# Patient Record
Sex: FEMALE | Race: WHITE | NOT HISPANIC OR LATINO | Employment: FULL TIME | URBAN - METROPOLITAN AREA
[De-identification: names, ages, dates, MRNs, and addresses within clinical notes are randomized per-mention and may not be internally consistent; named-entity substitution may affect disease eponyms.]

---

## 2017-12-14 ENCOUNTER — GENERIC CONVERSION - ENCOUNTER (OUTPATIENT)
Dept: OTHER | Facility: OTHER | Age: 37
End: 2017-12-14

## 2018-01-24 VITALS
SYSTOLIC BLOOD PRESSURE: 108 MMHG | RESPIRATION RATE: 18 BRPM | HEIGHT: 66 IN | HEART RATE: 88 BPM | DIASTOLIC BLOOD PRESSURE: 70 MMHG | OXYGEN SATURATION: 97 % | TEMPERATURE: 97.5 F

## 2018-01-24 VITALS — BODY MASS INDEX: 26.87 KG/M2 | WEIGHT: 166.5 LBS

## 2018-04-09 ENCOUNTER — OFFICE VISIT (OUTPATIENT)
Dept: URGENT CARE | Facility: CLINIC | Age: 38
End: 2018-04-09
Payer: COMMERCIAL

## 2018-04-09 VITALS
HEIGHT: 67 IN | TEMPERATURE: 97.8 F | WEIGHT: 162.2 LBS | OXYGEN SATURATION: 98 % | SYSTOLIC BLOOD PRESSURE: 124 MMHG | HEART RATE: 84 BPM | RESPIRATION RATE: 18 BRPM | DIASTOLIC BLOOD PRESSURE: 68 MMHG | BODY MASS INDEX: 25.46 KG/M2

## 2018-04-09 DIAGNOSIS — J04.0 ACUTE LARYNGITIS: Primary | ICD-10-CM

## 2018-04-09 LAB — S PYO AG THROAT QL: NEGATIVE

## 2018-04-09 PROCEDURE — 87070 CULTURE OTHR SPECIMN AEROBIC: CPT | Performed by: FAMILY MEDICINE

## 2018-04-09 PROCEDURE — 99213 OFFICE O/P EST LOW 20 MIN: CPT | Performed by: FAMILY MEDICINE

## 2018-04-09 PROCEDURE — 87880 STREP A ASSAY W/OPTIC: CPT | Performed by: FAMILY MEDICINE

## 2018-04-09 RX ORDER — DIPHENOXYLATE HYDROCHLORIDE AND ATROPINE SULFATE 2.5; .025 MG/1; MG/1
TABLET ORAL
COMMUNITY
End: 2022-03-10 | Stop reason: HOSPADM

## 2018-04-09 RX ORDER — PREDNISONE 50 MG/1
50 TABLET ORAL DAILY
Qty: 5 TABLET | Refills: 0 | Status: SHIPPED | OUTPATIENT
Start: 2018-04-09 | End: 2018-04-14

## 2018-04-09 RX ORDER — NORETHINDRONE ACETATE/ETHINYL ESTRADIOL AND FERROUS FUMARATE 1.5-30(21)
KIT ORAL
Refills: 0 | COMMUNITY
Start: 2018-04-02

## 2018-04-09 NOTE — PROGRESS NOTES
[facility]        NAME: Manju Centeno is a 30 y o  female  : 1980    MRN: [id number]  DATE: 2018  TIME: 10:03 AM    Assessment and Plan   Acute laryngitis [J04 0]  1  Acute laryngitis           Patient Instructions       Follow up with PCP in 3-5 days  Proceed to  ER if symptoms worsen  Chief Complaint     Chief Complaint   Patient presents with    Cold Like Symptoms     Pt here ill x5 day pt states loss of voice,  sore throat,  head congestion,  feverish,  cough  Pt used Dayquil and Advil cold and Sinus  History of Present Illness       URI    The current episode started in the past 7 days  The problem has been gradually worsening  There has been no fever  Associated symptoms include coughing, rhinorrhea and a sore throat  Pertinent negatives include no plugged ear sensation or sinus pain  She has tried decongestant for the symptoms  The treatment provided mild relief  Review of Systems   Review of Systems   Constitutional: Positive for chills  Negative for fever  HENT: Positive for rhinorrhea and sore throat  Negative for sinus pain  Eyes: Negative  Respiratory: Positive for cough  Cardiovascular: Negative  Gastrointestinal: Negative  Musculoskeletal: Negative  Current Medications       Current Outpatient Prescriptions:     VAHE FE 1  1 5-30 MG-MCG tablet, , Disp: , Rfl: 0    multivitamin (THERAGRAN) TABS, Take by mouth, Disp: , Rfl:     Current Allergies     Allergies as of 2018    (No Known Allergies)            The following portions of the patient's history were reviewed and updated as appropriate: allergies, current medications, past family history, past medical history, past social history, past surgical history and problem list      History reviewed  No pertinent past medical history      Past Surgical History:   Procedure Laterality Date     SECTION         Family History   Problem Relation Age of Onset    Breast cancer Mother     GI problems Father          Medications have been verified  Objective   /68 (BP Location: Right arm, Patient Position: Sitting, Cuff Size: Standard)   Pulse 84   Temp 97 8 °F (36 6 °C) (Tympanic)   Resp 18   Ht 5' 7" (1 702 m)   Wt 73 6 kg (162 lb 3 2 oz)   SpO2 98%   BMI 25 40 kg/m²        Physical Exam     Physical Exam   Constitutional: She is oriented to person, place, and time  She appears well-developed and well-nourished  HENT:   Right Ear: External ear normal    Left Ear: External ear normal    Nose: Nose normal    Mouth/Throat: Oropharynx is clear and moist  No oropharyngeal exudate  Eyes: Conjunctivae are normal    Neck: Normal range of motion  Neck supple  Cardiovascular: Normal rate, regular rhythm and normal heart sounds  No murmur heard  Pulmonary/Chest: Effort normal and breath sounds normal  No respiratory distress  She has no wheezes  She has no rales  She exhibits no tenderness  Abdominal: Soft  She exhibits no distension and no mass  There is no tenderness  There is no rebound and no guarding  Musculoskeletal: Normal range of motion  Lymphadenopathy:     She has no cervical adenopathy  Neurological: She is alert and oriented to person, place, and time  No cranial nerve deficit  Skin: Skin is warm  No rash noted  No erythema

## 2018-04-09 NOTE — PATIENT INSTRUCTIONS
Follow up with PCP in 3-5 days  Proceed to  ER if symptoms worsen  Upper Respiratory Infection   AMBULATORY CARE:   An upper respiratory infection  is also called a common cold  It can affect your nose, throat, ears, and sinuses  Common signs and symptoms include the following:  Cold symptoms are usually worst for the first 3 to 5 days  You may have any of the following:  · Runny or stuffy nose    · Sneezing and coughing    · Sore throat or hoarseness    · Red, watery, and sore eyes    · Fatigue     · Chills and fever    · Headache, body aches, or sore muscles  Seek care immediately if:   · You have chest pain or trouble breathing  Contact your healthcare provider if:   · You have a fever over 102ºF (39°C)  · Your sore throat gets worse or you see white or yellow spots in your throat  · Your symptoms get worse after 3 to 5 days or your cold is not better in 14 days  · You have a rash anywhere on your skin  · You have large, tender lumps in your neck  · You have thick, green or yellow drainage from your nose  · You cough up thick yellow, green, or bloody mucus  · You have vomiting for more than 24 hours and cannot keep fluids down  · You have a bad earache  · You have questions or concerns about your condition or care  Treatment for a cold: There is no cure for the common cold  Colds are caused by viruses and do not get better with antibiotics  Most people get better in 7 to 14 days  You may continue to cough for 2 to 3 weeks  The following may help decrease your symptoms:  · Decongestants  help reduce nasal congestion and help you breathe more easily  If you take decongestant pills, they may make you feel restless or not able to sleep  Do not use decongestant sprays for more than a few days  · Cough suppressants  help reduce coughing  Ask your healthcare provider which type of cough medicine is best for you       · NSAIDs , such as ibuprofen, help decrease swelling, pain, and fever  NSAIDs can cause stomach bleeding or kidney problems in certain people  If you take blood thinner medicine, always ask your healthcare provider if NSAIDs are safe for you  Always read the medicine label and follow directions  · Acetaminophen  decreases pain and fever  It is available without a doctor's order  Ask how much to take and how often to take it  Follow directions  Read the labels of all other medicines you are using to see if they also contain acetaminophen, or ask your doctor or pharmacist  Acetaminophen can cause liver damage if not taken correctly  Do not use more than 4 grams (4,000 milligrams) total of acetaminophen in one day  Manage your cold:   · Rest as much as possible  Slowly start to do more each day  · Drink more liquids as directed  Liquids will help thin and loosen mucus so you can cough it up  Liquids will also help prevent dehydration  Liquids that help prevent dehydration include water, fruit juice, and broth  Do not drink liquids that contain caffeine  Caffeine can increase your risk for dehydration  Ask your healthcare provider how much liquid to drink each day  · Soothe a sore throat  Gargle with warm salt water  This helps your sore throat feel better  Make salt water by dissolving ¼ teaspoon salt in 1 cup warm water  You may also suck on hard candy or throat lozenges  You may use a sore throat spray  · Use a humidifier or vaporizer  Use a cool mist humidifier or a vaporizer to increase air moisture in your home  This may make it easier for you to breathe and help decrease your cough  · Use saline nasal drops as directed  These help relieve congestion  · Apply petroleum-based jelly around the outside of your nostrils  This can decrease irritation from blowing your nose  · Do not smoke  Nicotine and other chemicals in cigarettes and cigars can make your symptoms worse  They can also cause infections such as bronchitis or pneumonia   Ask your healthcare provider for information if you currently smoke and need help to quit  E-cigarettes or smokeless tobacco still contain nicotine  Talk to your healthcare provider before you use these products  Prevent spreading your cold to others:   · Try to stay away from other people during the first 2 to 3 days of your cold when it is more easily spread  · Do not share food or drinks  · Do not share hand towels with household members  · Wash your hands often, especially after you blow your nose  Turn away from other people and cover your mouth and nose with a tissue when you sneeze or cough  Follow up with your healthcare provider as directed:  Write down your questions so you remember to ask them during your visits  © 2017 [facility] Information is for End User's use only and may not be sold, redistributed or otherwise used for commercial purposes  All illustrations and images included in CareNotes® are the copyrighted property of A D A M , Inc  or Carlos Olvera  The above information is an  only  It is not intended as medical advice for individual conditions or treatments  Talk to your doctor, nurse or pharmacist before following any medical regimen to see if it is safe and effective for you

## 2018-04-11 LAB — BACTERIA THROAT CULT: NORMAL

## 2018-10-01 ENCOUNTER — OFFICE VISIT (OUTPATIENT)
Dept: URGENT CARE | Facility: CLINIC | Age: 38
End: 2018-10-01
Payer: COMMERCIAL

## 2018-10-01 VITALS
RESPIRATION RATE: 16 BRPM | WEIGHT: 156 LBS | TEMPERATURE: 97.6 F | DIASTOLIC BLOOD PRESSURE: 64 MMHG | SYSTOLIC BLOOD PRESSURE: 102 MMHG | HEIGHT: 66 IN | HEART RATE: 73 BPM | OXYGEN SATURATION: 100 % | BODY MASS INDEX: 25.07 KG/M2

## 2018-10-01 DIAGNOSIS — B34.9 VIRAL SYNDROME: ICD-10-CM

## 2018-10-01 DIAGNOSIS — J02.9 PHARYNGITIS, UNSPECIFIED ETIOLOGY: Primary | ICD-10-CM

## 2018-10-01 LAB — S PYO AG THROAT QL: NEGATIVE

## 2018-10-01 PROCEDURE — 87070 CULTURE OTHR SPECIMN AEROBIC: CPT | Performed by: PHYSICIAN ASSISTANT

## 2018-10-01 PROCEDURE — 99213 OFFICE O/P EST LOW 20 MIN: CPT | Performed by: PHYSICIAN ASSISTANT

## 2018-10-01 PROCEDURE — 87880 STREP A ASSAY W/OPTIC: CPT | Performed by: PHYSICIAN ASSISTANT

## 2018-10-01 NOTE — PATIENT INSTRUCTIONS
Call in 48 hours for the result of your throat culture  If positive, we will begin an appropriate antibiotic as this time  Take the antibiotic as directed with food and water  While on this medication begin a probiotic such as yogurt  Change your toothbrush after 3-4 days on the antibiotic  Avoid sharing of beverages, utensils, and food with others  Follow conservative measures as reviewed  If negative, continue the following measures and seek follow up with your family doctor  Perform a saltwater gargle, drink warm tea with honey, and use throat lozenges for throat relief  Motrin or Tylenol may be used for fever and discomfort  If your develop congestion you may take a decongestant such as Mucinex  Follow up with your family doctor in 3-5 days  Proceed to the ER if symptoms worsen

## 2018-10-01 NOTE — PROGRESS NOTES
[facility]        NAME: Veronica Everett is a 41 y o  female  : 1980    MRN: [id number]  DATE: 2018  TIME: 10:39 AM    Assessment and Plan   Pharyngitis, unspecified etiology [J02 9]  1  Pharyngitis, unspecified etiology  POCT rapid strepA    Throat culture   2  Viral syndrome       Patient Instructions   Call in 48 hours for the result of your throat culture  If positive, we will begin an appropriate antibiotic as this time  Take the antibiotic as directed with food and water  While on this medication begin a probiotic such as yogurt  Change your toothbrush after 3-4 days on the antibiotic  Avoid sharing of beverages, utensils, and food with others  Follow conservative measures as reviewed  If negative, continue the following measures and seek follow up with your family doctor  Perform a saltwater gargle, drink warm tea with honey, and use throat lozenges for throat relief  Motrin or Tylenol may be used for fever and discomfort  If your develop congestion you may take a decongestant such as Mucinex  Follow up with your family doctor in 3-5 days  Proceed to the ER if symptoms worsen  Chief Complaint     Chief Complaint   Patient presents with    Sore Throat     Pt here ill  x4 day, feeling run down, feverish,  sore throat,  swollen glands  Pt used  Tylenol  History of Present Illness    68-year-old female presenting with complaint of fatigue and feelings of being run down x3 days  Patient notes symptoms of fevers, chills, muscle aches, joint aches, and fatigue at onset that progressed the following day to sore throat  Symptoms were progressing at a steady state until yesterday when sore throat significantly worsened  She looked at throat this morning and noticed extreme redness and white patches on the tonsils  She is now concerned for strep throat  Symptoms associated with occasional ear popping, intermittent pressure headache, and diarrhea   She has been treating with throat lozenges, Motrin, and Tylenol with minimal relief  T-max 101  She does note she has 3 children at home 1 of which is running a low-grade fever of 99 5  She denies any other sick contacts or recent travel  Review of Systems   Review of Systems   Constitutional: Positive for appetite change, chills, fatigue and fever  Negative for diaphoresis  HENT: Negative for congestion, ear pain, postnasal drip, rhinorrhea, sinus pressure, trouble swallowing and voice change  Respiratory: Negative for shortness of breath and wheezing  Cardiovascular: Negative for chest pain and palpitations  Gastrointestinal: Positive for diarrhea  Negative for abdominal pain, nausea and vomiting  Musculoskeletal: Positive for arthralgias and myalgias  Negative for gait problem and joint swelling  Skin: Negative for rash  Neurological: Negative for dizziness, weakness and light-headedness  Current Medications       Current Outpatient Prescriptions:     VAHE FE 1  1 5-30 MG-MCG tablet, , Disp: , Rfl: 0    multivitamin (THERAGRAN) TABS, Take by mouth, Disp: , Rfl:     Current Allergies     Allergies as of 10/01/2018    (No Known Allergies)            The following portions of the patient's history were reviewed and updated as appropriate: allergies, current medications, past family history, past medical history, past social history, past surgical history and problem list      History reviewed  No pertinent past medical history  Past Surgical History:   Procedure Laterality Date     SECTION         Family History   Problem Relation Age of Onset   Lucero Leisure Breast cancer Mother     GI problems Father        Medications have been verified      Objective   /64 (BP Location: Right arm, Patient Position: Sitting, Cuff Size: Standard)   Pulse 73   Temp 97 6 °F (36 4 °C) (Tympanic)   Resp 16   Ht 5' 6" (1 676 m)   Wt 70 8 kg (156 lb)   SpO2 100%   BMI 25 18 kg/m²      POCT Rapid strep A: Negative  Physical Exam     Physical Exam   Constitutional: She is oriented to person, place, and time  Vital signs are normal  She appears well-developed and well-nourished  She is cooperative  She appears ill  No distress  HENT:   Head: Normocephalic and atraumatic  Right Ear: Tympanic membrane, external ear and ear canal normal  No middle ear effusion  Left Ear: Tympanic membrane, external ear and ear canal normal   No middle ear effusion  Nose: Mucosal edema and rhinorrhea (clear, mucoid) present  Mouth/Throat: Uvula is midline and mucous membranes are normal  Mucous membranes are not pale, not dry and not cyanotic  No oral lesions  Oropharyngeal exudate (streaking white exudate of the left tonsil) and posterior oropharyngeal erythema present  No posterior oropharyngeal edema  Eyes: Conjunctivae are normal    Neck: Phonation normal  Neck supple  No spinous process tenderness and no muscular tenderness present  No neck rigidity  No edema and no erythema present  Cardiovascular: Normal rate, regular rhythm and normal heart sounds  Pulmonary/Chest: Effort normal and breath sounds normal  No stridor  No respiratory distress  She has no decreased breath sounds  She has no wheezes  She has no rhonchi  She has no rales  Lymphadenopathy:     She has cervical adenopathy (Tender anterior cervical nodes  Tender submandibular glands)  Neurological: She is alert and oriented to person, place, and time  No cranial nerve deficit  She exhibits normal muscle tone  Coordination normal    Skin: Skin is warm and dry  No rash noted  She is not diaphoretic  Psychiatric: She has a normal mood and affect  Her behavior is normal    Nursing note and vitals reviewed

## 2018-10-02 ENCOUNTER — TELEPHONE (OUTPATIENT)
Dept: URGENT CARE | Facility: CLINIC | Age: 38
End: 2018-10-02

## 2018-10-02 NOTE — TELEPHONE ENCOUNTER
Reviewed negative throat culture the patient  She states she did notice increasing white patches on the back of her throat but otherwise is feeling much better today  Advised follow up with her family doctor if symptoms persist/not getting better  All questions answered  Precautions given

## 2018-10-03 LAB — BACTERIA THROAT CULT: NORMAL

## 2019-05-25 ENCOUNTER — OFFICE VISIT (OUTPATIENT)
Dept: URGENT CARE | Facility: CLINIC | Age: 39
End: 2019-05-25
Payer: COMMERCIAL

## 2019-05-25 VITALS
HEART RATE: 97 BPM | HEIGHT: 66 IN | OXYGEN SATURATION: 100 % | SYSTOLIC BLOOD PRESSURE: 110 MMHG | TEMPERATURE: 99.9 F | DIASTOLIC BLOOD PRESSURE: 62 MMHG | RESPIRATION RATE: 16 BRPM | BODY MASS INDEX: 25.84 KG/M2 | WEIGHT: 160.8 LBS

## 2019-05-25 DIAGNOSIS — J02.0 STREP PHARYNGITIS: Primary | ICD-10-CM

## 2019-05-25 DIAGNOSIS — J02.9 SORE THROAT: ICD-10-CM

## 2019-05-25 LAB — S PYO AG THROAT QL: POSITIVE

## 2019-05-25 PROCEDURE — 99214 OFFICE O/P EST MOD 30 MIN: CPT | Performed by: PHYSICIAN ASSISTANT

## 2019-05-25 PROCEDURE — 87880 STREP A ASSAY W/OPTIC: CPT | Performed by: PHYSICIAN ASSISTANT

## 2019-05-25 RX ORDER — AMOXICILLIN 875 MG/1
875 TABLET, COATED ORAL 2 TIMES DAILY
Qty: 14 TABLET | Refills: 0 | Status: SHIPPED | COMMUNITY
Start: 2019-05-25 | End: 2019-06-01

## 2019-07-29 ENCOUNTER — OFFICE VISIT (OUTPATIENT)
Dept: URGENT CARE | Facility: CLINIC | Age: 39
End: 2019-07-29
Payer: COMMERCIAL

## 2019-07-29 VITALS
OXYGEN SATURATION: 100 % | BODY MASS INDEX: 25.46 KG/M2 | TEMPERATURE: 98.7 F | WEIGHT: 158.4 LBS | RESPIRATION RATE: 16 BRPM | SYSTOLIC BLOOD PRESSURE: 104 MMHG | HEIGHT: 66 IN | DIASTOLIC BLOOD PRESSURE: 64 MMHG | HEART RATE: 84 BPM

## 2019-07-29 DIAGNOSIS — J02.0 STREP PHARYNGITIS: Primary | ICD-10-CM

## 2019-07-29 DIAGNOSIS — J02.9 SORE THROAT: ICD-10-CM

## 2019-07-29 LAB — S PYO AG THROAT QL: POSITIVE

## 2019-07-29 PROCEDURE — 99213 OFFICE O/P EST LOW 20 MIN: CPT | Performed by: PHYSICIAN ASSISTANT

## 2019-07-29 PROCEDURE — 87880 STREP A ASSAY W/OPTIC: CPT | Performed by: PHYSICIAN ASSISTANT

## 2019-07-29 RX ORDER — AMOXICILLIN 875 MG/1
875 TABLET, COATED ORAL 2 TIMES DAILY
Qty: 14 TABLET | Refills: 0 | Status: SHIPPED | COMMUNITY
Start: 2019-07-29 | End: 2019-08-05

## 2019-07-29 NOTE — PROGRESS NOTES
[facility]        NAME: Regan Ibrahim is a 42 y o  female  : 1980    MRN: [id number]  DATE: 2019  TIME: 9:13 AM    Assessment and Plan   Strep pharyngitis [J02 0]  1  Strep pharyngitis  amoxicillin (AMOXIL) 875 mg tablet   2  Sore throat  POCT rapid strepA         Patient Instructions   Acute Pharyngitis:   -The patient has bilateral tonsillar erythema, edema and exudates with bilateral cervical adenopathy    -Rapid strep was positive  Will treat with Amoxicillin 875mg taken as prescribed  Take with food and a probiotic    -Warm salt water gargles and tea with honey may provide relief  Stay well hydrated and rest    -You can take Cepacol throat drops or spray for local pain relief of the throat  You can take Advil or Tylenol for fever or pain    -If your symptoms worsen or change follow up with your PCP for a recheck  We discussed that as this is her second case this year that if she has another episode she should consider seeing an ENT  Follow up with PCP in 3-5 days  Proceed to  ER if symptoms worsen  Chief Complaint     Chief Complaint   Patient presents with    Sore Throat     Pt here ill x 1 days pt states  sore throat, fever 102 6 last night,  bodyaches,   right shoulder pain  Pt used tylenol at 7:00 am           History of Present Illness       Regan Ibrahim is a 51-year-old female who presents today for a one day hx of pharyngitis, fever and myalgias  The patient states that her Tmax yesterday was 102 6 degrees  She states that she took tylenol for the fever at approximately 7:00am this morning  The patient states that this morning she noticed white exudates on her tonsils bilaterally  She states that she has painful swallowing  She denies neck stiffness or drooling  She denies sick contacts or recent travel  Review of Systems   Review of Systems   Constitutional: Positive for fatigue and fever   Negative for activity change, appetite change, chills and diaphoresis  HENT: Positive for sore throat and trouble swallowing  Negative for congestion, ear discharge, ear pain, facial swelling, hearing loss, postnasal drip, rhinorrhea, sinus pressure, sinus pain, sneezing, tinnitus and voice change  Respiratory: Negative for cough, chest tightness, shortness of breath, wheezing and stridor  Cardiovascular: Negative for chest pain, palpitations and leg swelling  Gastrointestinal: Negative for abdominal pain, diarrhea, nausea and vomiting  Musculoskeletal: Positive for myalgias  Negative for arthralgias, joint swelling, neck pain and neck stiffness  Skin: Negative for rash  Allergic/Immunologic: Negative for immunocompromised state  Neurological: Negative for dizziness, weakness, light-headedness, numbness and headaches  Hematological: Negative for adenopathy  Current Medications       Current Outpatient Medications:     VAHE FE 1  1 5-30 MG-MCG tablet, , Disp: , Rfl: 0    multivitamin (THERAGRAN) TABS, Take by mouth, Disp: , Rfl:     amoxicillin (AMOXIL) 875 mg tablet, Take 1 tablet (875 mg total) by mouth 2 (two) times a day for 7 days, Disp: 14 tablet, Rfl: 0    Current Allergies     Allergies as of 2019    (No Known Allergies)            The following portions of the patient's history were reviewed and updated as appropriate: allergies, current medications, past family history, past medical history, past social history, past surgical history and problem list      Past Medical History:   Diagnosis Date    Patient denies medical problems        Past Surgical History:   Procedure Laterality Date     SECTION         Family History   Problem Relation Age of Onset    Breast cancer Mother     GI problems Father          Medications have been verified          Objective   /64 (BP Location: Right arm, Patient Position: Sitting, Cuff Size: Standard)   Pulse 84   Temp 98 7 °F (37 1 °C) (Tympanic)   Resp 16   Ht 5' 6" (1 676 m)   Wt 71 8 kg (158 lb 6 4 oz)   SpO2 100%   BMI 25 57 kg/m²        Physical Exam     Physical Exam   Constitutional: She is oriented to person, place, and time  She appears well-developed and well-nourished  No distress  HENT:   Head: Normocephalic and atraumatic  Right Ear: Hearing, tympanic membrane, external ear and ear canal normal    Left Ear: Hearing, tympanic membrane, external ear and ear canal normal    Nose: Nose normal  No mucosal edema or rhinorrhea  Right sinus exhibits no maxillary sinus tenderness and no frontal sinus tenderness  Left sinus exhibits no maxillary sinus tenderness and no frontal sinus tenderness  Mouth/Throat: Uvula is midline and mucous membranes are normal  No uvula swelling  Posterior oropharyngeal edema and posterior oropharyngeal erythema present  No oropharyngeal exudate or tonsillar abscesses  Tonsils are 2+ on the right  Tonsils are 2+ on the left  Tonsillar exudate (bilateral tonsillar exudate )  Neck: Normal range of motion  Neck supple  Cardiovascular: Normal rate, regular rhythm, S1 normal and S2 normal  Exam reveals no gallop, no S3, no S4, no distant heart sounds and no friction rub  No murmur heard  Pulmonary/Chest: No accessory muscle usage  No tachypnea and no bradypnea  No respiratory distress  She has no decreased breath sounds  She has no wheezes  She has no rhonchi  She has no rales  Lymphadenopathy:     She has cervical adenopathy (bilateral anterior cervical adenopathy )  Neurological: She is alert and oriented to person, place, and time  Skin: She is not diaphoretic  Psychiatric: She has a normal mood and affect  Her behavior is normal    Nursing note and vitals reviewed

## 2019-07-29 NOTE — PATIENT INSTRUCTIONS
Strep Throat   WHAT YOU NEED TO KNOW:   Strep throat is a throat infection caused by bacteria  It is easily spread from person to person  DISCHARGE INSTRUCTIONS:   Call 911 for any of the following:   · You have trouble breathing  Return to the emergency department if:   · You have new symptoms like a bad headache, stiff neck, chest pain, or vomiting  · You are drooling because you cannot swallow your spit  Contact your healthcare provider if:   · You have a fever  · You have a rash or ear pain  · You have green, yellow-brown, or bloody mucus when you cough or blow your nose  · You are unable to drink anything  · You have questions or concerns about your condition or care  Medicines:   · Antibiotics  help treat your strep throat  You should feel better within 2 to 3 days after you start antibiotics  · Take your medicine as directed  Contact your healthcare provider if you think your medicine is not helping or if you have side effects  Tell him or her if you are allergic to any medicine  Keep a list of the medicines, vitamins, and herbs you take  Include the amounts, and when and why you take them  Bring the list or the pill bottles to follow-up visits  Carry your medicine list with you in case of an emergency  Manage your symptoms:   · Use lozenges, ice, soft foods, or popsicles  to soothe your throat  · Drink juice, milk shakes, or soup  if your throat is too sore to eat solid food  Drinking liquids can also help prevent dehydration  · Gargle with salt water  Mix ¼ teaspoon salt in a glass of warm water and gargle  This may help reduce swelling in your throat  · Do not smoke  Nicotine and other chemicals in cigarettes and cigars can cause lung damage and make your symptoms worse  Ask your healthcare provider for information if you currently smoke and need help to quit  E-cigarettes or smokeless tobacco still contain nicotine   Talk to your healthcare provider before you use these products  Return to work or school  24 hours after you start antibiotic medicine  Prevent the spread of strep throat:   · Wash your hands often  Use soap and water  Wash your hands after you use the bathroom, change a child's diapers, or sneeze  Wash your hands before you prepare or eat food  · Do not share food or drinks  Replace your toothbrush after you have taken antibiotics for 24 hours  Follow up with your healthcare provider as directed:  Write down your questions so you remember to ask them during your visits  © 2017 [facility] [facility] Information is for End User's use only and may not be sold, redistributed or otherwise used for commercial purposes  All illustrations and images included in CareNotes® are the copyrighted property of [employer] D [employer] M [employer] Inc  or Carlos Olvera  The above information is an  only  It is not intended as medical advice for individual conditions or treatments  Talk to your doctor, nurse or pharmacist before following any medical regimen to see if it is safe and effective for you  Acute Pharyngitis:   -The patient has bilateral tonsillar erythema, edema and exudates with bilateral cervical adenopathy    -Rapid strep was positive  Will treat with Amoxicillin 875mg taken as prescribed  Take with food and a probiotic    -Warm salt water gargles and tea with honey may provide relief  Stay well hydrated and rest    -You can take Cepacol throat drops or spray for local pain relief of the throat  You can take Advil or Tylenol for fever or pain    -If your symptoms worsen or change follow up with your PCP for a recheck  We discussed that as this is her second case this year that if she has another episode she should consider seeing an ENT

## 2019-11-13 ENCOUNTER — OFFICE VISIT (OUTPATIENT)
Dept: URGENT CARE | Facility: CLINIC | Age: 39
End: 2019-11-13
Payer: COMMERCIAL

## 2019-11-13 VITALS
SYSTOLIC BLOOD PRESSURE: 100 MMHG | HEART RATE: 83 BPM | RESPIRATION RATE: 16 BRPM | OXYGEN SATURATION: 100 % | TEMPERATURE: 97.8 F | DIASTOLIC BLOOD PRESSURE: 64 MMHG | WEIGHT: 159 LBS | BODY MASS INDEX: 25.66 KG/M2

## 2019-11-13 DIAGNOSIS — J06.9 ACUTE URI: Primary | ICD-10-CM

## 2019-11-13 PROCEDURE — 99213 OFFICE O/P EST LOW 20 MIN: CPT | Performed by: FAMILY MEDICINE

## 2019-11-13 RX ORDER — FLUTICASONE PROPIONATE 50 MCG
1 SPRAY, SUSPENSION (ML) NASAL DAILY
Qty: 1 BOTTLE | Refills: 0 | Status: SHIPPED | OUTPATIENT
Start: 2019-11-13 | End: 2020-03-09 | Stop reason: ALTCHOICE

## 2019-11-13 RX ORDER — BENZONATATE 200 MG/1
200 CAPSULE ORAL 3 TIMES DAILY PRN
Qty: 20 CAPSULE | Refills: 0 | Status: SHIPPED | OUTPATIENT
Start: 2019-11-13 | End: 2020-03-09 | Stop reason: ALTCHOICE

## 2019-11-13 NOTE — PROGRESS NOTES
[email or web address]        NAME: Gerhard Castellanos is a 42 y o  female  : 1980    MRN: [id number]  DATE: 2019  TIME: 12:51 PM    Assessment and Plan   Acute URI [J06 9]  1  Acute URI  benzonatate (TESSALON) 200 MG capsule    fluticasone (FLONASE) 50 mcg/act nasal spray         Patient Instructions     Patient Instructions   1  Acute viral URI (common cold)  - rest and drink plenty of fluids   - take Tylenol or Motrin as needed   - run a humidifier at home  - try warm salt water gargles and throat lozenges as needed  - start Flonase nasal spray, use as directed   - Tessalon pearls prescribed to help w/ cough   - if symptoms persist despite treatment or worsen, follow up w/ pcp for re-check       Follow up with PCP in 5-7 days  Proceed to  ER if symptoms worsen  Chief Complaint     Chief Complaint   Patient presents with    Cold Like Symptoms     x 1 day         History of Present Illness       43 yo female presents c/o sinus pressure, nasal congestion, hoarse voice, mild sore throat, and dry cough x 1 day  No fever/chills  No headache or body aches  No chest pain, SOB, or wheezing  Non-smoker  No GI sx  No skin rashes  She has been using a homeopathic anti-flu medicine   was ill with similar symptoms last week  Review of Systems   Review of Systems   Constitutional: Negative  HENT:        As noted in HPI   Eyes: Negative  Respiratory:        As noted in HPI   Cardiovascular: Negative  Gastrointestinal: Negative  Musculoskeletal: Negative  Skin: Negative  Neurological: Negative            Current Medications       Current Outpatient Medications:     VAHE FE  1 5-30 MG-MCG tablet, , Disp: , Rfl: 0    multivitamin (THERAGRAN) TABS, Take by mouth, Disp: , Rfl:     benzonatate (TESSALON) 200 MG capsule, Take 1 capsule (200 mg total) by mouth 3 (three) times a day as needed for cough, Disp: 20 capsule, Rfl: 0    fluticasone (FLONASE) 50 mcg/act nasal spray, 1 spray into each nostril daily, Disp: 1 Bottle, Rfl: 0    Current Allergies     Allergies as of 2019    (No Known Allergies)            The following portions of the patient's history were reviewed and updated as appropriate: allergies, current medications, past family history, past medical history, past social history, past surgical history and problem list      Past Medical History:   Diagnosis Date    Patient denies medical problems        Past Surgical History:   Procedure Laterality Date     SECTION         Family History   Problem Relation Age of Onset   Holder Breast cancer Mother     GI problems Father          Medications have been verified  Objective   /64   Pulse 83   Temp 97 8 °F (36 6 °C)   Resp 16   Wt 72 1 kg (159 lb)   LMP 2019   SpO2 100%   BMI 25 66 kg/m²        Physical Exam     Physical Exam   Constitutional: She is oriented to person, place, and time  Vital signs are normal  She appears well-developed and well-nourished  She is active and cooperative  Non-toxic appearance  She does not have a sickly appearance  She does not appear ill  No distress  HENT:   Head: Normocephalic and atraumatic  Right Ear: Tympanic membrane, external ear and ear canal normal    Left Ear: Tympanic membrane, external ear and ear canal normal    Nose: Nose normal  Right sinus exhibits no maxillary sinus tenderness and no frontal sinus tenderness  Left sinus exhibits no maxillary sinus tenderness and no frontal sinus tenderness  Mouth/Throat: Uvula is midline, oropharynx is clear and moist and mucous membranes are normal    Eyes: Conjunctivae and EOM are normal    Neck: Normal range of motion  Neck supple  Cardiovascular: Normal rate, regular rhythm and normal heart sounds  Pulmonary/Chest: Effort normal and breath sounds normal  No respiratory distress  Lymphadenopathy:     She has no cervical adenopathy     Neurological: She is alert and oriented to person, place, and time  Skin: Skin is warm and dry  No rash noted  She is not diaphoretic  Psychiatric: She has a normal mood and affect  Her behavior is normal  Judgment and thought content normal    Nursing note and vitals reviewed

## 2019-11-13 NOTE — PATIENT INSTRUCTIONS
1  Acute viral URI (common cold)  - rest and drink plenty of fluids   - take Tylenol or Motrin as needed   - run a humidifier at home  - try warm salt water gargles and throat lozenges as needed  - start Flonase nasal spray, use as directed   - Tessalon pearls prescribed to help w/ cough   - if symptoms persist despite treatment or worsen, follow up w/ pcp for re-check

## 2019-11-18 ENCOUNTER — APPOINTMENT (OUTPATIENT)
Dept: RADIOLOGY | Facility: CLINIC | Age: 39
End: 2019-11-18
Payer: COMMERCIAL

## 2019-11-18 ENCOUNTER — OFFICE VISIT (OUTPATIENT)
Dept: URGENT CARE | Facility: CLINIC | Age: 39
End: 2019-11-18
Payer: COMMERCIAL

## 2019-11-18 VITALS
TEMPERATURE: 97.8 F | SYSTOLIC BLOOD PRESSURE: 108 MMHG | BODY MASS INDEX: 25.66 KG/M2 | RESPIRATION RATE: 18 BRPM | DIASTOLIC BLOOD PRESSURE: 60 MMHG | HEIGHT: 66 IN | HEART RATE: 80 BPM | OXYGEN SATURATION: 99 %

## 2019-11-18 DIAGNOSIS — J18.9 COMMUNITY ACQUIRED PNEUMONIA, UNSPECIFIED LATERALITY: Primary | ICD-10-CM

## 2019-11-18 DIAGNOSIS — R05.9 COUGH: ICD-10-CM

## 2019-11-18 PROCEDURE — 99214 OFFICE O/P EST MOD 30 MIN: CPT | Performed by: PHYSICIAN ASSISTANT

## 2019-11-18 PROCEDURE — 71046 X-RAY EXAM CHEST 2 VIEWS: CPT

## 2019-11-18 RX ORDER — AZITHROMYCIN 250 MG/1
TABLET, FILM COATED ORAL
Qty: 6 TABLET | Refills: 0 | Status: SHIPPED | OUTPATIENT
Start: 2019-11-18 | End: 2019-11-23

## 2019-11-18 RX ORDER — ALBUTEROL SULFATE 90 UG/1
2 AEROSOL, METERED RESPIRATORY (INHALATION) EVERY 6 HOURS PRN
Qty: 1 INHALER | Refills: 0 | Status: SHIPPED | OUTPATIENT
Start: 2019-11-18 | End: 2020-03-09 | Stop reason: ALTCHOICE

## 2019-11-18 NOTE — PROGRESS NOTES
[facility]        NAME: Monica Vargas is a 42 y o  female  : 1980    MRN: [id number]  DATE: 2019  TIME: 10:33 AM    Assessment and Plan   Community acquired pneumonia, unspecified laterality [J18 9]  1  Community acquired pneumonia, unspecified laterality  azithromycin (ZITHROMAX) 250 mg tablet    albuterol (PROVENTIL HFA,VENTOLIN HFA) 90 mcg/act inhaler   2  Cough  XR chest pa & lateral         Patient Instructions   CAP:   -There is possible consolidations seen of the left lower lobe and right middle lobe on Xray  Awaiting official read  There is rhonchi, rales and wheezing heard on exam    -Will prescribe Azithromycin 250mg to be taken as prescribed  Take with food and a probiotic    -Will prescribe albuterol inhaler for the wheezing and shortness of breath to be taken as prescribed  -Continue the tessalon perles for the cough  -Use a humidifier next to your bed  Take steam showers  -You can take Advil or Tylenol for fever or pain  -Stay very well hydrated and rest   -Follow up with your PCP in the next 2 days for a recheck  If your sx worsen go to the ED  Follow up with PCP in 3-5 days  Proceed to  ER if symptoms worsen  Chief Complaint     Chief Complaint   Patient presents with    Cold Like Symptoms     Pt here  ill  x1 week pt states she is no better,  pt was seen last week   Today  pt states   wet deep cough,  bodyaches no fever  History of Present Illness       The patient presents today for cough, myalgias and fatigue  The patient states that her sx began one week ago with sinus pressure and nasal congestion with pharyngitis and a dry cough  The patient states that she was seen in our office on 2019 and was diagnosed with a URI and was given tessalon perles and Flonase  She states that she has been taking the tessalon perles but feels it is not aiding with relief of her cough   She states that the cough is now productive of a green sputum and is "deep"  She states that she has dyspnea, wheezing and diaphoresis  She states that her  may have had the flu last week  She denies a hx of smoking or asthma  She denies recent travel  She denies fever, chills, cp, palpitations, chest tightness  The patient states that she had her flu vaccination two weeks ago  Review of Systems   Review of Systems   Constitutional: Positive for diaphoresis and fatigue  Negative for activity change, appetite change, chills and fever  HENT: Positive for congestion, postnasal drip and rhinorrhea  Negative for ear discharge, ear pain, facial swelling, hearing loss, sinus pressure, sinus pain, sneezing, sore throat, tinnitus, trouble swallowing and voice change  Respiratory: Positive for cough, shortness of breath and wheezing  Negative for chest tightness and stridor  Cardiovascular: Negative for chest pain, palpitations and leg swelling  Gastrointestinal: Negative for abdominal pain, diarrhea, nausea and vomiting  Musculoskeletal: Positive for myalgias  Negative for arthralgias, joint swelling, neck pain and neck stiffness  Skin: Negative for rash  Allergic/Immunologic: Negative for immunocompromised state  Neurological: Negative for dizziness, weakness, light-headedness, numbness and headaches  Hematological: Negative for adenopathy           Current Medications       Current Outpatient Medications:     benzonatate (TESSALON) 200 MG capsule, Take 1 capsule (200 mg total) by mouth 3 (three) times a day as needed for cough, Disp: 20 capsule, Rfl: 0    VAHE FE 1 5/30 1 5-30 MG-MCG tablet, , Disp: , Rfl: 0    multivitamin (THERAGRAN) TABS, Take by mouth, Disp: , Rfl:     albuterol (PROVENTIL HFA,VENTOLIN HFA) 90 mcg/act inhaler, Inhale 2 puffs every 6 (six) hours as needed for wheezing or shortness of breath, Disp: 1 Inhaler, Rfl: 0    azithromycin (ZITHROMAX) 250 mg tablet, Take 2 tablets today then 1 tablet daily x 4 days, Disp: 6 tablet, Rfl: 0    fluticasone (FLONASE) 50 mcg/act nasal spray, 1 spray into each nostril daily (Patient not taking: Reported on 2019), Disp: 1 Bottle, Rfl: 0    Current Allergies     Allergies as of 2019    (No Known Allergies)            The following portions of the patient's history were reviewed and updated as appropriate: allergies, current medications, past family history, past medical history, past social history, past surgical history and problem list      Past Medical History:   Diagnosis Date    Patient denies medical problems        Past Surgical History:   Procedure Laterality Date     SECTION         Family History   Problem Relation Age of Onset    Breast cancer Mother     GI problems Father          Medications have been verified  Objective   /60 (BP Location: Right arm, Patient Position: Standing, Cuff Size: Standard)   Pulse 80   Temp 97 8 °F (36 6 °C)   Resp 18   Ht 5' 6" (1 676 m)   LMP 2019   SpO2 99%   BMI 25 66 kg/m²        Physical Exam     Physical Exam   Constitutional: She is oriented to person, place, and time  She appears well-developed and well-nourished  Non-toxic appearance  She has a sickly appearance  She does not appear ill  No distress  HENT:   Head: Normocephalic and atraumatic  Right Ear: Hearing, tympanic membrane, external ear and ear canal normal    Left Ear: Hearing, tympanic membrane, external ear and ear canal normal    Nose: Nose normal  No mucosal edema or rhinorrhea  Right sinus exhibits no maxillary sinus tenderness and no frontal sinus tenderness  Left sinus exhibits no maxillary sinus tenderness and no frontal sinus tenderness  Mouth/Throat: Uvula is midline, oropharynx is clear and moist and mucous membranes are normal  No uvula swelling  No oropharyngeal exudate, posterior oropharyngeal edema, posterior oropharyngeal erythema or tonsillar abscesses  Neck: Normal range of motion  Neck supple     Cardiovascular: Normal rate, regular rhythm, S1 normal and S2 normal  Exam reveals no gallop, no S3, no S4, no distant heart sounds and no friction rub  No murmur heard  Pulmonary/Chest: No accessory muscle usage or stridor  No tachypnea and no bradypnea  No respiratory distress  She has no decreased breath sounds  She has wheezes  She has rhonchi  She has rales  The patient has diffuse mild wheezing and diffuse rhonchi and rales  Lymphadenopathy:     She has no cervical adenopathy  Neurological: She is alert and oriented to person, place, and time  Skin: She is not diaphoretic  Psychiatric: She has a normal mood and affect  Her behavior is normal    Nursing note and vitals reviewed

## 2019-11-18 NOTE — PATIENT INSTRUCTIONS
Community Acquired Pneumonia   WHAT YOU NEED TO KNOW:   Community-acquired pneumonia (CAP) is a lung infection that you get outside of a hospital or nursing home setting  Your lungs become inflamed and cannot work well  CAP may be caused by bacteria, viruses, or fungi  DISCHARGE INSTRUCTIONS:   Return to the emergency department if:   · You are confused and cannot think clearly  · You have increased trouble breathing  · Your lips or fingernails turn gray or blue  Contact your healthcare provider if:   · Your symptoms do not get better, or they get worse  · You are urinating less, or not at all  · You have questions or concerns about your condition or care  Medicines:   · Medicines  may be given to treat a bacterial, viral, or fungal infection  You may also be given medicines to dilate your bronchial tubes to help you breathe more easily  · Take your medicine as directed  Contact your healthcare provider if you think your medicine is not helping or if you have side effects  Tell him or her if you are allergic to any medicine  Keep a list of the medicines, vitamins, and herbs you take  Include the amounts, and when and why you take them  Bring the list or the pill bottles to follow-up visits  Carry your medicine list with you in case of an emergency  Follow up with your healthcare provider within 3 days or as directed: You may need another x-ray  Write down your questions so you remember to ask them during your visits  Deep breathing and coughing:  Deep breathing helps open the air passages in your lungs  Coughing helps bring up mucus from your lungs  Take a deep breath and hold the breath as long as you can  Then push the air out of your lungs with a deep, strong cough  Spit out any mucus you have coughed up  Take 10 deep breaths in a row every hour that you are awake  Remember to follow each deep breath with a cough     Do not smoke or allow others to smoke around you:  Nicotine and other chemicals in cigarettes and cigars can cause lung damage  Ask your healthcare provider for information if you currently smoke and need help to quit  E-cigarettes or smokeless tobacco still contain nicotine  Talk to your healthcare provider before you use these products  Manage CAP at home:   · Breathe warm, moist air  This helps loosen mucus  Loosely place a warm, wet washcloth over your nose and mouth  A room humidifier may also help make the air moist     · Drink liquids as directed  Ask your healthcare provider how much liquid to drink each day and which liquids to drink  Liquids help make mucus thin and easier to get out of your body  · Gently tap your chest   This helps loosen mucus so it is easier to cough  Lie with your head lower than your chest several times a day and tap your chest      · Get plenty of rest   Rest helps your body heal   Prevent CAP:   · Wash your hands often with soap and water  Carry germ-killing hand gel with you  You can use the gel to clean your hands when soap and water are not available  Do not touch your eyes, nose, or mouth unless you have washed your hands first      · Clean surfaces often  Clean doorknobs, countertops, cell phones, and other surfaces that are touched often  · Always cover your mouth when you cough  Cough into a tissue or your shirtsleeve so you do not spread germs from your hands  · Try to avoid people who have a cold or the flu  If you are sick, stay away from others as much as possible  · Ask about vaccines  You may need a vaccine to help prevent pneumonia  Get an influenza (flu) vaccine every year as soon as it becomes available  © 2017 [address] [employer] Information is for End User's use only and may not be sold, redistributed or otherwise used for commercial purposes  All illustrations and images included in CareNotes® are the copyrighted property of [employer]  or Carlos Olvera    The above information is an  only  It is not intended as medical advice for individual conditions or treatments  Talk to your doctor, nurse or pharmacist before following any medical regimen to see if it is safe and effective for you  CAP:   -There is possible consolidations seen of the left lower lobe and right middle lobe on Xray  Awaiting official read  There is rhonchi, rales and wheezing heard on exam    -Will prescribe Azithromycin 250mg to be taken as prescribed  Take with food and a probiotic    -Will prescribe albuterol inhaler for the wheezing and shortness of breath to be taken as prescribed  -Continue the tessalon perles for the cough  -Use a humidifier next to your bed  Take steam showers  -You can take Advil or Tylenol for fever or pain  -Stay very well hydrated and rest   -Follow up with your PCP in the next 2 days for a recheck  If your sx worsen go to the ED

## 2020-03-09 ENCOUNTER — OFFICE VISIT (OUTPATIENT)
Dept: URGENT CARE | Facility: CLINIC | Age: 40
End: 2020-03-09
Payer: COMMERCIAL

## 2020-03-09 VITALS
RESPIRATION RATE: 20 BRPM | TEMPERATURE: 98.2 F | WEIGHT: 167.4 LBS | OXYGEN SATURATION: 100 % | HEIGHT: 66 IN | HEART RATE: 60 BPM | BODY MASS INDEX: 26.9 KG/M2 | SYSTOLIC BLOOD PRESSURE: 110 MMHG | DIASTOLIC BLOOD PRESSURE: 68 MMHG

## 2020-03-09 DIAGNOSIS — J06.9 ACUTE URI: Primary | ICD-10-CM

## 2020-03-09 PROCEDURE — 99213 OFFICE O/P EST LOW 20 MIN: CPT | Performed by: PHYSICIAN ASSISTANT

## 2020-03-09 NOTE — PATIENT INSTRUCTIONS
Acute Upper Respiratory Tract Infection:   -There is no sign of bacterial infection on exam at this time  This is likely a viral illness  Advised supportive measures   -Fluticasone Nasal Spray for PND and congestion  -You can use OTC zyrtec or claritin  You can OTC mucinex  -Use a humidifier next to your bed  Take steam showers     -You can take Advil or Tylenol for fever or pain  -Stay very well hydrated and rest   -If your symptoms persist or worsen follow up immediately with your PCP

## 2020-03-09 NOTE — PROGRESS NOTES
[address] [employer]        NAME: Gerhard Castellanos is a 42 y o  female  : 1980    MRN: [id number]  DATE: 2020  TIME: 11:22 AM    Assessment and Plan   Acute URI [J06 9]  1  Acute URI           Patient Instructions   Acute Upper Respiratory Tract Infection:   -There is no sign of bacterial infection on exam at this time  This is likely a viral illness  Advised supportive measures   -Fluticasone Nasal Spray for PND and congestion  -You can use OTC zyrtec or claritin  You can OTC mucinex  -Use a humidifier next to your bed  Take steam showers  -You can take Advil or Tylenol for fever or pain  -Stay very well hydrated and rest   -If your symptoms persist or worsen follow up immediately with your PCP    Follow up with PCP in 3-5 days  Proceed to  ER if symptoms worsen  Chief Complaint     Chief Complaint   Patient presents with    Cold Like Symptoms     Pt here ill x4 days pt states   chets pain, dry cough, headache,  head congestion, no fever  Pt used Advil Cold and Sinus and Tylenol  History of Present Illness       The patient presents today for a four day hx of dry cough, congestion, headache and myalgias  The patient states that her sx began with PND and fatigue that progressed to dry cough  She states that today she began to experience myalgias and worsening nasal congestion  She has been taking Tylenol for the body aches with moderate relief  She denies sick contacts or recent travel  She denies fever, chills, dizziness, weakness, dyspnea, wheezing, diaphoresis, palpitations  She denies a hx of asthma or smoking  Review of Systems   Review of Systems   Constitutional: Positive for fatigue  Negative for activity change, appetite change, chills, diaphoresis and fever  HENT: Positive for congestion, postnasal drip and rhinorrhea   Negative for ear discharge, ear pain, facial swelling, hearing loss, sinus pressure, sinus pain, sneezing, sore throat, tinnitus, trouble swallowing and voice change  Respiratory: Positive for cough  Negative for chest tightness, shortness of breath, wheezing and stridor  Cardiovascular: Negative for chest pain, palpitations and leg swelling  Gastrointestinal: Negative for abdominal pain, diarrhea, nausea and vomiting  Musculoskeletal: Positive for myalgias  Negative for arthralgias, joint swelling, neck pain and neck stiffness  Skin: Negative for rash  Allergic/Immunologic: Negative for immunocompromised state  Neurological: Positive for headaches  Negative for dizziness, weakness, light-headedness and numbness  Hematological: Negative for adenopathy  Current Medications   Suhail FE  Multivitamin   Biotin       Current Allergies     Allergies as of 2020    (No Known Allergies)            The following portions of the patient's history were reviewed and updated as appropriate: allergies, current medications, past family history, past medical history, past social history, past surgical history and problem list      Past Medical History:   Diagnosis Date    Patient denies medical problems        Past Surgical History:   Procedure Laterality Date     SECTION         Family History   Problem Relation Age of Onset    Breast cancer Mother     GI problems Father          Medications have been verified  Objective   /68 (BP Location: Right arm, Patient Position: Sitting, Cuff Size: Standard)   Pulse 60   Temp 98 2 °F (36 8 °C) (Tympanic)   Resp 20   Ht 5' 6" (1 676 m)   Wt 75 9 kg (167 lb 6 4 oz)   SpO2 100%   BMI 27 02 kg/m²        Physical Exam     Physical Exam   Constitutional: She is oriented to person, place, and time  She appears well-developed and well-nourished  No distress  HENT:   Head: Normocephalic and atraumatic     Right Ear: Hearing, tympanic membrane, external ear and ear canal normal    Left Ear: Hearing, tympanic membrane, external ear and ear canal normal    Nose: Mucosal edema and rhinorrhea present  Right sinus exhibits no maxillary sinus tenderness and no frontal sinus tenderness  Left sinus exhibits no maxillary sinus tenderness and no frontal sinus tenderness  Mouth/Throat: Uvula is midline, oropharynx is clear and moist and mucous membranes are normal  No uvula swelling  No oropharyngeal exudate, posterior oropharyngeal edema, posterior oropharyngeal erythema or tonsillar abscesses  Neck: Normal range of motion  Neck supple  Cardiovascular: Normal rate, regular rhythm, S1 normal and S2 normal  Exam reveals no gallop, no S3, no S4, no distant heart sounds and no friction rub  No murmur heard  Pulmonary/Chest: No accessory muscle usage  No tachypnea and no bradypnea  No respiratory distress  She has no decreased breath sounds  She has no wheezes  She has no rhonchi  She has no rales  Lymphadenopathy:     She has no cervical adenopathy  Neurological: She is alert and oriented to person, place, and time  Skin: She is not diaphoretic  Psychiatric: She has a normal mood and affect  Her behavior is normal    Nursing note and vitals reviewed

## 2020-12-07 ENCOUNTER — OFFICE VISIT (OUTPATIENT)
Dept: FAMILY MEDICINE CLINIC | Facility: CLINIC | Age: 40
End: 2020-12-07
Payer: COMMERCIAL

## 2020-12-07 VITALS
WEIGHT: 168 LBS | DIASTOLIC BLOOD PRESSURE: 68 MMHG | TEMPERATURE: 98.3 F | HEART RATE: 85 BPM | HEIGHT: 65 IN | OXYGEN SATURATION: 98 % | BODY MASS INDEX: 27.99 KG/M2 | SYSTOLIC BLOOD PRESSURE: 130 MMHG | RESPIRATION RATE: 18 BRPM

## 2020-12-07 DIAGNOSIS — Z00.00 PREVENTATIVE HEALTH CARE: ICD-10-CM

## 2020-12-07 DIAGNOSIS — Z11.4 SCREENING FOR HIV (HUMAN IMMUNODEFICIENCY VIRUS): ICD-10-CM

## 2020-12-07 DIAGNOSIS — Z13.220 SCREENING FOR LIPID DISORDERS: ICD-10-CM

## 2020-12-07 DIAGNOSIS — M54.32 NEURALGIA OF LEFT SCIATIC NERVE: Primary | ICD-10-CM

## 2020-12-07 DIAGNOSIS — Z76.89 ENCOUNTER TO ESTABLISH CARE: ICD-10-CM

## 2020-12-07 DIAGNOSIS — Z13.0 SCREENING, DEFICIENCY ANEMIA, IRON: ICD-10-CM

## 2020-12-07 DIAGNOSIS — F41.9 ANXIETY: ICD-10-CM

## 2020-12-07 DIAGNOSIS — Z13.29 SCREENING FOR THYROID DISORDER: ICD-10-CM

## 2020-12-07 DIAGNOSIS — R00.2 PALPITATIONS: ICD-10-CM

## 2020-12-07 DIAGNOSIS — F45.41 STRESS HEADACHES: ICD-10-CM

## 2020-12-07 DIAGNOSIS — Z13.1 SCREENING FOR DIABETES MELLITUS: ICD-10-CM

## 2020-12-07 DIAGNOSIS — Z87.898 HISTORY OF PALPITATIONS: ICD-10-CM

## 2020-12-07 PROBLEM — J06.9 ACUTE URI: Status: RESOLVED | Noted: 2019-11-13 | Resolved: 2020-12-07

## 2020-12-07 PROBLEM — J02.0 STREP PHARYNGITIS: Status: RESOLVED | Noted: 2019-07-29 | Resolved: 2020-12-07

## 2020-12-07 PROCEDURE — 99204 OFFICE O/P NEW MOD 45 MIN: CPT | Performed by: NURSE PRACTITIONER

## 2020-12-07 RX ORDER — ASCORBIC ACID 1000 MG
40 TABLET ORAL DAILY
COMMUNITY
End: 2021-11-18

## 2020-12-08 PROBLEM — Z87.898 HISTORY OF PALPITATIONS: Status: ACTIVE | Noted: 2020-12-08

## 2020-12-08 PROBLEM — F41.9 ANXIETY: Status: ACTIVE | Noted: 2020-12-08

## 2020-12-08 PROBLEM — F45.41 STRESS HEADACHES: Status: ACTIVE | Noted: 2020-12-08

## 2020-12-14 DIAGNOSIS — M54.32 NEURALGIA OF LEFT SCIATIC NERVE: Primary | ICD-10-CM

## 2020-12-23 ENCOUNTER — VBI (OUTPATIENT)
Dept: ADMINISTRATIVE | Facility: OTHER | Age: 40
End: 2020-12-23

## 2021-01-08 ENCOUNTER — TELEPHONE (OUTPATIENT)
Dept: ADMINISTRATIVE | Facility: OTHER | Age: 41
End: 2021-01-08

## 2021-01-08 ENCOUNTER — OFFICE VISIT (OUTPATIENT)
Dept: FAMILY MEDICINE CLINIC | Facility: CLINIC | Age: 41
End: 2021-01-08
Payer: COMMERCIAL

## 2021-01-08 VITALS
TEMPERATURE: 98 F | BODY MASS INDEX: 28.66 KG/M2 | WEIGHT: 172 LBS | HEIGHT: 65 IN | RESPIRATION RATE: 18 BRPM | HEART RATE: 73 BPM | OXYGEN SATURATION: 98 % | SYSTOLIC BLOOD PRESSURE: 140 MMHG | DIASTOLIC BLOOD PRESSURE: 80 MMHG

## 2021-01-08 DIAGNOSIS — Z13.220 SCREENING FOR LIPID DISORDERS: ICD-10-CM

## 2021-01-08 DIAGNOSIS — Z13.1 SCREENING FOR DIABETES MELLITUS: ICD-10-CM

## 2021-01-08 DIAGNOSIS — Z13.29 SCREENING FOR THYROID DISORDER: ICD-10-CM

## 2021-01-08 DIAGNOSIS — Z00.00 ENCOUNTER FOR ANNUAL GENERAL MEDICAL EXAMINATION WITHOUT ABNORMAL FINDINGS IN ADULT: Primary | ICD-10-CM

## 2021-01-08 DIAGNOSIS — Z13.0 SCREENING FOR DEFICIENCY ANEMIA: ICD-10-CM

## 2021-01-08 LAB
SL AMB  POCT GLUCOSE, UA: 0
SL AMB LEUKOCYTE ESTERASE,UA: 0
SL AMB POCT BILIRUBIN,UA: 0
SL AMB POCT BLOOD,UA: 50
SL AMB POCT CLARITY,UA: CLEAR
SL AMB POCT COLOR,UA: YELLOW
SL AMB POCT KETONES,UA: 0
SL AMB POCT NITRITE,UA: 0
SL AMB POCT PH,UA: 5
SL AMB POCT SPECIFIC GRAVITY,UA: 1.02
SL AMB POCT URINE PROTEIN: 0
SL AMB POCT UROBILINOGEN: 0

## 2021-01-08 PROCEDURE — 81003 URINALYSIS AUTO W/O SCOPE: CPT | Performed by: NURSE PRACTITIONER

## 2021-01-08 PROCEDURE — 36415 COLL VENOUS BLD VENIPUNCTURE: CPT | Performed by: NURSE PRACTITIONER

## 2021-01-08 PROCEDURE — 99395 PREV VISIT EST AGE 18-39: CPT | Performed by: NURSE PRACTITIONER

## 2021-01-08 RX ORDER — MELOXICAM 15 MG/1
15 TABLET ORAL DAILY
COMMUNITY
Start: 2020-12-22 | End: 2021-05-26 | Stop reason: ALTCHOICE

## 2021-01-08 RX ORDER — METHYLPREDNISOLONE 4 MG/1
TABLET ORAL
COMMUNITY
Start: 2020-12-22 | End: 2021-01-08 | Stop reason: ALTCHOICE

## 2021-01-08 NOTE — TELEPHONE ENCOUNTER
----- Message from Maude Miner LPN sent at 6/0/3270 10:07 AM EST -----  Regarding: Care Gap Requst  01/08/21 10:07 AM    Hello, our patient attached above has had Pap Smear (HPV) aka Cervical Cancer Screening completed/performed  Please assist in updating the patient chart by making an External outreach to [facility] facility located in [address] [place] [address],  Dr Guanaco Duran  The date of service is 12/18/2020      Thank you,  Maude Miner LPN  [address]

## 2021-01-08 NOTE — LETTER
Procedure Request Form: Cervical Cancer Screening      Date Requested: 21  Patient: Sonal Gutierrez  Patient : 1980   Referring Provider: Thierry Zuniga, MEJIA        Date of Procedure ______________________________       The above patient has informed us that they have completed their   most recent Cervical Cancer Screening at your facility  Please complete   this form and attach all corresponding procedure reports/results  Comments __________________________________________________________  ____________________________________________________________________  ____________________________________________________________________  ____________________________________________________________________    Facility Completing Procedure _________________________________________    Form Completed By (print name) _______________________________________      Signature __________________________________________________________      These reports are needed for  compliance    Please fax this completed form and a copy of the procedure report to our office located at [place] [address] as soon as possible to [phone] attention [place]: Phone [phone]    We thank you for your assistance in treating our mutual patient     ([facility] to [address] [place] [address] [facility]

## 2021-01-08 NOTE — PROGRESS NOTES
[facility] HEALTH MAINTENANCE OFFICE VISIT  [facility]    NAME: Mayda Truong  AGE: 47 y o  SEX: female  : 1980     DATE: 2021    Assessment and Plan     1  Encounter for annual general medical examination without abnormal findings in adult  Comments:  Age appropriate screenings and recommendations discussed  Orders:  -     POCT urine dip auto non-scope  -     CBC and differential  -     Comprehensive metabolic panel  -     TSH, 3rd generation  -     Lipid panel    2  Screening for deficiency anemia  -     CBC and differential    3  Screening for diabetes mellitus  -     Comprehensive metabolic panel    4  Screening for thyroid disorder  -     TSH, 3rd generation    5  Screening for lipid disorders  -     Lipid panel        · Patient Counseling:   · Nutrition: Stressed importance of a well balanced diet, moderation of sodium/saturated fat, caloric balance and sufficient intake of fiber  · Exercise: Stressed the importance of regular exercise with a goal of 150 minutes per week  · Dental Health: Discussed daily flossing and brushing and regular dental visits   · Alcohol Use:  Recommended moderation of alcohol intake  · Injury Prevention: Discussed Safety Belts, Safety Helmets, and Smoke Detectors    · Immunizations reviewed: Risks and Benefits discussed  · Discussed benefits of:  Cervical Cancer screening and Screening labs   BMI Counseling: Body mass index is 29 07 kg/m²  Discussed with patient's BMI with her  The BMI is above normal  Nutrition recommendations include reducing portion sizes, decreasing overall calorie intake, 3-5 servings of fruits/vegetables daily, reducing fast food intake, consuming healthier snacks, decreasing soda and/or juice intake, moderation in carbohydrate intake, increasing intake of lean protein, reducing intake of saturated fat and trans fat and reducing intake of cholesterol   Exercise recommendations include moderate aerobic physical activity for 150 minutes/week and exercising 3-5 times per week  Return in about 6 months (around 7/8/2021) for Recheck  Chief Complaint     Chief Complaint   Patient presents with    Physical Exam       History of Present Illness     Gwen Feldman is a 47year old female who presents to the office for her annual physical exam  No acute complaints today  Well Adult Physical   Patient here for a comprehensive physical exam       Diet and Physical Activity  Diet: well balanced diet  Exercise: frequently      Depression Screen  PHQ-9 Depression Screening    PHQ-9:   Frequency of the following problems over the past two weeks:              General Health  Hearing: Normal:  bilateral  Vision: goes for regular eye exams and wears glasses  Dental: regular dental visits    Reproductive Health  Regular Periods and Follows with gynecologist      The following portions of the patient's history were reviewed and updated as appropriate: allergies, current medications, past family history, past medical history, past social history, past surgical history and problem list     Review of Systems     Review of Systems   Constitutional: Negative for diaphoresis, fatigue and fever  HENT: Negative for ear pain and hearing loss  Eyes: Negative for pain and visual disturbance  Respiratory: Negative for chest tightness and shortness of breath  Cardiovascular: Negative for chest pain, palpitations and leg swelling  Gastrointestinal: Negative for abdominal pain, constipation and diarrhea  Genitourinary: Negative for difficulty urinating  Musculoskeletal: Negative for arthralgias and myalgias  Skin: Negative for rash  Neurological: Negative for dizziness, numbness and headaches  Psychiatric/Behavioral: Negative for sleep disturbance         Past Medical History     Past Medical History:   Diagnosis Date    Anxiety 12/8/2020    Patient denies medical problems        Past Surgical History     Past Surgical History:   Procedure Laterality Date     SECTION         Social History     Social History     Socioeconomic History    Marital status: /Civil Union     Spouse name: None    Number of children: None    Years of education: None    Highest education level: None   Occupational History    None   Social Needs    Financial resource strain: None    Food insecurity     Worry: None     Inability: None    Transportation needs     Medical: None     Non-medical: None   Tobacco Use    Smoking status: Former Smoker     Packs/day: 1 00     Years: 10 00     Pack years: 10 00     Types: Cigarettes    Smokeless tobacco: Never Used   Substance and Sexual Activity    Alcohol use: Yes     Frequency: 2-4 times a month     Drinks per session: 1 or 2     Binge frequency: Never     Comment: social    Drug use: No    Sexual activity: None   Lifestyle    Physical activity     Days per week: None     Minutes per session: None    Stress: None   Relationships    Social connections     Talks on phone: None     Gets together: None     Attends [employer] service: None     Active member of club or organization: None     Attends meetings of clubs or organizations: None     Relationship status: None    Intimate partner violence     Fear of current or ex partner: None     Emotionally abused: None     Physically abused: None     Forced sexual activity: None   Other Topics Concern    None   Social History Narrative    None       Family History     Family History   Problem Relation Age of Onset    Breast cancer Mother     GI problems Father     Depression Father     Substance Abuse Neg Hx        Current Medications       Current Outpatient Medications:     BIOTIN PO, Take by mouth, Disp: , Rfl:     Ginkgo Biloba 40 MG TABS, Take 40 mg by mouth daily, Disp: , Rfl:     VAHE FE 1  1 5-30 MG-MCG tablet, , Disp: , Rfl: 0    multivitamin (THERAGRAN) TABS, Take by mouth, Disp: , Rfl:    patient supplied medication, Take 1 each by mouth daily CBD GUMMY, Disp: , Rfl:     patient supplied medication, Take 1 each by mouth daily CBD OIL ONE DROPPER DAILY, Disp: , Rfl:     meloxicam (MOBIC) 15 mg tablet, Take 15 mg by mouth daily, Disp: , Rfl:      Allergies     No Known Allergies    Objective     /80   Pulse 73   Temp 98 °F (36 7 °C) (Temporal)   Resp 18   Ht 5' 4 5" (1 638 m)   Wt 78 kg (172 lb)   SpO2 98%   BMI 29 07 kg/m²      Physical Exam  Vitals signs reviewed  Constitutional:       General: She is not in acute distress  Appearance: Normal appearance  She is well-developed  She is not diaphoretic  HENT:      Head: Normocephalic and atraumatic  Right Ear: Tympanic membrane, ear canal and external ear normal       Left Ear: Tympanic membrane, ear canal and external ear normal    Eyes:      General: Lids are normal       Extraocular Movements: Extraocular movements intact  Conjunctiva/sclera: Conjunctivae normal       Pupils: Pupils are equal, round, and reactive to light  Pupils are equal    Neck:      Musculoskeletal: Full passive range of motion without pain, normal range of motion and neck supple  Thyroid: No thyroid mass or thyromegaly  Vascular: No carotid bruit  Cardiovascular:      Rate and Rhythm: Normal rate and regular rhythm  Pulses: Normal pulses  Heart sounds: Normal heart sounds, S1 normal and S2 normal  No murmur  Pulmonary:      Effort: Pulmonary effort is normal       Breath sounds: Normal breath sounds  No decreased breath sounds, wheezing, rhonchi or rales  Abdominal:      General: Bowel sounds are normal  There is no distension  Palpations: Abdomen is soft  Tenderness: There is no abdominal tenderness  Musculoskeletal: Normal range of motion  Right lower leg: No edema  Left lower leg: No edema  Lymphadenopathy:      Cervical: No cervical adenopathy        Upper Body:      Right upper body: No supraclavicular adenopathy  Left upper body: No supraclavicular adenopathy  Skin:     General: Skin is warm and dry  Findings: No rash  Neurological:      Mental Status: She is alert and oriented to person, place, and time  Cranial Nerves: No cranial nerve deficit  Sensory: No sensory deficit  Coordination: Coordination normal       Deep Tendon Reflexes: Reflexes are normal and symmetric  Psychiatric:         Speech: Speech normal          Behavior: Behavior normal          Thought Content:  Thought content normal          Judgment: Judgment normal              Anila Burns, [address] [facility]

## 2021-01-09 LAB
ALBUMIN SERPL-MCNC: 4.2 G/DL (ref 3.8–4.8)
ALBUMIN/GLOB SERPL: 1.9 {RATIO} (ref 1.2–2.2)
ALP SERPL-CCNC: 58 IU/L (ref 39–117)
ALT SERPL-CCNC: 12 IU/L (ref 0–32)
AST SERPL-CCNC: 24 IU/L (ref 0–40)
BASOPHILS # BLD AUTO: 0.1 X10E3/UL (ref 0–0.2)
BASOPHILS NFR BLD AUTO: 1 %
BILIRUB SERPL-MCNC: 0.4 MG/DL (ref 0–1.2)
BUN SERPL-MCNC: 12 MG/DL (ref 6–20)
BUN/CREAT SERPL: 17 (ref 9–23)
CALCIUM SERPL-MCNC: 9 MG/DL (ref 8.7–10.2)
CHLORIDE SERPL-SCNC: 104 MMOL/L (ref 96–106)
CHOLEST SERPL-MCNC: 185 MG/DL (ref 100–199)
CHOLEST/HDLC SERPL: 3.9 RATIO (ref 0–4.4)
CO2 SERPL-SCNC: 22 MMOL/L (ref 20–29)
CREAT SERPL-MCNC: 0.71 MG/DL (ref 0.57–1)
EOSINOPHIL # BLD AUTO: 0.1 X10E3/UL (ref 0–0.4)
EOSINOPHIL NFR BLD AUTO: 2 %
ERYTHROCYTE [DISTWIDTH] IN BLOOD BY AUTOMATED COUNT: 12.3 % (ref 11.7–15.4)
GLOBULIN SER-MCNC: 2.2 G/DL (ref 1.5–4.5)
GLUCOSE SERPL-MCNC: 82 MG/DL (ref 65–99)
HCT VFR BLD AUTO: 39.2 % (ref 34–46.6)
HDLC SERPL-MCNC: 47 MG/DL
HGB BLD-MCNC: 13 G/DL (ref 11.1–15.9)
IMM GRANULOCYTES # BLD: 0 X10E3/UL (ref 0–0.1)
IMM GRANULOCYTES NFR BLD: 0 %
LDLC SERPL CALC-MCNC: 106 MG/DL (ref 0–99)
LYMPHOCYTES # BLD AUTO: 2.5 X10E3/UL (ref 0.7–3.1)
LYMPHOCYTES NFR BLD AUTO: 35 %
MCH RBC QN AUTO: 28.9 PG (ref 26.6–33)
MCHC RBC AUTO-ENTMCNC: 33.2 G/DL (ref 31.5–35.7)
MCV RBC AUTO: 87 FL (ref 79–97)
MONOCYTES # BLD AUTO: 0.5 X10E3/UL (ref 0.1–0.9)
MONOCYTES NFR BLD AUTO: 7 %
NEUTROPHILS # BLD AUTO: 3.9 X10E3/UL (ref 1.4–7)
NEUTROPHILS NFR BLD AUTO: 55 %
PLATELET # BLD AUTO: 279 X10E3/UL (ref 150–450)
POTASSIUM SERPL-SCNC: 4.1 MMOL/L (ref 3.5–5.2)
PROT SERPL-MCNC: 6.4 G/DL (ref 6–8.5)
RBC # BLD AUTO: 4.5 X10E6/UL (ref 3.77–5.28)
SL AMB EGFR AFRICAN AMERICAN: 125 ML/MIN/1.73
SL AMB EGFR NON AFRICAN AMERICAN: 108 ML/MIN/1.73
SL AMB VLDL CHOLESTEROL CALC: 32 MG/DL (ref 5–40)
SODIUM SERPL-SCNC: 138 MMOL/L (ref 134–144)
TRIGL SERPL-MCNC: 182 MG/DL (ref 0–149)
TSH SERPL DL<=0.005 MIU/L-ACNC: 1.49 UIU/ML (ref 0.45–4.5)
WBC # BLD AUTO: 7.1 X10E3/UL (ref 3.4–10.8)

## 2021-01-11 NOTE — TELEPHONE ENCOUNTER
Upon review of the In Basket request and the patient's chart, initial outreach has been made via fax, please see Contacts section for details       Thank you  Tesfaye Gagnon MA

## 2021-01-12 NOTE — TELEPHONE ENCOUNTER
Upon review of the In Basket request we were able to locate, review, and update the patient chart as requested for Pap Smear (HPV) aka Cervical Cancer Screening  Any additional questions or concerns should be emailed to the Practice Liaisons via [email or web address] email, please do not reply via In Basket      Thank you  Rosy Jones MA

## 2021-01-13 ENCOUNTER — TELEMEDICINE (OUTPATIENT)
Dept: FAMILY MEDICINE CLINIC | Facility: CLINIC | Age: 41
End: 2021-01-13
Payer: COMMERCIAL

## 2021-01-13 VITALS — TEMPERATURE: 97.2 F | BODY MASS INDEX: 28.66 KG/M2 | WEIGHT: 172 LBS | HEIGHT: 65 IN

## 2021-01-13 DIAGNOSIS — E78.2 MIXED HYPERLIPIDEMIA: Primary | ICD-10-CM

## 2021-01-13 PROCEDURE — 99213 OFFICE O/P EST LOW 20 MIN: CPT | Performed by: NURSE PRACTITIONER

## 2021-01-13 NOTE — PROGRESS NOTES
Virtual Regular Visit      Assessment/Plan:    Problem List Items Addressed This Visit        Other    Mixed hyperlipidemia - Primary     Recommend heart healthy/mediteranean style eating  Discussed nutrition and exercise in detail  Recommend omega 3 and red rice yeast supplementation  Recheck in 6 months  Relevant Orders    Lipid panel               Reason for visit is   Chief Complaint   Patient presents with    Follow-up     Cholesterol BW    Virtual Regular Visit        Encounter provider MEJIA Rojo    Provider located at [address] [place] [id number]      Recent Visits  Date Type Provider Dept   01/08/21 Office Visit Aishwarya Marshall, [facility]   Showing recent visits within past 7 days and meeting all other requirements     Today's Visits  Date Type Provider Dept   01/13/21 Telemedicine Aishwarya Marshall [facility]   Showing today's visits and meeting all other requirements     Future Appointments  No visits were found meeting these conditions  Showing future appointments within next 150 days and meeting all other requirements        The patient was identified by name and date of birth  Stanley Peters was informed that this is a telemedicine visit and that the visit is being conducted through [facility] and patient was informed that this is a secure, HIPAA-compliant platform  She agrees to proceed     My office door was closed  No one else was in the room  She acknowledged consent and understanding of privacy and security of the video platform  The patient has agreed to participate and understands they can discontinue the visit at any time  Patient is aware this is a billable service  Maine Linn is a 47year old female who is scheduled for a virtual visit to discuss recent lab work with elevated cholesterol          Past Medical History: Diagnosis Date    Anxiety 2020    Patient denies medical problems        Past Surgical History:   Procedure Laterality Date     SECTION         Current Outpatient Medications   Medication Sig Dispense Refill    BIOTIN PO Take by mouth      Ginkgo Biloba 40 MG TABS Take 40 mg by mouth daily      VAHE FE 1  1 5-30 MG-MCG tablet   0    multivitamin (THERAGRAN) TABS Take by mouth      meloxicam (MOBIC) 15 mg tablet Take 15 mg by mouth daily      patient supplied medication Take 1 each by mouth daily CBD GUMMY      patient supplied medication Take 1 each by mouth daily CBD OIL  ONE DROPPER DAILY       No current facility-administered medications for this visit  No Known Allergies    Review of Systems   Respiratory: Negative for cough and chest tightness  Cardiovascular: Negative for leg swelling  Video Exam    Vitals:    21 0927   Temp: (!) 97 2 °F (36 2 °C)   TempSrc: Temporal   Weight: 78 kg (172 lb)   Height: 5' 4 5" (1 638 m)       Physical Exam  Constitutional:       Appearance: Normal appearance  HENT:      Head: Normocephalic and atraumatic  Neurological:      Mental Status: She is alert and oriented to person, place, and time  Psychiatric:         Mood and Affect: Mood normal           I spent 15 minutes directly with the patient during this visit      VIRTUAL VISIT DISCLAIMER    Meryle Kappa acknowledges that she has consented to an online visit or consultation  She understands that the online visit is based solely on information provided by her, and that, in the absence of a face-to-face physical evaluation by the physician, the diagnosis she receives is both limited and provisional in terms of accuracy and completeness  This is not intended to replace a full medical face-to-face evaluation by the physician  Meryle Kappa understands and accepts these terms

## 2021-01-13 NOTE — ASSESSMENT & PLAN NOTE
Recommend heart healthy/mediteranean style eating  Discussed nutrition and exercise in detail  Recommend omega 3 and red rice yeast supplementation  Recheck in 6 months

## 2021-05-03 ENCOUNTER — TELEPHONE (OUTPATIENT)
Dept: FAMILY MEDICINE CLINIC | Facility: CLINIC | Age: 41
End: 2021-05-03

## 2021-05-03 DIAGNOSIS — F41.9 ANXIETY: ICD-10-CM

## 2021-05-03 DIAGNOSIS — F41.0 PANIC ATTACKS: Primary | ICD-10-CM

## 2021-05-03 RX ORDER — ALPRAZOLAM 0.25 MG/1
0.25 TABLET ORAL
Qty: 30 TABLET | Refills: 0 | Status: SHIPPED | OUTPATIENT
Start: 2021-05-03

## 2021-05-03 NOTE — TELEPHONE ENCOUNTER
Naomi Forbes has been seeing the cardiologist for PVC  She had all test and was told her heart is healthy and just having anxiety  She was put on sertraline 25mg at night  She has been put on it a week and not feeling any relief  She is under a lot of stress at this time    Do you know how long it will take to kick in?   [phone]

## 2021-05-26 ENCOUNTER — OFFICE VISIT (OUTPATIENT)
Dept: FAMILY MEDICINE CLINIC | Facility: CLINIC | Age: 41
End: 2021-05-26
Payer: COMMERCIAL

## 2021-05-26 VITALS
DIASTOLIC BLOOD PRESSURE: 72 MMHG | BODY MASS INDEX: 28.32 KG/M2 | HEIGHT: 65 IN | SYSTOLIC BLOOD PRESSURE: 116 MMHG | RESPIRATION RATE: 12 BRPM | TEMPERATURE: 98.1 F | WEIGHT: 170 LBS | HEART RATE: 84 BPM | OXYGEN SATURATION: 99 %

## 2021-05-26 DIAGNOSIS — R10.32 LLQ ABDOMINAL PAIN: ICD-10-CM

## 2021-05-26 DIAGNOSIS — M54.50 ACUTE BILATERAL LOW BACK PAIN WITHOUT SCIATICA: ICD-10-CM

## 2021-05-26 DIAGNOSIS — F41.9 ANXIETY: Primary | ICD-10-CM

## 2021-05-26 DIAGNOSIS — Z87.898 HISTORY OF PALPITATIONS: ICD-10-CM

## 2021-05-26 DIAGNOSIS — R19.7 DIARRHEA, UNSPECIFIED TYPE: ICD-10-CM

## 2021-05-26 PROCEDURE — 99214 OFFICE O/P EST MOD 30 MIN: CPT | Performed by: NURSE PRACTITIONER

## 2021-05-26 RX ORDER — SERTRALINE HYDROCHLORIDE 25 MG/1
25 TABLET, FILM COATED ORAL DAILY
COMMUNITY
Start: 2021-04-26 | End: 2021-05-26

## 2021-05-26 NOTE — ASSESSMENT & PLAN NOTE
Abdominal exam wnl in office today  Pt went to the ED and reports her CT was wnl  Advise she have follow up with GI for further evaluation/possible colonoscopy  Recommend nutrition and hydration as tolerated

## 2021-05-26 NOTE — ASSESSMENT & PLAN NOTE
Pt reports no change in her symptoms of anxiety with use of sertraline 25 mg daily  Advise we increase dosing to 50 mg and consult in about 6 weeks  Recommend positive coping and self care

## 2021-05-26 NOTE — PROGRESS NOTES
Assessment/Plan:    1  Anxiety  Assessment & Plan:  Pt reports no change in her symptoms of anxiety with use of sertraline 25 mg daily  Advise we increase dosing to 50 mg and consult in about 6 weeks  Recommend positive coping and self care  Orders:  -     sertraline (ZOLOFT) 50 mg tablet; Take 1 tablet (50 mg total) by mouth daily    2  LLQ abdominal pain  Assessment & Plan:  Abdominal exam wnl in office today  Pt went to the ED and reports her CT was wnl  Advise she have follow up with GI for further evaluation/possible colonoscopy  Recommend nutrition and hydration as tolerated  Orders:  -     Ambulatory referral to Gastroenterology; Future    3  Acute bilateral low back pain without sciatica  -     Ambulatory referral to Gastroenterology; Future    4  Diarrhea, unspecified type  -     Ambulatory referral to Gastroenterology; Future    5  History of palpitations  Assessment & Plan:  Pt reports cardiac work up negative  - will request those records for review  Symptoms currently resolved  Encourage hydration, anxiety management  BMI Counseling: Body mass index is 28 73 kg/m²  The BMI is above normal  Nutrition recommendations include encouraging healthy choices of fruits and vegetables  Exercise recommendations include exercising 3-5 times per week  PHQ-9 Depression Screening    PHQ-9:   Frequency of the following problems over the past two weeks:      Little interest or pleasure in doing things: 0 - not at all  Feeling down, depressed, or hopeless: 0 - not at all  PHQ-2 Score: 0          Return in about 2 months (around 7/26/2021) for Recheck  Subjective:      Patient ID: Juarez Cramer is a 46 y o  female  Chief Complaint   Patient presents with    Follow-up     Pt here for a cardiac f/u  Madeleine Barber is a 46year old female who presents to the office for follow up of palpitations and also ER follow for abdominal pain   Pt reports she awoke one week ago with lower, bilateral back pain and abdominal pain  States she felt feverish and was having nausea and diarrhea  Pt's pain became so bad that she went to the ED for further evaluation of her symptoms  Reports her symptoms have been recurrent intermittently and less intense than her first episode  She does not associated the pain with meals particularly  Reports her CT scan was normal  Symptoms have been ongoing x's 2 weeks  The following portions of the patient's history were reviewed and updated as appropriate: allergies, current medications, past family history, past medical history, past social history, past surgical history and problem list     Review of Systems   Constitutional: Negative for chills, fatigue and fever  Respiratory: Negative for cough, chest tightness and shortness of breath  Cardiovascular: Positive for palpitations (chronic, intermittent)  Negative for chest pain and leg swelling  Gastrointestinal: Positive for abdominal pain (intermittent)  Negative for constipation, diarrhea, nausea and vomiting  Current Outpatient Medications   Medication Sig Dispense Refill    ALPRAZolam (XANAX) 0 25 mg tablet Take 1 tablet (0 25 mg total) by mouth daily at bedtime as needed for anxiety 30 tablet 0    Biotin w/ Vitamins C & E (HAIR/SKIN/NAILS PO) Take by mouth daily      VAHE FE 1 5/30 1 5-30 MG-MCG tablet   0    multivitamin (THERAGRAN) TABS Take by mouth      Ginkgo Biloba 40 MG TABS Take 40 mg by mouth daily      sertraline (ZOLOFT) 50 mg tablet Take 1 tablet (50 mg total) by mouth daily 90 tablet 0     No current facility-administered medications for this visit          Objective:    /72 (BP Location: Right arm, Patient Position: Sitting, Cuff Size: Adult)   Pulse 84   Temp 98 1 °F (36 7 °C) (Temporal)   Resp 12   Ht 5' 4 5" (1 638 m)   Wt 77 1 kg (170 lb)   LMP 05/18/2021 (Exact Date)   SpO2 99%   BMI 28 73 kg/m²        Physical Exam  Constitutional:       General: She is not in acute distress  Appearance: She is well-developed  She is not diaphoretic  HENT:      Head: Normocephalic and atraumatic  Eyes:      General:         Right eye: No discharge  Left eye: No discharge  Conjunctiva/sclera: Conjunctivae normal    Neck:      Musculoskeletal: Normal range of motion and neck supple  Thyroid: No thyromegaly  Cardiovascular:      Rate and Rhythm: Normal rate and regular rhythm  Heart sounds: Normal heart sounds  Pulmonary:      Effort: Pulmonary effort is normal  No respiratory distress  Breath sounds: Normal breath sounds  No decreased breath sounds, wheezing, rhonchi or rales  Abdominal:      General: Bowel sounds are normal       Palpations: Abdomen is soft  There is no hepatomegaly or splenomegaly  Tenderness: There is no abdominal tenderness  There is no right CVA tenderness or left CVA tenderness  Lymphadenopathy:      Cervical: No cervical adenopathy  Skin:     General: Skin is warm and dry  Findings: No rash  Neurological:      Mental Status: She is alert and oriented to person, place, and time  Psychiatric:         Behavior: Behavior normal          Thought Content:  Thought content normal          Judgment: Judgment normal                 MEJIA Harper

## 2021-05-26 NOTE — ASSESSMENT & PLAN NOTE
Pt reports cardiac work up negative  - will request those records for review  Symptoms currently resolved  Encourage hydration, anxiety management

## 2021-09-12 DIAGNOSIS — F41.9 ANXIETY: ICD-10-CM

## 2021-11-18 ENCOUNTER — OFFICE VISIT (OUTPATIENT)
Dept: FAMILY MEDICINE CLINIC | Facility: CLINIC | Age: 41
End: 2021-11-18
Payer: COMMERCIAL

## 2021-11-18 VITALS
OXYGEN SATURATION: 98 % | RESPIRATION RATE: 16 BRPM | HEIGHT: 65 IN | BODY MASS INDEX: 29.99 KG/M2 | SYSTOLIC BLOOD PRESSURE: 124 MMHG | TEMPERATURE: 97.6 F | DIASTOLIC BLOOD PRESSURE: 70 MMHG | WEIGHT: 180 LBS | HEART RATE: 81 BPM

## 2021-11-18 DIAGNOSIS — F43.21 GRIEF REACTION: ICD-10-CM

## 2021-11-18 DIAGNOSIS — F41.9 ANXIETY: Primary | ICD-10-CM

## 2021-11-18 DIAGNOSIS — Z23 NEED FOR INFLUENZA VACCINATION: ICD-10-CM

## 2021-11-18 PROCEDURE — 3008F BODY MASS INDEX DOCD: CPT | Performed by: NURSE PRACTITIONER

## 2021-11-18 PROCEDURE — 90471 IMMUNIZATION ADMIN: CPT

## 2021-11-18 PROCEDURE — 99214 OFFICE O/P EST MOD 30 MIN: CPT | Performed by: NURSE PRACTITIONER

## 2021-11-18 PROCEDURE — 90686 IIV4 VACC NO PRSV 0.5 ML IM: CPT

## 2021-11-18 RX ORDER — SERTRALINE HYDROCHLORIDE 25 MG/1
25 TABLET, FILM COATED ORAL DAILY
Qty: 90 TABLET | Refills: 0 | Status: SHIPPED | OUTPATIENT
Start: 2021-11-18 | End: 2021-12-09

## 2021-11-30 ENCOUNTER — TELEPHONE (OUTPATIENT)
Dept: FAMILY MEDICINE CLINIC | Facility: CLINIC | Age: 41
End: 2021-11-30

## 2021-11-30 ENCOUNTER — SOCIAL WORK (OUTPATIENT)
Dept: BEHAVIORAL/MENTAL HEALTH CLINIC | Facility: CLINIC | Age: 41
End: 2021-11-30
Payer: COMMERCIAL

## 2021-11-30 DIAGNOSIS — F41.9 ANXIETY: Primary | ICD-10-CM

## 2021-11-30 PROCEDURE — 90834 PSYTX W PT 45 MINUTES: CPT | Performed by: SOCIAL WORKER

## 2021-12-01 ENCOUNTER — TELEPHONE (OUTPATIENT)
Dept: PSYCHIATRY | Facility: CLINIC | Age: 41
End: 2021-12-01

## 2021-12-06 ENCOUNTER — PATIENT MESSAGE (OUTPATIENT)
Dept: FAMILY MEDICINE CLINIC | Facility: CLINIC | Age: 41
End: 2021-12-06

## 2021-12-06 DIAGNOSIS — F43.21 GRIEF REACTION: ICD-10-CM

## 2021-12-06 DIAGNOSIS — F41.9 ANXIETY: Primary | ICD-10-CM

## 2021-12-09 RX ORDER — SERTRALINE HYDROCHLORIDE 100 MG/1
100 TABLET, FILM COATED ORAL DAILY
Qty: 90 TABLET | Refills: 3 | Status: SHIPPED | OUTPATIENT
Start: 2021-12-09 | End: 2022-03-10

## 2021-12-16 ENCOUNTER — SOCIAL WORK (OUTPATIENT)
Dept: BEHAVIORAL/MENTAL HEALTH CLINIC | Facility: CLINIC | Age: 41
End: 2021-12-16
Payer: COMMERCIAL

## 2021-12-16 DIAGNOSIS — F41.9 ANXIETY: Primary | ICD-10-CM

## 2021-12-16 PROCEDURE — 90834 PSYTX W PT 45 MINUTES: CPT | Performed by: SOCIAL WORKER

## 2021-12-30 ENCOUNTER — OFFICE VISIT (OUTPATIENT)
Dept: FAMILY MEDICINE CLINIC | Facility: CLINIC | Age: 41
End: 2021-12-30
Payer: COMMERCIAL

## 2021-12-30 ENCOUNTER — SOCIAL WORK (OUTPATIENT)
Dept: BEHAVIORAL/MENTAL HEALTH CLINIC | Facility: CLINIC | Age: 41
End: 2021-12-30
Payer: COMMERCIAL

## 2021-12-30 VITALS
TEMPERATURE: 98.1 F | WEIGHT: 181 LBS | HEIGHT: 65 IN | RESPIRATION RATE: 16 BRPM | DIASTOLIC BLOOD PRESSURE: 82 MMHG | BODY MASS INDEX: 30.16 KG/M2 | OXYGEN SATURATION: 98 % | HEART RATE: 96 BPM | SYSTOLIC BLOOD PRESSURE: 120 MMHG

## 2021-12-30 DIAGNOSIS — F41.9 ANXIETY: Primary | ICD-10-CM

## 2021-12-30 DIAGNOSIS — M79.642 PAIN OF LEFT HAND: Primary | ICD-10-CM

## 2021-12-30 DIAGNOSIS — F41.9 ANXIETY: ICD-10-CM

## 2021-12-30 DIAGNOSIS — S61.211D LACERATION OF LEFT INDEX FINGER WITHOUT FOREIGN BODY WITHOUT DAMAGE TO NAIL, SUBSEQUENT ENCOUNTER: ICD-10-CM

## 2021-12-30 PROCEDURE — 3008F BODY MASS INDEX DOCD: CPT | Performed by: NURSE PRACTITIONER

## 2021-12-30 PROCEDURE — 90834 PSYTX W PT 45 MINUTES: CPT | Performed by: SOCIAL WORKER

## 2021-12-30 PROCEDURE — 99214 OFFICE O/P EST MOD 30 MIN: CPT | Performed by: NURSE PRACTITIONER

## 2022-01-03 NOTE — PSYCH
This note was not shared with the patient due to this is a psychotherapy note      Assessment/Plan:      Diagnoses and all orders for this visit:    Anxiety        Subjective:     Patient ID: Pravin Navarro is a 46 y o  female who presented for her follow-up individual counseling session with undersigned therapist   Zulma Harp had seen her PCP prior to today's session as she cut her finger the week of Christmas and had to get stitches  Zulma Harp reports she's been doing well medically since our last visit other than the stitches  During today's session, Zulma Harp processed her current feelings about her friendships  Zulma Harp reports she has been having an increase in conflicts with friends, and feels that since her son, Trent's birth, that her friend dynamics have changed  The undersigned therapist assisted Zulma Harp process her feelings and challenge her desire to be accepted and wanted as part of a friend group  Juanbaljit Harp was able to identify her need to "latch on to groups or friends," and connect it to her lack of familial connection  Juanbaljit Katiuska reports she enjoys making friends and being social, but feels she struggles trusting others now after her recent experience with her "[address]" mom friends  Juanbaljit Harp spoke briefly about a time where her and her  Sharron Holder were considering separation, but she reports things have been well since then  Juanbaljit Katiuska did not appear open to talking about that period of time  Zulma Harp continues to struggle coping with loss and 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  Juanbaljit Katiuska denies suicidal ideation, plan or intent at this time  Zulma Harp continues to feel anxious and struggle "shutting her brain off," but reports she's working on being more mindful of what is in her control  Juanbaljit Katiuska would benefit from continued therapy  The undersigned therapist provided Zulma Harp with 45 minutes of psychotherapy          Review of Systems   Psychiatric/Behavioral: The patient is nervous/anxious  Objective:     Physical Exam  Psychiatric:         Attention and Perception: Attention and perception normal          Mood and Affect: Mood and affect normal          Speech: Speech normal          Behavior: Behavior is hyperactive  Behavior is cooperative  Thought Content: Thought content normal          Cognition and Memory: Cognition and memory normal          Judgment: Judgment normal       Comments: Madeleine Barber denies suicidal intent, gesture or ideation at this time

## 2022-01-06 ENCOUNTER — SOCIAL WORK (OUTPATIENT)
Dept: BEHAVIORAL/MENTAL HEALTH CLINIC | Facility: CLINIC | Age: 42
End: 2022-01-06
Payer: COMMERCIAL

## 2022-01-06 DIAGNOSIS — F41.9 ANXIETY: Primary | ICD-10-CM

## 2022-01-06 PROCEDURE — 90834 PSYTX W PT 45 MINUTES: CPT | Performed by: SOCIAL WORKER

## 2022-01-12 ENCOUNTER — RA CDI HCC (OUTPATIENT)
Dept: OTHER | Facility: HOSPITAL | Age: 42
End: 2022-01-12

## 2022-01-12 NOTE — PROGRESS NOTES
[facility]  coding opportunities       Chart reviewed, no opportunity found: CHART REVIEWED, NO OPPORTUNITY FOUND                        Patients insurance company: [employer] (Medicare and Commercial for [employer] and [employer])

## 2022-01-13 NOTE — PSYCH
This note was not shared with the patient due to this is a psychotherapy note   Assessment/Plan:      Diagnoses and all orders for this visit:    Anxiety          Subjective:     Patient ID: Katie Rey is a 46 y o  female who presents for her follow-up outpatient counseling appointment  During today's session Lilliam Pedroza discussed her New Year's Resolution and holiday festivities  Danishgarretelizabeth Ramirezkeely continues to feel she should not make new friends and has worked on trying to relax and take deep breaths  Lilliam Pedroza continues to feel overwhelmed and anxious with the inability to focus or complete tasks  Lilliam Pedroza reports the medication however, has been helpful in reducing these symptoms and she has also been working out from home  Lilliam Pedroza was able to process her feelings and negative self thoughts about friends that have stopped talking with her  Lilliam Pedroza admits she is constantly seeking approval and feels if she isn't invited it's because "people don't care about me "  The undersigned therapist assisted Lilliam Pedroza process her feelings about friendships and also identify the factual evidence that she is cared for  Danishgarretelizabeth Ramirezkeely continues to exhibit ADHD symptoms and expressed frustration that she cannot complete or finish ideas/arts and crafts that she has  Lilliam Pedroza reports she's been trying to be calmer and yell less to her children; but admits feeling overwhelmed at times  Megan's children have been home due to [address] causing her children's school to be virtual online from home  Lilliam Pedroza reports she has been enjoying this setting as she feels relaxed with her children at home  Lilliam Pedroza denies feeling depressed at this time but continues to feel anxious  Lilliam Pedroza denies any heart issues for the week  Lilliam Pedroza denies suicidal intent, gesture or ideation at this time  The undersigned therapist provided Lilliam Pedroza with 45 minutes of psychotherapy          Review of Systems   Psychiatric/Behavioral: The patient is nervous/anxious and is hyperactive  Objective:     Physical Exam  Psychiatric:         Attention and Perception: Attention and perception normal          Mood and Affect: Mood and affect normal          Speech: Speech is rapid and pressured  Behavior: Behavior is hyperactive  Thought Content:  Thought content normal          Cognition and Memory: Cognition and memory normal          Judgment: Judgment normal

## 2022-01-13 NOTE — PATIENT INSTRUCTIONS
Please continue to schedule a follow-up session within one week  In case of a psychiatric emergency please contact any of the following:  [facility] [phone] or [phone]     [place] Suicide Prevention Lifeline : 6-319.766.3468

## 2022-01-19 ENCOUNTER — TELEMEDICINE (OUTPATIENT)
Dept: FAMILY MEDICINE CLINIC | Facility: CLINIC | Age: 42
End: 2022-01-19
Payer: COMMERCIAL

## 2022-01-19 VITALS — BODY MASS INDEX: 30.16 KG/M2 | WEIGHT: 181 LBS | HEIGHT: 65 IN

## 2022-01-19 DIAGNOSIS — F41.9 ANXIETY: Primary | ICD-10-CM

## 2022-01-19 PROCEDURE — 99213 OFFICE O/P EST LOW 20 MIN: CPT | Performed by: NURSE PRACTITIONER

## 2022-01-19 PROCEDURE — 3008F BODY MASS INDEX DOCD: CPT | Performed by: NURSE PRACTITIONER

## 2022-01-19 NOTE — PROGRESS NOTES
Virtual Regular Visit    Verification of patient location:    Patient is located in the following state in which I hold an active license [place]      Assessment/Plan:    Problem List Items Addressed This Visit        Other    Anxiety - Primary     Pt has been taking 150 mg daily and doing very well overall  Encourage her to continue counseling as scheduled  Stable, no changes today  Reason for visit is   Chief Complaint   Patient presents with    Medication Management    Virtual Regular Visit        Encounter provider Oralia Long, [facility]    Provider located at [address] [place] [address] Road [address]      Recent Visits  Date Type Provider Dept   01/19/22 [facility], [address] [address] Physicians   Showing recent visits within past 7 days and meeting all other requirements  Future Appointments  No visits were found meeting these conditions  Showing future appointments within next 150 days and meeting all other requirements       The patient was identified by name and date of birth  Jose Tee was informed that this is a telemedicine visit and that the visit is being conducted through [address] [place] [address] Now and patient was informed that this is a secure, HIPAA-compliant platform  She agrees to proceed     My office door was closed  No one else was in the room  She acknowledged consent and understanding of privacy and security of the video platform  The patient has agreed to participate and understands they can discontinue the visit at any time  Patient is aware this is a billable service  Subjective  Jose Tee is a 46 y o  female with anxiety who is scheduled for a virtual visit for a med check  Pt reports she is doing well on sertraline 150mg daily  She does split up her doses and states she takes 50 mg in the AM, 25 mg in the afternoon and 75 mg at night   Denies any issues or side effects at this time  Reports she feels improved on increased dose  Past Medical History:   Diagnosis Date    Anxiety 2020    Patient denies medical problems        Past Surgical History:   Procedure Laterality Date     SECTION         Current Outpatient Medications   Medication Sig Dispense Refill    ALPRAZolam (XANAX) 0 25 mg tablet Take 1 tablet (0 25 mg total) by mouth daily at bedtime as needed for anxiety 30 tablet 0    VAHE FE 1  1 5-30 MG-MCG tablet   0    multivitamin (THERAGRAN) TABS Take by mouth      sertraline (ZOLOFT) 100 mg tablet Take 1 tablet (100 mg total) by mouth daily 90 tablet 3    sertraline (ZOLOFT) 50 mg tablet Take 1 tablet (50 mg total) by mouth daily 90 tablet 0    Biotin w/ Vitamins C & E (HAIR/SKIN/NAILS PO) Take by mouth daily (Patient not taking: Reported on 2021 )       No current facility-administered medications for this visit  No Known Allergies    Review of Systems   Constitutional: Negative for chills, fatigue and fever  Respiratory: Negative for cough, chest tightness and shortness of breath  Cardiovascular: Negative for chest pain  Neurological: Negative for headaches  Psychiatric/Behavioral: The patient is not nervous/anxious  Video Exam    Vitals:    22 0847   Weight: 82 1 kg (181 lb)   Height: 5' 4 5" (1 638 m)       Physical Exam  Vitals reviewed  Constitutional:       Appearance: Normal appearance  HENT:      Head: Normocephalic and atraumatic  Neurological:      Mental Status: She is alert and oriented to person, place, and time  Psychiatric:         Mood and Affect: Mood normal           I spent 15 minutes directly with the patient during this visit    VIRTUAL VISIT DISCLAIMER      uEladesmond Gutiérrezi verbally agrees to participate in [employer]   Pt is aware that [employer] could be limited without vital signs or the ability to perform a full hands-on physical Dinh Bill understands she or the provider may request at any time to terminate the video visit and request the patient to seek care or treatment in person

## 2022-01-20 ENCOUNTER — TELEMEDICINE (OUTPATIENT)
Dept: BEHAVIORAL/MENTAL HEALTH CLINIC | Facility: CLINIC | Age: 42
End: 2022-01-20
Payer: COMMERCIAL

## 2022-01-20 DIAGNOSIS — F41.9 ANXIETY: Primary | ICD-10-CM

## 2022-01-20 PROCEDURE — 90834 PSYTX W PT 45 MINUTES: CPT | Performed by: SOCIAL WORKER

## 2022-01-20 NOTE — ASSESSMENT & PLAN NOTE
Pt has been taking 150 mg daily and doing very well overall  Encourage her to continue counseling as scheduled  Stable, no changes today

## 2022-01-24 NOTE — PATIENT INSTRUCTIONS
Please continue to schedule a follow-up session within one week  In case of a psychiatric emergency please contact any of the following:  [facility] [phone] or [phone]     [place] Suicide Prevention Lifeline : 4-627.918.8837

## 2022-01-24 NOTE — PSYCH
This note was not shared with the patient due to this is a psychotherapy note     Virtual Regular Visit    Verification of patient location:    Patient is located in the following state in which I hold an active license [place]      Assessment/Plan:    Problem List Items Addressed This Visit        Other    Anxiety - Primary               Reason for visit is No chief complaint on file  Encounter provider Susana Noel LCSW    Provider located at [address] [address]  [phone]      Recent Visits  Date Type Provider Dept   01/20/22 [facility] [address] [facility]   01/19/22 Telemedicine Renetta Bui [address] [employer] [facility]   Showing recent visits within past 7 days and meeting all other requirements  Future Appointments  No visits were found meeting these conditions  Showing future appointments within next 150 days and meeting all other requirements       The patient was identified by name and date of birth  Jerilyn Cervantes was informed that this is a telemedicine visit and that the visit is being conducted through Telephone  My office door was closed  No one else was in the room  She acknowledged consent and understanding of privacy and security of the video platform  The patient has agreed to participate and understands they can discontinue the visit at any time  Patient is aware this is a billable service  Subjective  Jerilyn Cervantes is a 46 y o  female who presented for a follow-up virtual individual counseling session  At Humphrey's request, today's session was conducted as a virtual phone session in order to comply with social distancing secondary to the coronavirus pandemic    It was the undersigned therapist's intent to complete a video visit but Parker Hennessy was unable to make video connection so we defaulted to the phone  During today's session Jaswinder Viveros shared her history of work with her partner Akin  She reports she's been doing well at work and feels she can be productive at work but feels she can't be productive at home  We discussed scheduling and planning and routine can be helpful for suspected ADHD  Jaswinder Viveros discussed her relationship with her mom for a brief moment and reports her mother emailed her  The undersigned therapist assisted Jaswinder Viveros process her feelings about this e-mail  Jaswinder Viveros shared that her brother was "the perez child," but Jaswinder Viveros does not marilin much about her relationship with her mother  Jaswinder Viveros was upset that her uncle is sick and feels she's losing someone again in her family  Jaswinder Viveros reports the medication has helped and feels it takes "off the edge" of things  The undersigned therapist encouraged she continue taking it  Jaswinder Viveros is attempting 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, and reports she's been trying to calm herself and relax herself on a daily basis instead of overwhelming herself  Jaswinder Viveros denies suicidal intent, gesture or ideation at this time  The undersigned therapist provided Jaswinder Viveros with 45 minutes of psychotherapy           Past Medical History:   Diagnosis Date    Anxiety 2020    Patient denies medical problems        Past Surgical History:   Procedure Laterality Date     SECTION         Current Outpatient Medications   Medication Sig Dispense Refill    ALPRAZolam (XANAX) 0 25 mg tablet Take 1 tablet (0 25 mg total) by mouth daily at bedtime as needed for anxiety 30 tablet 0    Biotin w/ Vitamins C & E (HAIR/SKIN/NAILS PO) Take by mouth daily (Patient not taking: Reported on 2021 )      VAHE FE 1  1 5-30 MG-MCG tablet   0    multivitamin (THERAGRAN) TABS Take by mouth      sertraline (ZOLOFT) 100 mg tablet Take 1 tablet (100 mg total) by mouth daily 90 tablet 3    sertraline (ZOLOFT) 50 mg tablet Take 1 tablet (50 mg total) by mouth daily 90 tablet 0     No current facility-administered medications for this visit  No Known Allergies    Review of Systems   Psychiatric/Behavioral: The patient is nervous/anxious and is hyperactive  Video Exam    There were no vitals filed for this visit  Physical Exam  Psychiatric:         Attention and Perception: Attention and perception normal          Mood and Affect: Affect normal  Mood is anxious  Speech: Speech normal          Behavior: Behavior is hyperactive  Behavior is cooperative  Thought Content: Thought content normal          Cognition and Memory: Cognition and memory normal          Judgment: Judgment normal           I spent 45 minutes directly with the patient during this visit    VIRTUAL VISIT DISCLAIMER      [place] Jason verbally agrees to participate in [employer]  Pt is aware that [employer] could be limited without vital signs or the ability to perform a full hands-on physical Melinda Sheehan understands she or the provider may request at any time to terminate the video visit and request the patient to seek care or treatment in person

## 2022-01-27 ENCOUNTER — TELEMEDICINE (OUTPATIENT)
Dept: BEHAVIORAL/MENTAL HEALTH CLINIC | Facility: CLINIC | Age: 42
End: 2022-01-27
Payer: COMMERCIAL

## 2022-01-27 DIAGNOSIS — F41.9 ANXIETY: Primary | ICD-10-CM

## 2022-01-27 PROCEDURE — 90834 PSYTX W PT 45 MINUTES: CPT | Performed by: SOCIAL WORKER

## 2022-01-31 ENCOUNTER — TELEPHONE (OUTPATIENT)
Dept: FAMILY MEDICINE CLINIC | Facility: CLINIC | Age: 42
End: 2022-01-31

## 2022-01-31 DIAGNOSIS — E78.2 MIXED HYPERLIPIDEMIA: Primary | ICD-10-CM

## 2022-01-31 DIAGNOSIS — F41.9 ANXIETY: ICD-10-CM

## 2022-01-31 DIAGNOSIS — F43.21 GRIEF REACTION: ICD-10-CM

## 2022-02-02 NOTE — PSYCH
This note was not shared with the patient due to this is a psychotherapy note     Virtual Regular Visit    Verification of patient location:    Patient is located in the following state in which I hold an active license [place]      Assessment/Plan:    Problem List Items Addressed This Visit        Other    Anxiety - Primary               Reason for visit is No chief complaint on file  Encounter provider Meredith Ashton LCSW    Provider located at [address] [id number] [phone]      Recent Visits  Date Type Provider Dept   01/27/22 [address] 65, [facility] Physicians   Showing recent visits within past 7 days and meeting all other requirements  Future Appointments  No visits were found meeting these conditions  Showing future appointments within next 150 days and meeting all other requirements       The patient was identified by name and date of birth  Faith Sterling was informed that this is a telemedicine visit and that the visit is being conducted through Telephone  My office door was closed  No one else was in the room  She acknowledged consent and understanding of privacy and security of the video platform  The patient has agreed to participate and understands they can discontinue the visit at any time  Patient is aware this is a billable service  Subjective  Faith Sterling is a 46 y o  female who presented for a follow-up virtual individual counseling session  At [employer]'s request, today's session was conducted as a virtual phone session in order to comply with social distancing secondary to the coronavirus pandemic  During today's session Jim Meléndez was able to process her feelings about yoga, art and 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  Jim Meléndez has been feeling overwhelmed and wanting to quit her job but feels guilty quitting   The undersigned therapist assisted Sheri Ayers process her feelings and remind her that she is the one in charge of her choices  Sheri Ayers reports she's going to talk to the gym director this week as she feels she's been stretched thin and it's caused her to feel overwhelmed  Sheri Ayers shared that in her past she had a miscarriage between her two girls and was happy Abe Chick was born  However, Sheri Ayers continues to feel time and finances are a stressor for her  Sheri Ayers will feel guilt about both topics  Sheri Ayers reports she was over stimulated this week and felt anxious, irritable and overwhelmed  The undersigned therapist assisted Sheri Ayers process this and remind her when she feels triggered to disconnect and not overwhelm herself with all the chores she is "expected" to do  Sheri Ayers also expressed frustration that her children are not motivated by money to complete chores  The undersigned therapist suggested utilizing tv time, electronics or anything they do enjoy as the reward  Sheri Ayers reports she has an appointment with her orthopedist due to her back pain  Sheri Ayers denies suicidal intent, gesture or ideation at this time  The undersigned therapist provided Sheri Ayers with 45 minutes of psychotherapy           Past Medical History:   Diagnosis Date    Anxiety 2020    Patient denies medical problems        Past Surgical History:   Procedure Laterality Date     SECTION         Current Outpatient Medications   Medication Sig Dispense Refill    ALPRAZolam (XANAX) 0 25 mg tablet Take 1 tablet (0 25 mg total) by mouth daily at bedtime as needed for anxiety 30 tablet 0    Biotin w/ Vitamins C & E (HAIR/SKIN/NAILS PO) Take by mouth daily (Patient not taking: Reported on 2021 )      VAHE FE 1  1 5-30 MG-MCG tablet   0    multivitamin (THERAGRAN) TABS Take by mouth      sertraline (ZOLOFT) 100 mg tablet Take 1 tablet (100 mg total) by mouth daily 90 tablet 3    sertraline (ZOLOFT) 50 mg tablet Take 1 tablet (50 mg total) by mouth daily 90 tablet 0     No current facility-administered medications for this visit  No Known Allergies    Review of Systems   Psychiatric/Behavioral: The patient is nervous/anxious and is hyperactive  Video Exam    There were no vitals filed for this visit  I spent 45 minutes directly with the patient during this visit    VIRTUAL VISIT DISCLAIMER      Ana Franco verbally agrees to participate in [employer]  Pt is aware that [employer] could be limited without vital signs or the ability to perform a full hands-on physical Law Joe understands she or the provider may request at any time to terminate the video visit and request the patient to seek care or treatment in person

## 2022-02-02 NOTE — PATIENT INSTRUCTIONS
Please continue to schedule a follow-up session within one week  In case of a psychiatric emergency please contact any of the following:  [facility] [phone] or [phone]     National Suicide Prevention Lifeline : 4-793.870.5447

## 2022-02-04 ENCOUNTER — TELEMEDICINE (OUTPATIENT)
Dept: BEHAVIORAL/MENTAL HEALTH CLINIC | Facility: CLINIC | Age: 42
End: 2022-02-04
Payer: COMMERCIAL

## 2022-02-04 DIAGNOSIS — F41.9 ANXIETY: Primary | ICD-10-CM

## 2022-02-04 PROCEDURE — 90834 PSYTX W PT 45 MINUTES: CPT | Performed by: SOCIAL WORKER

## 2022-02-15 NOTE — PSYCH
This note was not shared with the patient due to this is a psychotherapy note       Virtual Regular Visit    Verification of patient location:    Patient is located in the following state in which I hold an active license [place]      Assessment/Plan:    Problem List Items Addressed This Visit        Other    Anxiety - Primary               Reason for visit is No chief complaint on file  Encounter provider Abbi Arriaga LCSW    Provider located at [address]  [address] [phone]      Recent Visits  No visits were found meeting these conditions  Showing recent visits within past 7 days and meeting all other requirements  Future Appointments  No visits were found meeting these conditions  Showing future appointments within next 150 days and meeting all other requirements       The patient was identified by name and date of birth  Veronica Everett was informed that this is a telemedicine visit and that the visit is being conducted through Telephone  My office door was closed  No one else was in the room  She acknowledged consent and understanding of privacy and security of the video platform  The patient has agreed to participate and understands they can discontinue the visit at any time  Patient is aware this is a billable service  Subjective  Veronica Everett is a 46 y o  female who presented for a follow-up virtual individual counseling session  At [employer]'s request, today's session was conducted as a virtual phone session in order to comply with social distancing secondary to the coronavirus pandemic  During today's session Man Zamudio processed her unpleasant experience with the orthopedist   Man Zamudio expressed frustration that she wasn't seen, no x-rays done, and then she was given the diagnosis of degenerative disc disease    Man Zamudio reports she has herniated discs and finally decided to quit the gym  Alina Petersen has been feeling better emotionally about quitting the gym but processed her feelings of embarrassment when she's at the gym as [employer] doesn't want people to think she was not medically ill  The undersigned therapist continues to assist Alina Petersen challenge her own thoughts with diacetal based thinking  Others don't need to know her reasons for not working  Alina Petersen expressed that she tried CBD oil yesterday and has also been recommended to receive physical therapy  Alina Petersen is happy she is stating to work on herself  Alina Petersen expressed her feelings about her mother and reports her mother mailed her 6 boxes of childhood memories  The undersigned therapist assisted Alina Petersen process her feelings about her mother and her thoughts about the relationship  Alina Petersen states, "I feel my mother wasn't happy as a mother  And I had a pretty sad childhood "  Alina Petersen expressed sadness that her father passed and joanne's a piece missing of the information that her mother has told her  Alina Petersen was able to identify finances/money being a trigger for stress  Alina Petersen continues to take her medication as prescribed and reports a noticeable decrease in depression/anxiety but continues to struggle finishing tasks, organizing and completing goal-oriented tasks  Alina Petersen denies suicidal intent, gesture or ideation at this time  The undersigned therapist provided Alina Petersen with 45 minutes of virtual psychotherapy           Past Medical History:   Diagnosis Date    Anxiety 2020    Patient denies medical problems        Past Surgical History:   Procedure Laterality Date     SECTION         Current Outpatient Medications   Medication Sig Dispense Refill    ALPRAZolam (XANAX) 0 25 mg tablet Take 1 tablet (0 25 mg total) by mouth daily at bedtime as needed for anxiety 30 tablet 0    Biotin w/ Vitamins C & E (HAIR/SKIN/NAILS PO) Take by mouth daily (Patient not taking: Reported on 2021 )  VAHE FE 1 5/30 1 5-30 MG-MCG tablet   0    multivitamin (THERAGRAN) TABS Take by mouth      sertraline (ZOLOFT) 100 mg tablet Take 1 tablet (100 mg total) by mouth daily 90 tablet 3    sertraline (ZOLOFT) 50 mg tablet Take 1 tablet (50 mg total) by mouth daily 90 tablet 0     No current facility-administered medications for this visit  No Known Allergies    Review of Systems   Psychiatric/Behavioral: Positive for decreased concentration  The patient is nervous/anxious and is hyperactive  Video Exam    There were no vitals filed for this visit  Physical Exam  Psychiatric:         Attention and Perception: Attention and perception normal          Mood and Affect: Mood normal  Affect is tearful  Speech: Speech normal          Behavior: Behavior normal          Thought Content: Thought content normal          Cognition and Memory: Cognition and memory normal          Judgment: Judgment normal           I spent 45 minutes directly with the patient during this visit    VIRTUAL VISIT DISCLAIMER      Naa Ogden verbally agrees to participate in [employer]  Pt is aware that [employer] could be limited without vital signs or the ability to perform a full hands-on physical Jackie Harman understands she or the provider may request at any time to terminate the video visit and request the patient to seek care or treatment in person

## 2022-02-18 ENCOUNTER — TELEMEDICINE (OUTPATIENT)
Dept: BEHAVIORAL/MENTAL HEALTH CLINIC | Facility: CLINIC | Age: 42
End: 2022-02-18
Payer: COMMERCIAL

## 2022-02-18 DIAGNOSIS — F41.9 ANXIETY: Primary | ICD-10-CM

## 2022-02-18 PROCEDURE — 90834 PSYTX W PT 45 MINUTES: CPT | Performed by: SOCIAL WORKER

## 2022-02-28 NOTE — PSYCH
This note was not shared with the patient due to this is a psychotherapy note     Virtual Regular Visit    Verification of patient location:    Patient is located in the following state in which I hold an active license [place]      Assessment/Plan:    Problem List Items Addressed This Visit        Other    Anxiety - Primary               Reason for visit is No chief complaint on file  Encounter provider Abbi Arriaga LCSW    Provider located at [address]  [address] [phone]      Recent Visits  No visits were found meeting these conditions  Showing recent visits within past 7 days and meeting all other requirements  Future Appointments  No visits were found meeting these conditions  Showing future appointments within next 150 days and meeting all other requirements       The patient was identified by name and date of birth  Veronica Everett was informed that this is a telemedicine visit and that the visit is being conducted through [facility] and patient was informed this is a secure, HIPAA-complaint platform  She agrees to proceed     My office door was closed  No one else was in the room  She acknowledged consent and understanding of privacy and security of the video platform  The patient has agreed to participate and understands they can discontinue the visit at any time  Patient is aware this is a billable service  Subjective  Veronica Everett is a 46 y o  female who presented for a follow-up virtual individual counseling session  At [employer]'s request, today's session was conducted as a virtual phone session in order to comply with social distancing secondary to the coronavirus pandemic  During today's session the undersigned therapist assisted Eribertorobin Zamudio process her feelings about her current physical ailments    Man Zamudio reports she's been going to physical therapy due to her back pain, but feels that the pain has increased since she got a final diagnosis as degenerative discs  Fabien Cartwright reports her mood has been fluctuating for the last two weeks and feels it's been related to her pain  In addition, Fabien Cartwright continues to feel anxious and overwhelmed  Fabien Cartwright disclosed the possibility of her father having an opiate use disorder and Fabien Cartwright sharing her own feelings about medication for her back pain  Fabien Cartwright reports she's always been cautious of substances 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, but also because she saw what drugs did to her father and how sick he was physically  Fabien Cartwright continues to struggle with ADHD like symptoms and feeling overwhelmed when there is clutter or normalization in her home  Due to the unorganized home Fabien Cartwright had "exploded" on her  and reports he helped her clean and she admitted needing to communicate her stress more  Fabien Cartwright was processing her feelings around her children not helping in the home and we discussed reward system strategies and how some children do better with positive reinforcement  Fabien Cartwright shared that her daughter was getting bullied in school and Fabien Cartwright processed her feelings of frustration with how the school is handling it  Fabien Cartwright continues to feel anxious, but reports the medication has helped tremendously  Fabien Cartwright denies suicidal intent, gesture or ideation at this time              HPI     Past Medical History:   Diagnosis Date    Anxiety 2020    Patient denies medical problems        Past Surgical History:   Procedure Laterality Date     SECTION         Current Outpatient Medications   Medication Sig Dispense Refill    ALPRAZolam (XANAX) 0 25 mg tablet Take 1 tablet (0 25 mg total) by mouth daily at bedtime as needed for anxiety 30 tablet 0    Biotin w/ Vitamins C & E (HAIR/SKIN/NAILS PO) Take by mouth daily (Patient not taking: Reported on 2021 )     Sukhdev SMITH 1  1 5-30 MG-MCG tablet   0    multivitamin (THERAGRAN) TABS Take by mouth      sertraline (ZOLOFT) 100 mg tablet Take 1 tablet (100 mg total) by mouth daily 90 tablet 3    sertraline (ZOLOFT) 50 mg tablet Take 1 tablet (50 mg total) by mouth daily 90 tablet 0     No current facility-administered medications for this visit  No Known Allergies    Review of Systems   Psychiatric/Behavioral: Positive for decreased concentration, dysphoric mood and sleep disturbance  The patient is nervous/anxious  Video Exam    There were no vitals filed for this visit  Physical Exam  Psychiatric:         Attention and Perception: Attention and perception normal          Mood and Affect: Affect normal  Mood is anxious  Speech: Speech normal          Behavior: Behavior normal  Behavior is cooperative  Thought Content: Thought content normal          Cognition and Memory: Cognition and memory normal          Judgment: Judgment normal           I spent 45 minutes directly with the patient during this visit    VIRTUAL VISIT DISCLAIMER      Juan Pablo Gramajo verbally agrees to participate in [employer]  Pt is aware that [employer] could be limited without vital signs or the ability to perform a full hands-on physical Hui Wills understands she or the provider may request at any time to terminate the video visit and request the patient to seek care or treatment in person

## 2022-02-28 NOTE — PATIENT INSTRUCTIONS
Please continue to schedule a follow-up session within one week  In case of a psychiatric emergency please contact any of the following:  [facility] \[facility]\"" [phone] or [phone]     National Suicide Prevention Lifeline : 0-826.152.6552

## 2022-03-04 ENCOUNTER — TELEMEDICINE (OUTPATIENT)
Dept: BEHAVIORAL/MENTAL HEALTH CLINIC | Facility: CLINIC | Age: 42
End: 2022-03-04
Payer: COMMERCIAL

## 2022-03-04 DIAGNOSIS — F43.21 ADJUSTMENT DISORDER WITH DEPRESSED MOOD: ICD-10-CM

## 2022-03-04 DIAGNOSIS — F41.9 ANXIETY: Primary | ICD-10-CM

## 2022-03-04 PROCEDURE — 90834 PSYTX W PT 45 MINUTES: CPT | Performed by: SOCIAL WORKER

## 2022-03-10 ENCOUNTER — TELEPHONE (OUTPATIENT)
Dept: FAMILY MEDICINE CLINIC | Facility: CLINIC | Age: 42
End: 2022-03-10

## 2022-03-10 ENCOUNTER — TELEMEDICINE (OUTPATIENT)
Dept: FAMILY MEDICINE CLINIC | Facility: CLINIC | Age: 42
End: 2022-03-10
Payer: COMMERCIAL

## 2022-03-10 VITALS — BODY MASS INDEX: 30.16 KG/M2 | HEIGHT: 65 IN | WEIGHT: 181 LBS | TEMPERATURE: 98 F

## 2022-03-10 DIAGNOSIS — F43.21 GRIEF REACTION: ICD-10-CM

## 2022-03-10 DIAGNOSIS — F90.1 ATTENTION DEFICIT HYPERACTIVITY DISORDER (ADHD), PREDOMINANTLY HYPERACTIVE TYPE: ICD-10-CM

## 2022-03-10 DIAGNOSIS — M51.36 LUMBAR DEGENERATIVE DISC DISEASE: ICD-10-CM

## 2022-03-10 DIAGNOSIS — F41.9 ANXIETY: Primary | ICD-10-CM

## 2022-03-10 PROBLEM — M51.369 LUMBAR DEGENERATIVE DISC DISEASE: Status: ACTIVE | Noted: 2022-03-10

## 2022-03-10 PROCEDURE — 99214 OFFICE O/P EST MOD 30 MIN: CPT | Performed by: NURSE PRACTITIONER

## 2022-03-10 PROCEDURE — 3725F SCREEN DEPRESSION PERFORMED: CPT | Performed by: NURSE PRACTITIONER

## 2022-03-10 RX ORDER — BUPROPION HYDROCHLORIDE 150 MG/1
150 TABLET ORAL EVERY MORNING
Qty: 90 TABLET | Refills: 3 | Status: SHIPPED | OUTPATIENT
Start: 2022-03-10 | End: 2022-03-28

## 2022-03-10 RX ORDER — SENNOSIDES 8.6 MG
650 CAPSULE ORAL EVERY 8 HOURS PRN
COMMUNITY
End: 2022-03-21

## 2022-03-10 RX ORDER — SERTRALINE HYDROCHLORIDE 25 MG/1
25 TABLET, FILM COATED ORAL DAILY
Qty: 7 TABLET | Refills: 0 | Status: SHIPPED | OUTPATIENT
Start: 2022-03-10 | End: 2022-05-04 | Stop reason: SDUPTHER

## 2022-03-10 NOTE — TELEPHONE ENCOUNTER
Return in about 4 weeks (around 4/7/2022) for Recheck  Check out comments: Please schedule 4 week recheck  Can you also request her spinal MRI from [facility]? Curly Bolanos and she will call us back to schedule an appt in 4 weeks  She was driving        Called [facility] and they will fax the MRI of spinal

## 2022-03-10 NOTE — ASSESSMENT & PLAN NOTE
Pt is following up with pain management through [facility], Dr Cooper Botello  Recent MRI - will request those results for our records  PT worsening patient's symptoms  Advise follow up with pain management as scheduled

## 2022-03-10 NOTE — ASSESSMENT & PLAN NOTE
Pt refusing adderall at this time  Discussed indications for use  Will trial off of sertraline as this is causing fatigue and brain fog and she continues to experience periodic anxiety  Recommend switch to Wellbutrin and monitor for improvement  Discussed taper of sertraline: 100 mg x's 5 days, 50 mg x's 7, 25 mg x's 7 days  When you start 25 mg daily, you may also start the Wellbutrin  Recheck in 4 weeks

## 2022-03-10 NOTE — PROGRESS NOTES
Virtual Regular Visit    Verification of patient location:    Patient is located in the following state in which I hold an active license [place]      Assessment/Plan:    Problem List Items Addressed This Visit        Musculoskeletal and Integument    Lumbar degenerative disc disease     Pt is following up with pain management through [facility], Dr Miah Pate  Recent MRI - will request those results for our records  PT worsening patient's symptoms  Advise follow up with pain management as scheduled  Other    Anxiety - Primary    Relevant Medications    sertraline (Zoloft) 25 mg tablet    buPROPion (Wellbutrin XL) 150 mg 24 hr tablet    Attention deficit hyperactivity disorder (ADHD), predominantly hyperactive type     Pt refusing adderall at this time  Discussed indications for use  Will trial off of sertraline as this is causing fatigue and brain fog and she continues to experience periodic anxiety  Recommend switch to Wellbutrin and monitor for improvement  Discussed taper of sertraline: 100 mg x's 5 days, 50 mg x's 7, 25 mg x's 7 days  When you start 25 mg daily, you may also start the Wellbutrin  Recheck in 4 weeks  Relevant Medications    sertraline (Zoloft) 25 mg tablet    buPROPion (Wellbutrin XL) 150 mg 24 hr tablet      Other Visit Diagnoses     Grief reaction        Relevant Medications    sertraline (Zoloft) 25 mg tablet    buPROPion (Wellbutrin XL) 150 mg 24 hr tablet               Reason for visit is   Chief Complaint   Patient presents with    MED CHECK    LEFT SIDE PAIN     BACK AND GOING  DOWN LEG     Virtual Regular Visit        Encounter provider Kyara Engle, [address] [place] [address]    Provider located at [address] [place] [address]  [id number]      Recent Visits  No visits were found meeting these conditions    Showing recent visits within past 7 days and meeting all other requirements  Today's Visits  Date Type Provider Dept 03/10/22 [facility]   Showing today's visits and meeting all other requirements  Future Appointments  No visits were found meeting these conditions  Showing future appointments within next 150 days and meeting all other requirements       The patient was identified by name and date of birth  Kianna Rowe was informed that this is a telemedicine visit and that the visit is being conducted through [address] [facility] and patient was informed that this is a secure, HIPAA-compliant platform  She agrees to proceed     My office door was closed  No one else was in the room  She acknowledged consent and understanding of privacy and security of the video platform  The patient has agreed to participate and understands they can discontinue the visit at any time  Patient is aware this is a billable service  Subjective  Kianna Rowe is a 46 y o  female who is scheduled for a virtual follow up of anxiety  Reports she has been seeing our in office counselor Yunior Huber and they have been having ongoing discussion of ADHD  Pt repots that she did struggle in school and almost did not graduate due to a great deal of her work not being completed  She does report difficulty with task completion and focus  Reports she has been doing OK on sertraline but is now starting to experience some brain fog  Reports feeling extremely fatigued in the afternoons and reports, "I cannot be tired because I have to function"  Reports severe lower back pain that was noted to have DDD, recent MRI which she is planning to discuss with pain management early next   Reports "I am walking with a cane now "      Past Medical History:   Diagnosis Date    Anxiety 12/8/2020    Attention deficit hyperactivity disorder (ADHD), predominantly hyperactive type 3/10/2022    Lumbar degenerative disc disease 3/10/2022    Patient denies medical problems        Past Surgical History:   Procedure Laterality Date   SECTION         Current Outpatient Medications   Medication Sig Dispense Refill    acetaminophen (TYLENOL) 650 mg CR tablet Take 650 mg by mouth every 8 (eight) hours as needed for mild pain      ALPRAZolam (XANAX) 0 25 mg tablet Take 1 tablet (0 25 mg total) by mouth daily at bedtime as needed for anxiety 30 tablet 0    VAHE FE 1  1 5-30 MG-MCG tablet   0    Prenatal MV & Min w/FA-DHA (PRENATAL ADULT GUMMY/DHA/FA PO) Take by mouth      Biotin w/ Vitamins C & E (HAIR/SKIN/NAILS PO) Take by mouth daily (Patient not taking: Reported on 2021 )      buPROPion (Wellbutrin XL) 150 mg 24 hr tablet Take 1 tablet (150 mg total) by mouth every morning 90 tablet 3    sertraline (Zoloft) 25 mg tablet Take 1 tablet (25 mg total) by mouth daily 7 tablet 0     No current facility-administered medications for this visit  No Known Allergies    Review of Systems   Psychiatric/Behavioral: Positive for decreased concentration  Negative for self-injury, sleep disturbance and suicidal ideas  The patient is nervous/anxious  Video Exam    Vitals:    03/10/22 1019   Temp: 98 °F (36 7 °C)   TempSrc: Temporal   Weight: 82 1 kg (181 lb)   Height: 5' 4 5" (1 638 m)       Physical Exam  Vitals reviewed  Constitutional:       Appearance: Normal appearance  HENT:      Head: Normocephalic and atraumatic  Neurological:      Mental Status: She is alert and oriented to person, place, and time  Psychiatric:         Mood and Affect: Mood normal          Speech: Speech normal          Behavior: Behavior is hyperactive  Thought Content: Thought content normal           I spent 20 minutes directly with the patient during this visit    VIRTUAL VISIT DISCLAIMER      Ted Stoner verbally agrees to participate in [employer]   Pt is aware that [employer] could be limited without vital signs or the ability to perform a full hands-on physical Arekolby Aguilera understands she or the provider may request at any time to terminate the video visit and request the patient to seek care or treatment in person

## 2022-03-10 NOTE — PATIENT INSTRUCTIONS
100 mg x's 5 days, 50 mg x's 7, 25 mg x's 7 days  When you start 25 mg daily, you may also start the Wellbutrin

## 2022-03-15 NOTE — PSYCH
This note was not shared with the patient due to this is a psychotherapy note   Virtual Regular Visit    Verification of patient location:    Patient is located in the following state in which I hold an active license [place]      Assessment/Plan:    Problem List Items Addressed This Visit        Other    Anxiety - Primary      Other Visit Diagnoses     Adjustment disorder with depressed mood                   Reason for visit is No chief complaint on file  Encounter provider Kendal Crowe LCSW    Provider located at [address] [address] [phone]      Recent Visits  Date Type Provider Dept   03/10/22 [address] [place] [address], [facility] [place] [facility] [address] [facility]   Showing recent visits within past 7 days and meeting all other requirements  Future Appointments  No visits were found meeting these conditions  Showing future appointments within next 150 days and meeting all other requirements       The patient was identified by name and date of birth  Elma Hanks was informed that this is a telemedicine visit and that the visit is being conducted through [facility] and patient was informed this is a secure, HIPAA-complaint platform  She agrees to proceed     My office door was closed  No one else was in the room  She acknowledged consent and understanding of privacy and security of the video platform  The patient has agreed to participate and understands they can discontinue the visit at any time  Patient is aware this is a billable service  Subjective  Elma Hanks is a 46 y o  female who presented for a follow-up virtual individual counseling session  At [facility]'s request, today's session was conducted as a virtual phone session in order to comply with social distancing secondary to the coronavirus pandemic    During today's session Naty Galindo expressed her current feelings about her back pain  Cierra Monzon has been feeling overwhelmed, stressed and sad that she is not able to function as she used to in the past  Cierra Monzon reports she bought a cane on Monday and disclosed to PT therapist that PT wasn't helpful  The PT therapist agreed and recommenced Cierra Monzon go back to the doctor and find other ways to address her pain  Cierra Monzon has been showing signs of feeling hopeless due to her pain, but the undersigned therapist processed her real fear of old age and her thoughts of "ending up like my father "  The undersigned therapist assisted Cierra Monzon process her feelings about her pain and combat irrational thoughts with rational ones  Cierra Monzon states someday's she feels better than others but is looking forward to her MRI results  Cierra Monzon continues to try CBD to help sleep and manage her pain but she reports it hasn't been helpful  The undersigned therapist encouraged Cierra Monzon talk to her PCP About medication, but Cierra Monzon appears ambivalent in taking medication for pain or sleep  Cierra Monzon continues to struggle with focus, concentration and staying on task- ADHD like symptoms  Towards the end of session Cierra Monzon shared that she went out with some friends this weekend, and she enjoyed herself  Cierra Monzon did express feeling "exhausted" after being there, but overall enjoyed herself  Cierra Monzon continues to express that her marriage is going well  Cierra Monzon denies suicidal intent, gesture or ideation at this time         The undersigned therapist provided Cierra Monzon with 45 minutes of psychotherapy           Past Medical History:   Diagnosis Date    Anxiety 2020    Attention deficit hyperactivity disorder (ADHD), predominantly hyperactive type 3/10/2022    Lumbar degenerative disc disease 3/10/2022    Patient denies medical problems        Past Surgical History:   Procedure Laterality Date     SECTION         Current Outpatient Medications   Medication Sig Dispense Refill    acetaminophen (TYLENOL) 650 mg CR tablet Take 650 mg by mouth every 8 (eight) hours as needed for mild pain      ALPRAZolam (XANAX) 0 25 mg tablet Take 1 tablet (0 25 mg total) by mouth daily at bedtime as needed for anxiety 30 tablet 0    Biotin w/ Vitamins C & E (HAIR/SKIN/NAILS PO) Take by mouth daily (Patient not taking: Reported on 12/30/2021 )      buPROPion (Wellbutrin XL) 150 mg 24 hr tablet Take 1 tablet (150 mg total) by mouth every morning 90 tablet 3    VAHE FE 1 5/30 1 5-30 MG-MCG tablet   0    Prenatal MV & Min w/FA-DHA (PRENATAL ADULT GUMMY/DHA/FA PO) Take by mouth      sertraline (Zoloft) 25 mg tablet Take 1 tablet (25 mg total) by mouth daily 7 tablet 0     No current facility-administered medications for this visit  No Known Allergies    Review of Systems   Psychiatric/Behavioral: Positive for decreased concentration, dysphoric mood and sleep disturbance  The patient is nervous/anxious  Video Exam    There were no vitals filed for this visit  Physical Exam  Psychiatric:         Attention and Perception: Attention and perception normal          Mood and Affect: Mood is anxious and depressed  Affect is tearful  Speech: Speech normal          Behavior: Behavior normal  Behavior is cooperative  Thought Content: Thought content normal          Cognition and Memory: Cognition and memory normal          Judgment: Judgment normal           I spent 45 minutes directly with the patient during this visit    VIRTUAL VISIT DISCLAIMER      Jordan Mims verbally agrees to participate in [employer]  Pt is aware that [employer] could be limited without vital signs or the ability to perform a full hands-on physical Salvcathleen Bruce understands she or the provider may request at any time to terminate the video visit and request the patient to seek care or treatment in person

## 2022-03-15 NOTE — PATIENT INSTRUCTIONS
Please continue to schedule a follow-up session within one week  In case of a psychiatric emergency please contact any of the following:  [facility] [phone] or [phone]     National Suicide Prevention Lifeline : 1-655.661.8028

## 2022-03-21 ENCOUNTER — TELEMEDICINE (OUTPATIENT)
Dept: FAMILY MEDICINE CLINIC | Facility: CLINIC | Age: 42
End: 2022-03-21
Payer: COMMERCIAL

## 2022-03-21 VITALS — BODY MASS INDEX: 30.16 KG/M2 | HEIGHT: 65 IN | WEIGHT: 181 LBS

## 2022-03-21 DIAGNOSIS — F90.1 ATTENTION DEFICIT HYPERACTIVITY DISORDER (ADHD), PREDOMINANTLY HYPERACTIVE TYPE: Primary | ICD-10-CM

## 2022-03-21 DIAGNOSIS — F41.9 ANXIETY: ICD-10-CM

## 2022-03-21 DIAGNOSIS — F45.41 STRESS HEADACHES: ICD-10-CM

## 2022-03-21 DIAGNOSIS — F41.0 PANIC ATTACK: ICD-10-CM

## 2022-03-21 PROBLEM — R10.32 LLQ ABDOMINAL PAIN: Status: RESOLVED | Noted: 2021-05-26 | Resolved: 2022-03-21

## 2022-03-21 PROCEDURE — 99214 OFFICE O/P EST MOD 30 MIN: CPT | Performed by: NURSE PRACTITIONER

## 2022-03-21 RX ORDER — DIAZEPAM 5 MG/1
TABLET ORAL
COMMUNITY
Start: 2022-03-14 | End: 2022-03-21 | Stop reason: ALTCHOICE

## 2022-03-21 RX ORDER — CELECOXIB 200 MG/1
CAPSULE ORAL
COMMUNITY
Start: 2022-03-14 | End: 2022-04-07 | Stop reason: HOSPADM

## 2022-03-21 NOTE — PROGRESS NOTES
Virtual Regular Visit    Verification of patient location:    Patient is located in the following state in which I hold an active license [place]      Assessment/Plan:    Problem List Items Addressed This Visit        Other    Anxiety     Pt symptoms have been exacerbated with tapering of sertraline  Pt is currently on 50 mg daily  Advise that she start Wellbutrin 150 mg for attention deficit, as was the original plan  Advise that she remain on sertraline 50 mg without any further dose reduction  Recheck one week  Stress headaches    Attention deficit hyperactivity disorder (ADHD), predominantly hyperactive type - Primary     Advise that patient start wellbutrin 150 mg at this time  Recheck in one week  Panic attack     Alprazolam for management  Reviewed indications for use  Continue counseling as scheduled  Recheck one week  BMI Counseling: Body mass index is 30 59 kg/m²  The BMI is above normal  Exercise recommendations include exercising 3-5 times per week  Rationale for BMI follow-up plan is due to patient being overweight or obese  Depression Screening and Follow-up Plan: Continue regular follow-up with their mental health provider who is managing their mental health condition(s)  Patient advised to follow-up with PCP for further management  Reason for visit is   Chief Complaint   Patient presents with    Medication Management    PAIN ATTACKS    Virtual Regular Visit        Encounter provider Sherrell Hernandez    Provider located at [address] [place] [address]  [phone]      Recent Visits  No visits were found meeting these conditions    Showing recent visits within past 7 days and meeting all other requirements  Today's Visits  Date Type Provider Dept   03/21/22 Telemedicine Katharine Hanson, [facility]   Showing today's visits and meeting all other requirements  Future Appointments  No visits were found meeting these conditions  Showing future appointments within next 150 days and meeting all other requirements       The patient was identified by name and date of birth  Bonnie Marcelino was informed that this is a telemedicine visit and that the visit is being conducted through [address] [place] [address] Road [facility] and patient was informed that this is a secure, HIPAA-compliant platform  She agrees to proceed     My office door was closed  No one else was in the room  She acknowledged consent and understanding of privacy and security of the video platform  The patient has agreed to participate and understands they can discontinue the visit at any time  Patient is aware this is a billable service  Subjective  Bonnie Marcelino is a 46 y o  female who has scheduled a virtual visit to discuss increased anxiety symptoms  Pt was advised to taper off of sertraline so that she may start Wellbutrin for both anxiety and attention deficit issues  Pt reports that since tapering her sertraline dose, she has been experiencing increased anxiety symptoms  Denies fever, chills, chest pain, shortness of breath, n/v/d  She does report having a panic attack after feeling "drugged up" from taking sertraline and Celebrex together  She has been having ongoing back pain that has been affecting her day to day activities  She is following up with pain management and recently had steroid injection for pain symptoms about 3 days ago without only mild improvement so far  Reports that her inability to do her usual activities has also been contributing to her anxiety           Past Medical History:   Diagnosis Date    Anxiety 2020    Attention deficit hyperactivity disorder (ADHD), predominantly hyperactive type 3/10/2022    Lumbar degenerative disc disease 3/10/2022    Patient denies medical problems        Past Surgical History:   Procedure Laterality Date     SECTION         Current Outpatient Medications   Medication Sig Dispense Refill    ALPRAZolam (XANAX) 0 25 mg tablet Take 1 tablet (0 25 mg total) by mouth daily at bedtime as needed for anxiety 30 tablet 0    celecoxib (CeleBREX) 200 mg capsule take 1 capsule by mouth every 12 hours if needed      VAHE FE 1 5/30 1 5-30 MG-MCG tablet   0    Prenatal MV & Min w/FA-DHA (PRENATAL ADULT GUMMY/DHA/FA PO) Take by mouth      sertraline (Zoloft) 25 mg tablet Take 1 tablet (25 mg total) by mouth daily 7 tablet 0    buPROPion (Wellbutrin XL) 150 mg 24 hr tablet Take 1 tablet (150 mg total) by mouth every morning (Patient not taking: Reported on 3/21/2022 ) 90 tablet 3     No current facility-administered medications for this visit  No Known Allergies    Review of Systems   Constitutional: Negative for chills, fatigue and fever  Respiratory: Negative for cough, chest tightness and shortness of breath  Cardiovascular: Negative for chest pain, palpitations and leg swelling  Neurological: Positive for headaches  Psychiatric/Behavioral: Positive for decreased concentration  Negative for sleep disturbance  The patient is nervous/anxious  Video Exam    Vitals:    03/21/22 1020   Weight: 82 1 kg (181 lb)   Height: 5' 4 5" (1 638 m)       Physical Exam  Vitals reviewed  Constitutional:       Appearance: Normal appearance  HENT:      Head: Normocephalic and atraumatic  Neurological:      Mental Status: She is alert and oriented to person, place, and time  Psychiatric:         Mood and Affect: Mood normal  Affect is tearful  I spent 20 minutes directly with the patient during this visit    VIRTUAL VISIT DISCLAIMER      Jose Tee verbally agrees to participate in [employer]   Pt is aware that [employer] could be limited without vital signs or the ability to perform a full hands-on physical Sacha Patel understands she or the provider may request at any time to terminate the video visit and request the patient to seek care or treatment in person

## 2022-03-21 NOTE — ASSESSMENT & PLAN NOTE
Alprazolam for management  Reviewed indications for use  Continue counseling as scheduled  Recheck one week

## 2022-03-21 NOTE — ASSESSMENT & PLAN NOTE
Pt symptoms have been exacerbated with tapering of sertraline  Pt is currently on 50 mg daily  Advise that she start Wellbutrin 150 mg for attention deficit, as was the original plan  Advise that she remain on sertraline 50 mg without any further dose reduction  Recheck one week

## 2022-03-23 ENCOUNTER — PATIENT MESSAGE (OUTPATIENT)
Dept: FAMILY MEDICINE CLINIC | Facility: CLINIC | Age: 42
End: 2022-03-23

## 2022-03-23 DIAGNOSIS — F90.1 ATTENTION DEFICIT HYPERACTIVITY DISORDER (ADHD), PREDOMINANTLY HYPERACTIVE TYPE: ICD-10-CM

## 2022-03-28 ENCOUNTER — TELEMEDICINE (OUTPATIENT)
Dept: FAMILY MEDICINE CLINIC | Facility: CLINIC | Age: 42
End: 2022-03-28
Payer: COMMERCIAL

## 2022-03-28 VITALS — BODY MASS INDEX: 30.16 KG/M2 | WEIGHT: 181 LBS | HEIGHT: 65 IN

## 2022-03-28 DIAGNOSIS — F41.9 ANXIETY: ICD-10-CM

## 2022-03-28 DIAGNOSIS — F90.1 ATTENTION DEFICIT HYPERACTIVITY DISORDER (ADHD), PREDOMINANTLY HYPERACTIVE TYPE: Primary | ICD-10-CM

## 2022-03-28 PROCEDURE — 3008F BODY MASS INDEX DOCD: CPT | Performed by: NURSE PRACTITIONER

## 2022-03-28 PROCEDURE — 99214 OFFICE O/P EST MOD 30 MIN: CPT | Performed by: NURSE PRACTITIONER

## 2022-03-28 RX ORDER — DEXTROAMPHETAMINE SACCHARATE, AMPHETAMINE ASPARTATE, DEXTROAMPHETAMINE SULFATE AND AMPHETAMINE SULFATE 1.25; 1.25; 1.25; 1.25 MG/1; MG/1; MG/1; MG/1
5 TABLET ORAL 2 TIMES DAILY
Qty: 10 TABLET | Refills: 0 | Status: SHIPPED | OUTPATIENT
Start: 2022-03-28 | End: 2022-03-30 | Stop reason: SDUPTHER

## 2022-03-28 NOTE — ASSESSMENT & PLAN NOTE
Pt has been instructed to DC wellbutrin due to s/e's  Continue sertraline 100 mg daily  Pt reports she is doing well overall

## 2022-03-28 NOTE — PROGRESS NOTES
Virtual Regular Visit    Verification of patient location:    Patient is located in the following state in which I hold an active license [place]      Assessment/Plan:    Problem List Items Addressed This Visit        Other    Anxiety     Pt has been instructed to DC wellbutrin due to s/e's  Continue sertraline 100 mg daily  Pt reports she is doing well overall  Attention deficit hyperactivity disorder (ADHD), predominantly hyperactive type - Primary    Relevant Medications    amphetamine-dextroamphetamine (ADDERALL, 5MG,) 5 MG tablet               Reason for visit is   Chief Complaint   Patient presents with    Medication Management    Virtual Regular Visit        Encounter provider Chra Red [facility]    Provider located at [address] [place] [phone]      Recent Visits  Date Type Provider Dept   03/21/22 [address], [facility]   Showing recent visits within past 7 days and meeting all other requirements  Today's Visits  Date Type Provider Dept   03/28/22 [address] [place], [place] [address], [facility]   Showing today's visits and meeting all other requirements  Future Appointments  No visits were found meeting these conditions  Showing future appointments within next 150 days and meeting all other requirements       The patient was identified by name and date of birth  Emir Clarke was informed that this is a telemedicine visit and that the visit is being conducted through [address] [place] [address] Now and patient was informed that this is a secure, HIPAA-compliant platform  She agrees to proceed     My office door was closed  No one else was in the room  She acknowledged consent and understanding of privacy and security of the video platform  The patient has agreed to participate and understands they can discontinue the visit at any time      Patient is aware this is a billable service  Daniel Reyes is a 46year old female who anxiety and ADHD who is scheduled for a virtual recheck of ADHD  Pt reports that she had to stop her Wellbutrin due to palpations and anxiety associated  Currently, she denies chest pain, shortness of breath  Reports taht she continues to struggle with task completion and is made worse when her son is home from school  Past Medical History:   Diagnosis Date    Anxiety 2020    Attention deficit hyperactivity disorder (ADHD), predominantly hyperactive type 3/10/2022    Lumbar degenerative disc disease 3/10/2022    Patient denies medical problems        Past Surgical History:   Procedure Laterality Date     SECTION         Current Outpatient Medications   Medication Sig Dispense Refill    ALPRAZolam (XANAX) 0 25 mg tablet Take 1 tablet (0 25 mg total) by mouth daily at bedtime as needed for anxiety 30 tablet 0    celecoxib (CeleBREX) 200 mg capsule take 1 capsule by mouth every 12 hours if needed      VAHE FE 1  1 5-30 MG-MCG tablet   0    Prenatal MV & Min w/FA-DHA (PRENATAL ADULT GUMMY/DHA/FA PO) Take by mouth      sertraline (Zoloft) 25 mg tablet Take 1 tablet (25 mg total) by mouth daily (Patient taking differently: Take 100 mg by mouth daily  ) 7 tablet 0    amphetamine-dextroamphetamine (ADDERALL, 5MG,) 5 MG tablet Take 1 tablet (5 mg total) by mouth 2 (two) times a day Max Daily Amount: 10 mg 10 tablet 0     No current facility-administered medications for this visit  No Known Allergies    Review of Systems   Constitutional: Negative for chills, fatigue and fever  Respiratory: Negative for cough, chest tightness and shortness of breath  Psychiatric/Behavioral: Negative for decreased concentration and sleep disturbance  The patient is not nervous/anxious  Video Exam    Vitals:    22 0823   Weight: 82 1 kg (181 lb)   Height: 5' 4 5" (1 638 m)       Physical Exam  Vitals reviewed  Constitutional:       Appearance: Normal appearance  HENT:      Head: Normocephalic and atraumatic  Neurological:      Mental Status: She is alert and oriented to person, place, and time  Psychiatric:         Mood and Affect: Mood normal           I spent 15 minutes directly with the patient during this visit    VIRTUAL VISIT DISCLAIMER      Fidencio Martin verbally agrees to participate in [employer]  Pt is aware that [employer] could be limited without vital signs or the ability to perform a full hands-on physical Ariandria Cano understands she or the provider may request at any time to terminate the video visit and request the patient to seek care or treatment in person

## 2022-03-30 RX ORDER — DEXTROAMPHETAMINE SACCHARATE, AMPHETAMINE ASPARTATE, DEXTROAMPHETAMINE SULFATE AND AMPHETAMINE SULFATE 1.25; 1.25; 1.25; 1.25 MG/1; MG/1; MG/1; MG/1
5 TABLET ORAL 2 TIMES DAILY
Qty: 60 TABLET | Refills: 0 | Status: SHIPPED | OUTPATIENT
Start: 2022-03-30 | End: 2022-05-04 | Stop reason: SDUPTHER

## 2022-04-07 ENCOUNTER — TELEMEDICINE (OUTPATIENT)
Dept: FAMILY MEDICINE CLINIC | Facility: CLINIC | Age: 42
End: 2022-04-07
Payer: COMMERCIAL

## 2022-04-07 VITALS — HEIGHT: 65 IN | BODY MASS INDEX: 30.16 KG/M2 | WEIGHT: 181 LBS

## 2022-04-07 DIAGNOSIS — F41.9 ANXIETY: ICD-10-CM

## 2022-04-07 DIAGNOSIS — M51.36 LUMBAR DEGENERATIVE DISC DISEASE: ICD-10-CM

## 2022-04-07 DIAGNOSIS — F90.1 ATTENTION DEFICIT HYPERACTIVITY DISORDER (ADHD), PREDOMINANTLY HYPERACTIVE TYPE: Primary | ICD-10-CM

## 2022-04-07 PROCEDURE — 3725F SCREEN DEPRESSION PERFORMED: CPT | Performed by: NURSE PRACTITIONER

## 2022-04-07 PROCEDURE — 99213 OFFICE O/P EST LOW 20 MIN: CPT | Performed by: NURSE PRACTITIONER

## 2022-04-07 PROCEDURE — 3008F BODY MASS INDEX DOCD: CPT | Performed by: NURSE PRACTITIONER

## 2022-04-07 RX ORDER — DULOXETIN HYDROCHLORIDE 30 MG/1
30 CAPSULE, DELAYED RELEASE ORAL DAILY
COMMUNITY
Start: 2022-04-05 | End: 2022-05-18 | Stop reason: DRUGHIGH

## 2022-04-07 NOTE — ASSESSMENT & PLAN NOTE
Pt reports she is doing very well overall on adderall  Symptoms greatly improved  Continue as directed

## 2022-04-07 NOTE — ASSESSMENT & PLAN NOTE
Pt was started on cymbalta from pain management and doing well overall  Pain improved  Continues to have LLE weakness  Encourage gentle stretching and regular exercise   Consult PT if symptoms persist

## 2022-04-07 NOTE — PROGRESS NOTES
Virtual Regular Visit    Verification of patient location:    Patient is located in the following state in which I hold an active license [place]      Assessment/Plan:    Problem List Items Addressed This Visit        Musculoskeletal and Integument    Lumbar degenerative disc disease     Pt was started on cymbalta from pain management and doing well overall  Pain improved  Continues to have LLE weakness  Encourage gentle stretching and regular exercise  Consult PT if symptoms persist              Other    Anxiety     Pt taking cymbalta for pain  She was instructed by pain management to reduce sertraline to 50 mg daily and she is doing well on this  Stable, no changes today  Attention deficit hyperactivity disorder (ADHD), predominantly hyperactive type - Primary     Pt reports she is doing very well overall on adderall  Symptoms greatly improved  Continue as directed  Relevant Medications    DULoxetine (CYMBALTA) 30 mg delayed release capsule               Reason for visit is   Chief Complaint   Patient presents with    Follow-up    Virtual Regular Visit        Encounter provider MEJIA Quiroz    Provider located at [address] [place] [address]  [phone]      Recent Visits  No visits were found meeting these conditions  Showing recent visits within past 7 days and meeting all other requirements  Today's Visits  Date Type Provider Dept   04/07/22 [facility]   Showing today's visits and meeting all other requirements  Future Appointments  No visits were found meeting these conditions  Showing future appointments within next 150 days and meeting all other requirements       The patient was identified by name and date of birth   Tawanda Obrien was informed that this is a telemedicine visit and that the visit is being conducted through [address] [facility] and patient was informed that this is a secure, HIPAA-compliant platform  She agrees to proceed     My office door was closed  No one else was in the room  She acknowledged consent and understanding of privacy and security of the video platform  The patient has agreed to participate and understands they can discontinue the visit at any time  Patient is aware this is a billable service  Subjective  Mayda Truong is a 46 y o  female who is scheduled for a virtual med check  Pt reports ADHD symptoms are greatly improved with use of adderall  She reports left lower back and leg pain improved with use of Cymbalta  She has decreased her sertraline to 50 mg daily as per instructions from pain management due to starting Cymbalta  Pt denies chest pain or palpitations  Past Medical History:   Diagnosis Date    Anxiety 2020    Attention deficit hyperactivity disorder (ADHD), predominantly hyperactive type 3/10/2022    Lumbar degenerative disc disease 3/10/2022    Patient denies medical problems        Past Surgical History:   Procedure Laterality Date     SECTION         Current Outpatient Medications   Medication Sig Dispense Refill    ALPRAZolam (XANAX) 0 25 mg tablet Take 1 tablet (0 25 mg total) by mouth daily at bedtime as needed for anxiety 30 tablet 0    amphetamine-dextroamphetamine (ADDERALL, 5MG,) 5 MG tablet Take 1 tablet (5 mg total) by mouth 2 (two) times a day Max Daily Amount: 10 mg 60 tablet 0    DULoxetine (CYMBALTA) 30 mg delayed release capsule Take 30 mg by mouth daily      VAHE FE 1  1 5-30 MG-MCG tablet   0    Prenatal MV & Min w/FA-DHA (PRENATAL ADULT GUMMY/DHA/FA PO) Take by mouth      sertraline (Zoloft) 25 mg tablet Take 1 tablet (25 mg total) by mouth daily (Patient taking differently: Take 100 mg by mouth daily  ) 7 tablet 0     No current facility-administered medications for this visit          No Known Allergies    Review of Systems   Cardiovascular: Negative for chest pain and palpitations  Musculoskeletal: Positive for back pain  Neurological: Positive for weakness (LLE)  Psychiatric/Behavioral: The patient is not nervous/anxious and is not hyperactive  Video Exam    Vitals:    04/07/22 1125   Weight: 82 1 kg (181 lb)   Height: 5' 4 5" (1 638 m)       Physical Exam  Vitals reviewed  Constitutional:       Appearance: Normal appearance  HENT:      Head: Normocephalic and atraumatic  Neurological:      Mental Status: She is alert and oriented to person, place, and time  Psychiatric:         Mood and Affect: Mood normal           I spent 20 minutes directly with the patient during this visit    VIRTUAL VISIT DISCLAIMER      Shanice Cheng verbally agrees to participate in [employer]  Pt is aware that [employer] could be limited without vital signs or the ability to perform a full hands-on physical Mayra Breath understands she or the provider may request at any time to terminate the video visit and request the patient to seek care or treatment in person

## 2022-04-07 NOTE — ASSESSMENT & PLAN NOTE
Pt taking cymbalta for pain  She was instructed by pain management to reduce sertraline to 50 mg daily and she is doing well on this  Stable, no changes today

## 2022-05-04 ENCOUNTER — TELEMEDICINE (OUTPATIENT)
Dept: FAMILY MEDICINE CLINIC | Facility: CLINIC | Age: 42
End: 2022-05-04
Payer: COMMERCIAL

## 2022-05-04 DIAGNOSIS — F90.1 ATTENTION DEFICIT HYPERACTIVITY DISORDER (ADHD), PREDOMINANTLY HYPERACTIVE TYPE: Primary | ICD-10-CM

## 2022-05-04 DIAGNOSIS — F41.9 ANXIETY: ICD-10-CM

## 2022-05-04 DIAGNOSIS — F43.21 GRIEF REACTION: ICD-10-CM

## 2022-05-04 PROCEDURE — 99214 OFFICE O/P EST MOD 30 MIN: CPT | Performed by: NURSE PRACTITIONER

## 2022-05-04 RX ORDER — SERTRALINE HYDROCHLORIDE 25 MG/1
25 TABLET, FILM COATED ORAL 2 TIMES DAILY
Qty: 60 TABLET | Refills: 6 | Status: SHIPPED | OUTPATIENT
Start: 2022-05-04 | End: 2022-07-11 | Stop reason: DRUGHIGH

## 2022-05-04 RX ORDER — DEXTROAMPHETAMINE SACCHARATE, AMPHETAMINE ASPARTATE, DEXTROAMPHETAMINE SULFATE AND AMPHETAMINE SULFATE 1.25; 1.25; 1.25; 1.25 MG/1; MG/1; MG/1; MG/1
5 TABLET ORAL 2 TIMES DAILY
Qty: 60 TABLET | Refills: 0 | Status: SHIPPED | OUTPATIENT
Start: 2022-05-04 | End: 2022-06-03 | Stop reason: DRUGHIGH

## 2022-05-04 NOTE — PROGRESS NOTES
Virtual Regular Visit    Verification of patient location:    Patient is located in the following state in which I hold an active license [place]      Assessment/Plan:    Problem List Items Addressed This Visit        Other    Anxiety     Overall improvement with increased dose of sertraline to 100 mg daily  Recommend that she continue  Relevant Medications    sertraline (Zoloft) 25 mg tablet    Attention deficit hyperactivity disorder (ADHD), predominantly hyperactive type - Primary     Reports significant improvement with use of adderall  Denies any issues at this time  Stable, no changes  Relevant Medications    amphetamine-dextroamphetamine (ADDERALL, 5MG,) 5 MG tablet    sertraline (Zoloft) 25 mg tablet      Other Visit Diagnoses     Grief reaction        Relevant Medications    sertraline (Zoloft) 25 mg tablet               Reason for visit is   Chief Complaint   Patient presents with    Virtual Regular Visit        Encounter provider Deven Palacios, [facility]    Provider located at [address] [place] [address]  [phone]      Recent Visits  No visits were found meeting these conditions  Showing recent visits within past 7 days and meeting all other requirements  Today's Visits  Date Type Provider Dept   05/04/22 Telemedicine Deven Palacios, [address] [place] [address] [facility]   Showing today's visits and meeting all other requirements  Future Appointments  No visits were found meeting these conditions  Showing future appointments within next 150 days and meeting all other requirements       The patient was identified by name and date of birth  Vishal Davalos was informed that this is a telemedicine visit and that the visit is being conducted through [address] [place] [address] Now and patient was informed that this is a secure, HIPAA-compliant platform  She agrees to proceed     My office door was closed  No one else was in the room    She acknowledged consent and understanding of privacy and security of the video platform  The patient has agreed to participate and understands they can discontinue the visit at any time  Patient is aware this is a billable service  Gabi Catalan is a 46year old female with depression, anxiety and ADHD who presents to the office for discussion of medications  Pt reports that she has chronic back pain and sciatica and has been following up with pain management  States that she was instructed by pain management to increase her Cymbalta to twice daily as her sciatic pain was not improved with back injections  Pt reports that she has noticed a difference in her mood with increased cymbalta, increased sertraline from 50 mg to 100 mg daily  States she would like go back to increasing her sertraline to 100 mg  States that she had begun to feel her anxiety returning with dose reduction          Past Medical History:   Diagnosis Date    Anxiety 2020    Attention deficit hyperactivity disorder (ADHD), predominantly hyperactive type 3/10/2022    Lumbar degenerative disc disease 3/10/2022    Patient denies medical problems        Past Surgical History:   Procedure Laterality Date     SECTION         Current Outpatient Medications   Medication Sig Dispense Refill    ALPRAZolam (XANAX) 0 25 mg tablet Take 1 tablet (0 25 mg total) by mouth daily at bedtime as needed for anxiety 30 tablet 0    amphetamine-dextroamphetamine (ADDERALL, 5MG,) 5 MG tablet Take 1 tablet (5 mg total) by mouth 2 (two) times a day Max Daily Amount: 10 mg 60 tablet 0    DULoxetine (CYMBALTA) 30 mg delayed release capsule Take 30 mg by mouth daily      VAHE FE 1  1 5-30 MG-MCG tablet   0    Prenatal MV & Min w/FA-DHA (PRENATAL ADULT GUMMY/DHA/FA PO) Take by mouth      sertraline (Zoloft) 25 mg tablet Take 1 tablet (25 mg total) by mouth 2 (two) times a day 60 tablet 6     No current facility-administered medications for this visit  No Known Allergies    Review of Systems   Constitutional: Negative for chills, fatigue and fever  Respiratory: Negative for cough, chest tightness and shortness of breath  Cardiovascular: Negative for chest pain, palpitations and leg swelling  Psychiatric/Behavioral: Negative for decreased concentration  The patient is nervous/anxious  Video Exam    There were no vitals filed for this visit  Physical Exam  Vitals reviewed  Constitutional:       Appearance: Normal appearance  HENT:      Head: Normocephalic and atraumatic  Neurological:      Mental Status: She is alert and oriented to person, place, and time  Psychiatric:         Mood and Affect: Mood normal           I spent 20 minutes directly with the patient during this visit    VIRTUAL VISIT DISCLAIMER      Gerhard Castellanos verbally agrees to participate in [employer]  Pt is aware that [employer] could be limited without vital signs or the ability to perform a full hands-on physical Gaanne-marie Villaseñor understands she or the provider may request at any time to terminate the video visit and request the patient to seek care or treatment in person

## 2022-05-04 NOTE — ASSESSMENT & PLAN NOTE
Reports significant improvement with use of adderall  Denies any issues at this time  Stable, no changes

## 2022-05-18 ENCOUNTER — TELEMEDICINE (OUTPATIENT)
Dept: FAMILY MEDICINE CLINIC | Facility: CLINIC | Age: 42
End: 2022-05-18
Payer: COMMERCIAL

## 2022-05-18 VITALS — HEIGHT: 65 IN | BODY MASS INDEX: 30.16 KG/M2 | WEIGHT: 181 LBS

## 2022-05-18 DIAGNOSIS — M51.36 LUMBAR DEGENERATIVE DISC DISEASE: ICD-10-CM

## 2022-05-18 DIAGNOSIS — F41.9 ANXIETY: Primary | ICD-10-CM

## 2022-05-18 DIAGNOSIS — F90.1 ATTENTION DEFICIT HYPERACTIVITY DISORDER (ADHD), PREDOMINANTLY HYPERACTIVE TYPE: ICD-10-CM

## 2022-05-18 PROCEDURE — 99214 OFFICE O/P EST MOD 30 MIN: CPT | Performed by: NURSE PRACTITIONER

## 2022-05-18 RX ORDER — DULOXETIN HYDROCHLORIDE 20 MG/1
20 CAPSULE, DELAYED RELEASE ORAL DAILY
Qty: 30 CAPSULE | Refills: 0 | Status: SHIPPED | OUTPATIENT
Start: 2022-05-18 | End: 2022-07-01 | Stop reason: SDUPTHER

## 2022-05-18 NOTE — PROGRESS NOTES
Virtual Regular Visit    Verification of patient location:    Patient is located in the following state in which I hold an active license [place]      Assessment/Plan:    Problem List Items Addressed This Visit        Musculoskeletal and Integument    Lumbar degenerative disc disease     PT does not feel improvement on increased dose of cymbalta  We discussed taper, provided instructions  Taper Cymbalta 30mg nightly currently, start 20mg x's 7 days, then 20 mg every other day for 7 days  Recheck in 4 weeks  Relevant Medications    DULoxetine (CYMBALTA) 20 mg capsule       Other    Anxiety - Primary     Doing well on sertraline overall  No dose changes at this time  Taper of cymbalta, consider increase in sertraline dose if pt develops mood changes  Discussed with pt in detail  Verbalized understanding and is in agreement with plan  Relevant Medications    DULoxetine (CYMBALTA) 20 mg capsule    Attention deficit hyperactivity disorder (ADHD), predominantly hyperactive type     Tolerating adderall without issues  Advise that she may take 10 mg at once if this is more helpful for her  Relevant Medications    DULoxetine (CYMBALTA) 20 mg capsule               Reason for visit is   Chief Complaint   Patient presents with    Medication Management     Pt here to discuss cymbalta    Virtual Regular Visit        Encounter provider MEJIA Salas    Provider located at [address] [place] [id number]      Recent Visits  Date Type Provider Dept   05/18/22 [address] [place] [address], [address] [place] [address] [address] [facility]   Showing recent visits within past 7 days and meeting all other requirements  Future Appointments  No visits were found meeting these conditions  Showing future appointments within next 150 days and meeting all other requirements       The patient was identified by name and date of birth   Simba Rashid Chyna Boucher was informed that this is a telemedicine visit and that the visit is being conducted through [address] Now and patient was informed that this is a secure, HIPAA-compliant platform  She agrees to proceed     My office door was closed  No one else was in the room  She acknowledged consent and understanding of privacy and security of the video platform  The patient has agreed to participate and understands they can discontinue the visit at any time  Patient is aware this is a billable service  Erica Cruz is a 38year old female with anxiety, ADHD and chronic lower back pain with sciatica who has scheduled a virtual visit to discuss stopping Cymbalta  Pt reports that she is not having any improvement from Cymbalta for her pain or her mood so she would like to stop it at this time  Reports she is feeling well overall  Past Medical History:   Diagnosis Date    Anxiety 2020    Attention deficit hyperactivity disorder (ADHD), predominantly hyperactive type 3/10/2022    Lumbar degenerative disc disease 3/10/2022    Patient denies medical problems        Past Surgical History:   Procedure Laterality Date     SECTION         Current Outpatient Medications   Medication Sig Dispense Refill    ALPRAZolam (XANAX) 0 25 mg tablet Take 1 tablet (0 25 mg total) by mouth daily at bedtime as needed for anxiety 30 tablet 0    amphetamine-dextroamphetamine (ADDERALL, 5MG,) 5 MG tablet Take 1 tablet (5 mg total) by mouth 2 (two) times a day Max Daily Amount: 10 mg 60 tablet 0    DULoxetine (CYMBALTA) 20 mg capsule Take 1 capsule (20 mg total) by mouth in the morning  Take 20 mg daily x's 7 days, then take 20 mg every other day for 7 days    30 capsule 0    VAHE FE 1  1 5-30 MG-MCG tablet   0    Prenatal MV & Min w/FA-DHA (PRENATAL ADULT GUMMY/DHA/FA PO) Take by mouth      sertraline (Zoloft) 25 mg tablet Take 1 tablet (25 mg total) by mouth 2 (two) times a day (Patient taking differently: Take 50 mg by mouth in the morning and 50 mg before bedtime ) 60 tablet 6     No current facility-administered medications for this visit  No Known Allergies    Review of Systems   Psychiatric/Behavioral: Negative for decreased concentration and sleep disturbance  The patient is not nervous/anxious  Video Exam    Vitals:    05/18/22 0814   Weight: 82 1 kg (181 lb)   Height: 5' 4 5" (1 638 m)       Physical Exam  Vitals reviewed  Constitutional:       Appearance: Normal appearance  HENT:      Head: Normocephalic and atraumatic  Neurological:      Mental Status: She is alert and oriented to person, place, and time  Psychiatric:         Mood and Affect: Mood normal           I spent 25 minutes directly with the patient during this visit    VIRTUAL VISIT DISCLAIMER      Hari Borja verbally agrees to participate in [employer]  Pt is aware that [employer] could be limited without vital signs or the ability to perform a full hands-on physical Russ Daily understands she or the provider may request at any time to terminate the video visit and request the patient to seek care or treatment in person

## 2022-05-19 NOTE — ASSESSMENT & PLAN NOTE
Tolerating adderall without issues  Advise that she may take 10 mg at once if this is more helpful for her

## 2022-05-19 NOTE — ASSESSMENT & PLAN NOTE
Doing well on sertraline overall  No dose changes at this time  Taper of cymbalta, consider increase in sertraline dose if pt develops mood changes  Discussed with pt in detail  Verbalized understanding and is in agreement with plan

## 2022-05-19 NOTE — ASSESSMENT & PLAN NOTE
PT does not feel improvement on increased dose of cymbalta  We discussed taper, provided instructions  Taper Cymbalta 30mg nightly currently, start 20mg x's 7 days, then 20 mg every other day for 7 days  Recheck in 4 weeks

## 2022-05-25 ENCOUNTER — TELEMEDICINE (OUTPATIENT)
Dept: FAMILY MEDICINE CLINIC | Facility: CLINIC | Age: 42
End: 2022-05-25
Payer: COMMERCIAL

## 2022-05-25 VITALS — WEIGHT: 181 LBS | BODY MASS INDEX: 30.16 KG/M2 | HEIGHT: 65 IN

## 2022-05-25 DIAGNOSIS — F90.1 ATTENTION DEFICIT HYPERACTIVITY DISORDER (ADHD), PREDOMINANTLY HYPERACTIVE TYPE: ICD-10-CM

## 2022-05-25 DIAGNOSIS — R53.83 FATIGUE, UNSPECIFIED TYPE: ICD-10-CM

## 2022-05-25 DIAGNOSIS — M54.32 NEURALGIA OF LEFT SCIATIC NERVE: ICD-10-CM

## 2022-05-25 DIAGNOSIS — F41.9 ANXIETY: Primary | ICD-10-CM

## 2022-05-25 PROCEDURE — 99214 OFFICE O/P EST MOD 30 MIN: CPT | Performed by: NURSE PRACTITIONER

## 2022-05-25 PROCEDURE — 3008F BODY MASS INDEX DOCD: CPT | Performed by: NURSE PRACTITIONER

## 2022-05-25 RX ORDER — FLUTICASONE PROPIONATE 50 MCG
1 SPRAY, SUSPENSION (ML) NASAL DAILY
COMMUNITY

## 2022-05-25 NOTE — ASSESSMENT & PLAN NOTE
Pt is doing well on adderall overall  Reports mid-morning fatigue  Advise hydration and regular exercise  Consider increase dose at next refill  Advise that she continue counseling as scheduled

## 2022-05-25 NOTE — ASSESSMENT & PLAN NOTE
Pt taking sertraline  She has been noticing decrease in mood and fatigue since starting cymbalta taper  Discussed with patient and she is in agreement to stay on cymbalta at this time  Possible taper in the future when she feels ready  Advise medication compliance

## 2022-05-25 NOTE — PROGRESS NOTES
Virtual Regular Visit    Verification of patient location:    Patient is located in the following state in which I hold an active license [place]      Assessment/Plan:    Problem List Items Addressed This Visit        Nervous and Auditory    Neuralgia of left sciatic nerve       Other    Anxiety - Primary     Pt taking sertraline  She has been noticing decrease in mood and fatigue since starting cymbalta taper  Discussed with patient and she is in agreement to stay on cymbalta at this time  Possible taper in the future when she feels ready  Advise medication compliance  Attention deficit hyperactivity disorder (ADHD), predominantly hyperactive type     Pt is doing well on adderall overall  Reports mid-morning fatigue  Advise hydration and regular exercise  Consider increase dose at next refill  Advise that she continue counseling as scheduled  Other Visit Diagnoses     Fatigue, unspecified type        Since starting Cymbalta taper, feeling mid-morning fatigue  Advise she continue cymbalta as directed  Consider taper at a later time  Reason for visit is   Chief Complaint   Patient presents with    Medication Management     Pt here med check    Virtual Regular Visit        Encounter provider MEJIA Quiroz    Provider located at [address] [place] [phone]      Recent Visits  Date Type Provider Dept   05/18/22 [address] [place] [address], [facility]   Showing recent visits within past 7 days and meeting all other requirements  Today's Visits  Date Type Provider Dept   05/25/22 Telemedicine Avelina Soriano, [facility]   Showing today's visits and meeting all other requirements  Future Appointments  No visits were found meeting these conditions    Showing future appointments within next 150 days and meeting all other requirements       The patient was identified by name and date of birth  Vishal Davalos was informed that this is a telemedicine visit and that the visit is being conducted through [address] [place] [address] Now and patient was informed that this is a secure, HIPAA-compliant platform  She agrees to proceed     My office door was closed  No one else was in the room  She acknowledged consent and understanding of privacy and security of the video platform  The patient has agreed to participate and understands they can discontinue the visit at any time  Patient is aware this is a billable service  Erica Cruz is a 38year old female who has scheduled a virtual visit for discussion of her medications  Pt was instructed to complete cymbalta taper as she felt no change when taking the medication in her mood or her sciatica  Her initial rx was provided by pain management  Pt reports that since starting the taper, she has been experiencing mid-morning fatigue that is significant  States symptoms began about 2 days ago and she started the taper about one week ago  Past Medical History:   Diagnosis Date    Anxiety 2020    Attention deficit hyperactivity disorder (ADHD), predominantly hyperactive type 3/10/2022    Lumbar degenerative disc disease 3/10/2022    Patient denies medical problems        Past Surgical History:   Procedure Laterality Date     SECTION         Current Outpatient Medications   Medication Sig Dispense Refill    ALPRAZolam (XANAX) 0 25 mg tablet Take 1 tablet (0 25 mg total) by mouth daily at bedtime as needed for anxiety 30 tablet 0    amphetamine-dextroamphetamine (ADDERALL, 5MG,) 5 MG tablet Take 1 tablet (5 mg total) by mouth 2 (two) times a day Max Daily Amount: 10 mg 60 tablet 0    DULoxetine (CYMBALTA) 20 mg capsule Take 1 capsule (20 mg total) by mouth in the morning  Take 20 mg daily x's 7 days, then take 20 mg every other day for 7 days    30 capsule 0    fluticasone (FLONASE) 50 mcg/act nasal spray 1 spray into each nostril daily      VAHE FE 1 5/30 1 5-30 MG-MCG tablet   0    Prenatal MV & Min w/FA-DHA (PRENATAL ADULT GUMMY/DHA/FA PO) Take by mouth      sertraline (Zoloft) 25 mg tablet Take 1 tablet (25 mg total) by mouth 2 (two) times a day (Patient taking differently: Take 50 mg by mouth in the morning and 50 mg before bedtime ) 60 tablet 6     No current facility-administered medications for this visit  No Known Allergies    Review of Systems   Constitutional: Positive for fatigue  Negative for chills and fever  Respiratory: Negative for cough, chest tightness and shortness of breath  Cardiovascular: Negative for chest pain and leg swelling  Psychiatric/Behavioral: Negative for sleep disturbance  The patient is not nervous/anxious  Video Exam    Vitals:    05/25/22 0828   Weight: 82 1 kg (181 lb)   Height: 5' 4 5" (1 638 m)       Physical Exam  Vitals reviewed  Constitutional:       Appearance: Normal appearance  HENT:      Head: Normocephalic and atraumatic  Neurological:      Mental Status: She is alert and oriented to person, place, and time  Psychiatric:         Mood and Affect: Mood normal           I spent 20 minutes directly with the patient during this visit    VIRTUAL VISIT DISCLAIMER      Hartwell Halsted verbally agrees to participate in [employer]  Pt is aware that [employer] could be limited without vital signs or the ability to perform a full hands-on physical Bethany Sullivan understands she or the provider may request at any time to terminate the video visit and request the patient to seek care or treatment in person

## 2022-06-03 ENCOUNTER — TELEPHONE (OUTPATIENT)
Dept: FAMILY MEDICINE CLINIC | Facility: CLINIC | Age: 42
End: 2022-06-03

## 2022-06-03 DIAGNOSIS — F90.1 ATTENTION DEFICIT HYPERACTIVITY DISORDER (ADHD), PREDOMINANTLY HYPERACTIVE TYPE: ICD-10-CM

## 2022-06-03 RX ORDER — DEXTROAMPHETAMINE SACCHARATE, AMPHETAMINE ASPARTATE, DEXTROAMPHETAMINE SULFATE AND AMPHETAMINE SULFATE 2.5; 2.5; 2.5; 2.5 MG/1; MG/1; MG/1; MG/1
10 TABLET ORAL
Qty: 60 TABLET | Refills: 0 | Status: SHIPPED | OUTPATIENT
Start: 2022-06-03 | End: 2022-07-12 | Stop reason: SDUPTHER

## 2022-06-03 NOTE — TELEPHONE ENCOUNTER
Ling White has 2 more days left of adderal, but she thought you said you would up it to 10mg 1 in morning & 1 in afternoon      Vincent Weldon

## 2022-06-23 ENCOUNTER — TELEMEDICINE (OUTPATIENT)
Dept: BEHAVIORAL/MENTAL HEALTH CLINIC | Facility: CLINIC | Age: 42
End: 2022-06-23
Payer: COMMERCIAL

## 2022-06-23 DIAGNOSIS — F41.9 ANXIETY: Primary | ICD-10-CM

## 2022-06-23 DIAGNOSIS — F43.21 ADJUSTMENT DISORDER WITH DEPRESSED MOOD: ICD-10-CM

## 2022-06-23 PROCEDURE — 90837 PSYTX W PT 60 MINUTES: CPT | Performed by: SOCIAL WORKER

## 2022-06-28 NOTE — PSYCH
This note was not shared with the patient due to this is a psychotherapy note   Virtual Regular Visit    Verification of patient location:    Patient is located in the following state in which I hold an active license [place]      Assessment/Plan:    Problem List Items Addressed This Visit        Other    Anxiety - Primary      Other Visit Diagnoses     Adjustment disorder with depressed mood                   Reason for visit is No chief complaint on file  Encounter provider Jim Blank LCSW    Provider located at [address] [place] [id number] [phone]      Recent Visits  Date Type Provider Dept   06/23/22 [address], [address] [facility]   Showing recent visits within past 7 days and meeting all other requirements  Future Appointments  No visits were found meeting these conditions  Showing future appointments within next 150 days and meeting all other requirements       The patient was identified by name and date of birth  Vishal Davalos was informed that this is a telemedicine visit and that the visit is being conducted through [employer] and patient was informed this is a secure, HIPAA-complaint platform  She agrees to proceed     My office door was closed  No one else was in the room  She acknowledged consent and understanding of privacy and security of the video platform  The patient has agreed to participate and understands they can discontinue the visit at any time  Patient is aware this is a billable service  Subjective  Vishal Davalos is a 38 y o  female who presented for a follow-up virtual individual counseling session  At [place]'s request, today's session was conducted as a virtual phone session using [employer]  Moses Dickerson [place] and 79year old mother-in-law  Jackie returns to therapy after a 12 week gap in services  Jackie reports her pain has been manageable and she officially stopped physical therapy because it made her worse than better  Jackie repots she is also prescribed Adderall 2 time a day and Cymbalta along with Zoloft- but she continues to feel irritable  Jackie reports the Adderall has been able to calm her, but reports she's still overwhelmed and feels the last 2 months have been "Crazy " The undersigned therapist assisted Jackie process her feelings and negative thoughts of "I'm not a good mother, not a good wife or not a good friend "  Jackie reports she feels she has no break from her children and the undersigned therapist encouraged she implement strategies to have alone time: whether paying a , being assertive with her  and overall being more communicative when she's overwhelmed  Jackie continues to struggle with organization and feels overwhelmed with clutter, and we discussed ways to have other family members help her  Jackie denies suicidal intent, gesture or ideation at this time  HPI     Past Medical History:   Diagnosis Date    Anxiety 2020    Attention deficit hyperactivity disorder (ADHD), predominantly hyperactive type 3/10/2022    Lumbar degenerative disc disease 3/10/2022    Patient denies medical problems        Past Surgical History:   Procedure Laterality Date     SECTION         Current Outpatient Medications   Medication Sig Dispense Refill    ALPRAZolam (XANAX) 0 25 mg tablet Take 1 tablet (0 25 mg total) by mouth daily at bedtime as needed for anxiety 30 tablet 0    amphetamine-dextroamphetamine (ADDERALL, 10MG,) 10 mg tablet Take 1 tablet (10 mg total) by mouth 2 (two) times a day Max Daily Amount: 20 mg 60 tablet 0    DULoxetine (CYMBALTA) 20 mg capsule Take 1 capsule (20 mg total) by mouth in the morning   Take 20 mg daily x's 7 days, then take 20 mg every other day for 7 days  30 capsule 0    fluticasone (FLONASE) 50 mcg/act nasal spray 1 spray into each nostril daily      VAHE FE 1 5/30 1 5-30 MG-MCG tablet   0    Prenatal MV & Min w/FA-DHA (PRENATAL ADULT GUMMY/DHA/FA PO) Take by mouth      sertraline (Zoloft) 25 mg tablet Take 1 tablet (25 mg total) by mouth 2 (two) times a day (Patient taking differently: Take 50 mg by mouth in the morning and 50 mg before bedtime ) 60 tablet 6     No current facility-administered medications for this visit  No Known Allergies    Review of Systems   Psychiatric/Behavioral: Positive for dysphoric mood  The patient is nervous/anxious  Video Exam    There were no vitals filed for this visit  Physical Exam  Psychiatric:         Attention and Perception: Attention and perception normal          Mood and Affect: Mood is anxious  Affect is tearful  Speech: Speech normal          Behavior: Behavior normal  Behavior is cooperative  Thought Content: Thought content normal          Cognition and Memory: Cognition and memory normal          Judgment: Judgment normal           I spent 60 minutes directly with the patient during this visit    VIRTUAL VISIT DISCLAIMER      Lindsay Joseph verbally agrees to participate in [employer]  Pt is aware that [employer] could be limited without vital signs or the ability to perform a full hands-on physical Niru Melgoza understands she or the provider may request at any time to terminate the video visit and request the patient to seek care or treatment in person

## 2022-06-28 NOTE — PATIENT INSTRUCTIONS
Please continue to schedule a follow-up session within one week  In case of a psychiatric emergency please contact any of the following:  [facility] [phone] or [phone]     National Suicide Prevention Lifeline : 4-599.541.4139

## 2022-07-01 DIAGNOSIS — F41.9 ANXIETY: ICD-10-CM

## 2022-07-01 DIAGNOSIS — M51.36 LUMBAR DEGENERATIVE DISC DISEASE: ICD-10-CM

## 2022-07-01 RX ORDER — DULOXETIN HYDROCHLORIDE 20 MG/1
20 CAPSULE, DELAYED RELEASE ORAL DAILY
Qty: 30 CAPSULE | Refills: 0 | Status: SHIPPED | OUTPATIENT
Start: 2022-07-01 | End: 2022-08-03

## 2022-07-08 ENCOUNTER — TELEMEDICINE (OUTPATIENT)
Dept: BEHAVIORAL/MENTAL HEALTH CLINIC | Facility: CLINIC | Age: 42
End: 2022-07-08

## 2022-07-08 ENCOUNTER — TELEPHONE (OUTPATIENT)
Dept: FAMILY MEDICINE CLINIC | Facility: CLINIC | Age: 42
End: 2022-07-08

## 2022-07-08 DIAGNOSIS — F43.21 ADJUSTMENT DISORDER WITH DEPRESSED MOOD: ICD-10-CM

## 2022-07-08 DIAGNOSIS — F90.1 ADHD, PREDOMINANTLY HYPERACTIVE TYPE: Primary | ICD-10-CM

## 2022-07-08 DIAGNOSIS — F41.1 GENERALIZED ANXIETY DISORDER: ICD-10-CM

## 2022-07-08 DIAGNOSIS — F41.9 ANXIETY: Primary | ICD-10-CM

## 2022-07-08 NOTE — TELEPHONE ENCOUNTER
Deyanira Mares states Nikhil Rashid wanted to change her sertraline 100mg 2 x a day    Please update rx and call into [facility]  (has enough to wait for next week when Nikhil Rashid is back from vacation)

## 2022-07-11 RX ORDER — SERTRALINE HYDROCHLORIDE 100 MG/1
100 TABLET, FILM COATED ORAL 2 TIMES DAILY
Qty: 60 TABLET | Refills: 2 | Status: SHIPPED | OUTPATIENT
Start: 2022-07-11 | End: 2022-10-13

## 2022-07-11 NOTE — PATIENT INSTRUCTIONS
Please continue to schedule a follow-up session within one week  In case of a psychiatric emergency please contact any of the following:  [facility] [phone] or [phone]     [place] Suicide Prevention Lifeline : 9-446.738.5532

## 2022-07-11 NOTE — PSYCH
This note was not shared with the patient due to this is a psychotherapy note     Virtual Regular Visit    Verification of patient location:    Patient is located in the following state in which I hold an active license [place]      Assessment/Plan:    Problem List Items Addressed This Visit    None     Visit Diagnoses     ADHD, predominantly hyperactive type    -  Primary    Adjustment disorder with depressed mood                   Reason for visit is No chief complaint on file  Encounter provider Brock Ryder LCSW    Provider located at [address]  [address] [phone]      Recent Visits  Date Type Provider Dept   07/08/22 Telemedicine Brock Ryder LCSW [facility]   Showing recent visits within past 7 days and meeting all other requirements  Future Appointments  No visits were found meeting these conditions  Showing future appointments within next 150 days and meeting all other requirements       The patient was identified by name and date of birth  Jacob Ward was informed that this is a telemedicine visit and that the visit is being conducted through [facility] and patient was informed this is a secure, HIPAA-complaint platform  She agrees to proceed     My office door was closed  No one else was in the room  She acknowledged consent and understanding of privacy and security of the video platform  The patient has agreed to participate and understands they can discontinue the visit at any time  Patient is aware this is a billable service  Subjective  Jacob Ward is a 30 y o  female who presented for a follow-up virtual individual counseling session  At Claridge's request, today's session was conducted as a virtual phone session in order to comply with social distancing secondary to the coronavirus pandemic    It was the undersigned therapist's intent to complete a video visit but Fabien Cartwright was unable to make video connection so we defaulted to the phone  Fabien Cartwright reports she spoke to the friend [place] who had been causing her a lot of anxiety at the gym  Fortunately, [place] was receptive and open in talking to Fabien Cartwright compared to the previous attempt  Fabien Cartwright has been feeling relieved about the conversation and over all feeling better  However, the undressing therapist assisted Fabien Cartwright process her feelings about her mother in law and how she was acting over [place] the shore  Fabien Cartwright reports her mother in law gave the silent treatment for the remainder of the trip and didn't spend time wit her grandchildren  Megan's , Juni Tobar, is distant from his mother, but Fabien Cartwright is worried the same dynamic with her mother may happen  The undersigned therapist assisted Fabien Cartwright process her feelings about this shift in dynamic and continues to encourage individual self-care and time for herself  Fabien Cartwright denies suicidal intent, gesture or ideation at this time  Rosa Maria MARTINEZ     No past medical history on file  Past Surgical History:   Procedure Laterality Date    KNEE SURGERY         Current Outpatient Medications   Medication Sig Dispense Refill    ASCORBIC ACID PO Take 1,000 mg by mouth      etonogestrel-ethinyl estradiol (NUVARING) 0 12-0 015 MG/24HR vaginal ring Insert 1 each into the vagina every 28 days Insert vaginally and leave in place for 3 consecutive weeks, then remove for 1 week   Multiple Vitamins-Minerals (ZINC PO) Take by mouth      NON FORMULARY Take 1 tablet by mouth daily      PROBIOTIC PRODUCT PO Take 1 tablet by mouth daily       No current facility-administered medications for this visit  No Known Allergies    Review of Systems   Psychiatric/Behavioral: The patient is nervous/anxious  Video Exam    There were no vitals filed for this visit      Physical Exam  Psychiatric: Attention and Perception: Attention and perception normal          Mood and Affect: Mood and affect normal          Speech: Speech normal          Behavior: Behavior normal  Behavior is cooperative  Thought Content: Thought content normal          Cognition and Memory: Cognition and memory normal          Judgment: Judgment normal           I spent 30 minutes directly with the patient during this visit    VIRTUAL VISIT [address] verbally agrees to participate in [employer]  Pt is aware that [employer] could be limited without vital signs or the ability to perform a full hands-on physical Srinilu Hubbard understands she or the provider may request at any time to terminate the video visit and request the patient to seek care or treatment in person

## 2022-07-12 DIAGNOSIS — F90.1 ATTENTION DEFICIT HYPERACTIVITY DISORDER (ADHD), PREDOMINANTLY HYPERACTIVE TYPE: ICD-10-CM

## 2022-07-12 RX ORDER — DEXTROAMPHETAMINE SACCHARATE, AMPHETAMINE ASPARTATE, DEXTROAMPHETAMINE SULFATE AND AMPHETAMINE SULFATE 2.5; 2.5; 2.5; 2.5 MG/1; MG/1; MG/1; MG/1
10 TABLET ORAL
Qty: 60 TABLET | Refills: 0 | Status: SHIPPED | OUTPATIENT
Start: 2022-07-12 | End: 2022-08-22

## 2022-07-12 NOTE — TELEPHONE ENCOUNTER
Requested medication(s) are due for refill today: Yes  Patient has already received a courtesy refill: No  Other reason request has been forwarded to providerThis refill cannot be delegated  :

## 2022-08-03 DIAGNOSIS — F41.9 ANXIETY: ICD-10-CM

## 2022-08-03 DIAGNOSIS — M51.36 LUMBAR DEGENERATIVE DISC DISEASE: ICD-10-CM

## 2022-08-03 RX ORDER — DULOXETIN HYDROCHLORIDE 20 MG/1
CAPSULE, DELAYED RELEASE ORAL
Qty: 30 CAPSULE | Refills: 0 | Status: SHIPPED | OUTPATIENT
Start: 2022-08-03 | End: 2022-09-04 | Stop reason: SDUPTHER

## 2022-08-22 DIAGNOSIS — F90.1 ATTENTION DEFICIT HYPERACTIVITY DISORDER (ADHD), PREDOMINANTLY HYPERACTIVE TYPE: ICD-10-CM

## 2022-08-22 RX ORDER — DEXTROAMPHETAMINE SACCHARATE, AMPHETAMINE ASPARTATE, DEXTROAMPHETAMINE SULFATE AND AMPHETAMINE SULFATE 2.5; 2.5; 2.5; 2.5 MG/1; MG/1; MG/1; MG/1
TABLET ORAL
Qty: 60 TABLET | Refills: 0 | Status: SHIPPED | OUTPATIENT
Start: 2022-08-22 | End: 2022-09-14

## 2022-08-30 ENCOUNTER — TELEMEDICINE (OUTPATIENT)
Dept: FAMILY MEDICINE CLINIC | Facility: CLINIC | Age: 42
End: 2022-08-30
Payer: COMMERCIAL

## 2022-08-30 VITALS — BODY MASS INDEX: 30.16 KG/M2 | HEIGHT: 65 IN | WEIGHT: 181 LBS

## 2022-08-30 DIAGNOSIS — M54.32 NEURALGIA OF LEFT SCIATIC NERVE: ICD-10-CM

## 2022-08-30 DIAGNOSIS — F41.1 GENERALIZED ANXIETY DISORDER: ICD-10-CM

## 2022-08-30 DIAGNOSIS — F90.1 ATTENTION DEFICIT HYPERACTIVITY DISORDER (ADHD), PREDOMINANTLY HYPERACTIVE TYPE: Primary | ICD-10-CM

## 2022-08-30 PROCEDURE — 99214 OFFICE O/P EST MOD 30 MIN: CPT | Performed by: NURSE PRACTITIONER

## 2022-08-30 RX ORDER — DIPHENOXYLATE HYDROCHLORIDE AND ATROPINE SULFATE 2.5; .025 MG/1; MG/1
1 TABLET ORAL DAILY
COMMUNITY

## 2022-08-30 NOTE — PROGRESS NOTES
Virtual Regular Visit    Verification of patient location:    Patient is located in the following state in which I hold an active license [place]      Assessment/Plan:    Problem List Items Addressed This Visit        Nervous and Auditory    Neuralgia of left sciatic nerve     Pt reports symptoms exacerbated currently  Taking cymbalta 20 mg daily  Other    Anxiety     Pt taking sertraline 200 mg daily  Advise her to continue this dosing  Encourage self care and anxiety management  Pt has counseling  Attention deficit hyperactivity disorder (ADHD), predominantly hyperactive type - Primary     Adderall increase to 20 mg daily  Monitor for ongoing improvement  Reason for visit is   Chief Complaint   Patient presents with    Medication Management     Pt here fore med check    Virtual Regular Visit        Encounter provider MEJIA Green    Provider located at [address] [place] [address]  [address] [place] [address] Road [address]      Recent Visits  Date Type Provider Dept   08/30/22 16[facility]   Showing recent visits within past 7 days and meeting all other requirements  Future Appointments  No visits were found meeting these conditions  Showing future appointments within next 150 days and meeting all other requirements       The patient was identified by name and date of birth  [employer] was informed that this is a telemedicine visit and that the visit is being conducted through [address] [place] [address] Now and patient was informed that this is a secure, HIPAA-compliant platform  She agrees to proceed     My office door was closed  No one else was in the room  She acknowledged consent and understanding of privacy and security of the video platform  The patient has agreed to participate and understands they can discontinue the visit at any time  Patient is aware this is a billable service  Subjective  Fili Rosenberg is a 38 y o  female who is scheduled for a virtual visit to discuss ongoing anxiety  Reports that she feels like her medications are no longer working well for her  States that she has had a flare up of her sciatica and her anxiety became worse  Reports that she takes her sertraline in 2 separate doses, am and pm  States that her she does feel that her adderall does not seem to work throughout the entire day  Past Medical History:   Diagnosis Date    Anxiety 2020    Attention deficit hyperactivity disorder (ADHD), predominantly hyperactive type 3/10/2022    Lumbar degenerative disc disease 3/10/2022    Patient denies medical problems        Past Surgical History:   Procedure Laterality Date     SECTION         Current Outpatient Medications   Medication Sig Dispense Refill    ALPRAZolam (XANAX) 0 25 mg tablet Take 1 tablet (0 25 mg total) by mouth daily at bedtime as needed for anxiety 30 tablet 0    amphetamine-dextroamphetamine (ADDERALL) 10 mg tablet take 1 tablet by mouth twice a day 60 tablet 0    DULoxetine (CYMBALTA) 20 mg capsule take 1 capsule by mouth once daily 30 capsule 0    fluticasone (FLONASE) 50 mcg/act nasal spray 1 spray into each nostril daily      VAHE FE 1  1 5-30 MG-MCG tablet   0    multivitamin (THERAGRAN) TABS Take 1 tablet by mouth daily      sertraline (ZOLOFT) 100 mg tablet Take 1 tablet (100 mg total) by mouth 2 (two) times a day 60 tablet 2     No current facility-administered medications for this visit  No Known Allergies    Review of Systems   Constitutional: Negative for chills, fatigue and fever  Respiratory: Negative for cough, chest tightness and shortness of breath  Musculoskeletal:        Left sided sciatica   Psychiatric/Behavioral: The patient is nervous/anxious          Video Exam    Vitals:    22 1258   Weight: 82 1 kg (181 lb)   Height: 5' 4 5" (1 638 m)       Physical Exam  Vitals reviewed  Constitutional:       Appearance: Normal appearance  HENT:      Head: Normocephalic and atraumatic  Neurological:      Mental Status: She is alert and oriented to person, place, and time     Psychiatric:         Mood and Affect: Mood normal           I spent 25 minutes directly with the patient during this visit

## 2022-09-01 NOTE — ASSESSMENT & PLAN NOTE
Pt taking sertraline 200 mg daily  Advise her to continue this dosing  Encourage self care and anxiety management  Pt has counseling

## 2022-09-04 DIAGNOSIS — M51.36 LUMBAR DEGENERATIVE DISC DISEASE: ICD-10-CM

## 2022-09-04 DIAGNOSIS — F41.9 ANXIETY: ICD-10-CM

## 2022-09-06 RX ORDER — DULOXETIN HYDROCHLORIDE 20 MG/1
20 CAPSULE, DELAYED RELEASE ORAL DAILY
Qty: 30 CAPSULE | Refills: 0 | Status: SHIPPED | OUTPATIENT
Start: 2022-09-06 | End: 2022-09-14

## 2022-09-14 ENCOUNTER — TELEPHONE (OUTPATIENT)
Dept: FAMILY MEDICINE CLINIC | Facility: CLINIC | Age: 42
End: 2022-09-14

## 2022-09-14 ENCOUNTER — OFFICE VISIT (OUTPATIENT)
Dept: FAMILY MEDICINE CLINIC | Facility: CLINIC | Age: 42
End: 2022-09-14
Payer: COMMERCIAL

## 2022-09-14 ENCOUNTER — TELEPHONE (OUTPATIENT)
Dept: ADMINISTRATIVE | Facility: OTHER | Age: 42
End: 2022-09-14

## 2022-09-14 VITALS
DIASTOLIC BLOOD PRESSURE: 82 MMHG | TEMPERATURE: 97.8 F | HEIGHT: 65 IN | BODY MASS INDEX: 29.82 KG/M2 | WEIGHT: 179 LBS | SYSTOLIC BLOOD PRESSURE: 118 MMHG | HEART RATE: 96 BPM

## 2022-09-14 DIAGNOSIS — Z13.0 SCREENING FOR DEFICIENCY ANEMIA: ICD-10-CM

## 2022-09-14 DIAGNOSIS — Z11.59 NEED FOR HEPATITIS C SCREENING TEST: ICD-10-CM

## 2022-09-14 DIAGNOSIS — Z11.4 SCREENING FOR HIV (HUMAN IMMUNODEFICIENCY VIRUS): ICD-10-CM

## 2022-09-14 DIAGNOSIS — M54.32 NEURALGIA OF LEFT SCIATIC NERVE: ICD-10-CM

## 2022-09-14 DIAGNOSIS — Z13.29 SCREENING FOR THYROID DISORDER: ICD-10-CM

## 2022-09-14 DIAGNOSIS — Z13.1 SCREENING FOR DIABETES MELLITUS: ICD-10-CM

## 2022-09-14 DIAGNOSIS — Z13.220 SCREENING FOR LIPID DISORDERS: ICD-10-CM

## 2022-09-14 DIAGNOSIS — F90.1 ATTENTION DEFICIT HYPERACTIVITY DISORDER (ADHD), PREDOMINANTLY HYPERACTIVE TYPE: Primary | ICD-10-CM

## 2022-09-14 DIAGNOSIS — F41.9 ANXIETY: ICD-10-CM

## 2022-09-14 DIAGNOSIS — Z00.00 PREVENTATIVE HEALTH CARE: ICD-10-CM

## 2022-09-14 PROCEDURE — 99214 OFFICE O/P EST MOD 30 MIN: CPT | Performed by: NURSE PRACTITIONER

## 2022-09-14 NOTE — TELEPHONE ENCOUNTER
Lilliam Pedroza called and stated that the vyvance that you just prescribed it way too expensive  It would cost her over $300 for 100 pills  She cannot afford that, I advised her to contact the company that makes that to see if they have a discount card  They do not, but they stated that there is something that will take off $60 of the price  However the pharmacy will not use that discount because it is a controlled substance  So at once point you recommend to take 2 adderall in the morning and 2 adderall in the afternoon  However, she did not remember that and she never took that dosage  Should she try that?  Please advise    [facility] [place]

## 2022-09-14 NOTE — TELEPHONE ENCOUNTER
Yes have her try the adderall first and we can discuss in 2 weeks  If no improvement, there are other options too  So no need to pay that much for vyvanse

## 2022-09-14 NOTE — LETTER
Procedure Request Form: Mammogram      Date Requested: 22  Patient: Paris Kenning  Patient : 1980   Referring Provider: Sushma Cable, CRNP        Date of Procedure ______________________________       The above patient has informed us that they have completed their   most recent Mammogram at your facility  Please complete   this form and attach all corresponding procedure reports/results  Comments __________________________________________________________  ____________________________________________________________________  ____________________________________________________________________  ____________________________________________________________________    Facility Completing Procedure _________________________________________    Form Completed By (print name) _______________________________________      Signature __________________________________________________________      These reports are needed for  compliance  Please fax this completed form and a copy of the procedure report to our office located at [place] [address] as soon as possible to [phone] attention Nori Ahumada: Phone [phone]    We thank you for your assistance in treating our mutual patient

## 2022-09-14 NOTE — TELEPHONE ENCOUNTER
----- Message from Nhan Matos sent at 9/14/2022 10:03 AM EDT -----  Regarding: Mammo  09/14/22 10:03 AM    Hello, our patient attached above has had Mammogram completed/performed  Please assist in updating the patient chart by making an External outreach to [address] [place] [address] located in [place], [place]  The date of service is 2021      Thank you,  [address] [place] [address]

## 2022-09-14 NOTE — TELEPHONE ENCOUNTER
Upon review of the In Basket request and the patient's chart, initial outreach has been made via fax, please see Contacts section for details       Thank you  Morirs Dangelo

## 2022-09-14 NOTE — PROGRESS NOTES
Assessment/Plan:    1  Attention deficit hyperactivity disorder (ADHD), predominantly hyperactive type  -     lisdexamfetamine (Vyvanse) 30 MG capsule; Take 1 capsule (30 mg total) by mouth every morning Max Daily Amount: 30 mg    2  Neuralgia of left sciatic nerve  Assessment & Plan:  No improvement on Cymbalta  Recommend taper off of medication  3  Anxiety  Assessment & Plan:  Symptoms currently increased  4  Need for hepatitis C screening test  -     Hepatitis C Antibody ([facility]); Future  -     [facility]- Human Immunodeficiency Virus 1/2 Antigen / Antibody ( Fourth Generation) with Reflex Testing; Future  -     Hepatitis C Antibody ([facility])    5  Screening for HIV (human immunodeficiency virus)    6  Preventative health care  -     CBC and differential; Future  -     Comprehensive metabolic panel; Future  -     TSH, 3rd generation; Future  -     Lipid panel; Future  -     Hepatitis C Antibody ([facility]); Future  -     [facility]- Human Immunodeficiency Virus 1/2 Antigen / Antibody ( Fourth Generation) with Reflex Testing; Future  -     CBC and differential  -     Comprehensive metabolic panel  -     TSH, 3rd generation  -     Lipid panel  -     Hepatitis C Antibody ([facility])    7  Screening for deficiency anemia  -     CBC and differential; Future  -     CBC and differential    8  Screening for diabetes mellitus  -     Comprehensive metabolic panel; Future  -     Comprehensive metabolic panel    9  Screening for thyroid disorder  -     TSH, 3rd generation; Future  -     TSH, 3rd generation    10  Screening for lipid disorders  -     Lipid panel; Future  -     Lipid panel          Return in about 2 weeks (around 9/28/2022) for Recheck  Subjective:      Patient ID: Chemehuevi Jason is a 38 y o  female      Chief Complaint   Patient presents with   Damion Parker [facility] is a 38year old female who with depression, anxiety, ADHD who presents to the office to discuss medication management  Reports she is having increased anxiety symptoms r/t her kids being away from home at   Reports afternoon fatigue  The following portions of the patient's history were reviewed and updated as appropriate: allergies, current medications, past family history, past medical history, past social history, past surgical history and problem list     Review of Systems   Constitutional: Positive for fatigue  Respiratory: Negative for cough and shortness of breath  Cardiovascular: Negative for chest pain  Psychiatric/Behavioral: Negative for sleep disturbance  The patient is nervous/anxious  Current Outpatient Medications   Medication Sig Dispense Refill    ALPRAZolam (XANAX) 0 25 mg tablet Take 1 tablet (0 25 mg total) by mouth daily at bedtime as needed for anxiety 30 tablet 0    fluticasone (FLONASE) 50 mcg/act nasal spray 1 spray into each nostril daily      VAHE FE 1 5/30 1 5-30 MG-MCG tablet   0    lisdexamfetamine (Vyvanse) 30 MG capsule Take 1 capsule (30 mg total) by mouth every morning Max Daily Amount: 30 mg 30 capsule 0    multivitamin (THERAGRAN) TABS Take 1 tablet by mouth daily      sertraline (ZOLOFT) 100 mg tablet Take 1 tablet (100 mg total) by mouth 2 (two) times a day 60 tablet 2     No current facility-administered medications for this visit  Objective:    /82 (BP Location: Left arm, Patient Position: Sitting, Cuff Size: Extra-Large)   Pulse 96   Temp 97 8 °F (36 6 °C) (Temporal)   Ht 5' 4 5" (1 638 m)   Wt 81 2 kg (179 lb)   LMP 09/07/2022 (Approximate)   BMI 30 25 kg/m²        Physical Exam  Vitals reviewed  Constitutional:       Appearance: Normal appearance  HENT:      Head: Normocephalic and atraumatic  Cardiovascular:      Rate and Rhythm: Regular rhythm  Heart sounds: Normal heart sounds     Neurological:      Mental Status: She is alert and oriented to person, place, and time     Psychiatric:         Mood and Affect: Mood normal                 MEJIA Sanchez

## 2022-09-15 NOTE — TELEPHONE ENCOUNTER
Upon review of the In Basket request we were able to locate, review, and update the patient chart as requested for Mammogram     Any additional questions or concerns should be emailed to the Practice Liaisons via [email or web address] email, please do not reply via In Basket      Thank you  Tea Biggs

## 2022-09-16 DIAGNOSIS — F90.1 ATTENTION DEFICIT HYPERACTIVITY DISORDER (ADHD), PREDOMINANTLY HYPERACTIVE TYPE: Primary | ICD-10-CM

## 2022-09-16 DIAGNOSIS — F41.9 ANXIETY: ICD-10-CM

## 2022-09-16 DIAGNOSIS — F41.0 PANIC ATTACKS: ICD-10-CM

## 2022-09-16 RX ORDER — DEXTROAMPHETAMINE SACCHARATE, AMPHETAMINE ASPARTATE, DEXTROAMPHETAMINE SULFATE AND AMPHETAMINE SULFATE 5; 5; 5; 5 MG/1; MG/1; MG/1; MG/1
20 TABLET ORAL
COMMUNITY
End: 2022-09-16 | Stop reason: SDUPTHER

## 2022-09-16 RX ORDER — DEXTROAMPHETAMINE SACCHARATE, AMPHETAMINE ASPARTATE, DEXTROAMPHETAMINE SULFATE AND AMPHETAMINE SULFATE 5; 5; 5; 5 MG/1; MG/1; MG/1; MG/1
20 TABLET ORAL
Qty: 60 TABLET | Refills: 0 | Status: SHIPPED | OUTPATIENT
Start: 2022-09-16 | End: 2022-10-13 | Stop reason: SDUPTHER

## 2022-09-16 RX ORDER — ALPRAZOLAM 0.25 MG/1
0.25 TABLET ORAL
Qty: 30 TABLET | Refills: 0 | Status: SHIPPED | OUTPATIENT
Start: 2022-09-16 | End: 2022-10-12 | Stop reason: SDUPTHER

## 2022-09-23 ENCOUNTER — TELEPHONE (OUTPATIENT)
Dept: PSYCHIATRY | Facility: CLINIC | Age: 42
End: 2022-09-23

## 2022-09-23 NOTE — TELEPHONE ENCOUNTER
Behavorial Health Outpatient Intake Questions    Referred by: Self     Please advised interviewee that they need to answer all questions truthfully to allow for best care and any misrepresentations of information may affect their ability to be seen at this clinic   => Was this discussed? Yes     [facility] -     Presenting Problem (in patient's words):   Childhood Family Issues which extended into adulthood   Are there any developmental disabilities? ? If yes, can they speak to you on the phone? If they are too limited to speak to you on phone, refer out Yes, ADHD and OCD    Are you taking any psychiatric medications? Yes    => If yes, who prescribes? If yes, are they injectable medications? Zoloft, Adderral, Xanax; PCP   Does the patient have a language barrier or hearing impairment? No    Have you been treated at [facility] by a therapist or a doctor in the past? If yes, who? Yes,PCP    Has the patient been hospitalized for mental health? No   If yes, how long ago was last hospitalization and where was it? Do you actively use alcohol or marijuana or illegal substances? If yes, what and how much - refer out to Drug and alcohol treatment if use is excessive or daily use of illegal substances No concerns of substance abuse are reported  Do you have a community treatment team or ? No    Legal History-     Does the patient have any history of arrests, group home/long-term time, or DUIs? No  If Yes-  1) What types of charges? 2) When were they last incarcerated? 3) Are they currently on parole or probation? Minor Child-    Who has custody of the child? Is there a custody agreement? If there is a custody agreement remind parent that they must bring a copy to the first appt or they will not be seen       Intake Team, please check with provider before scheduling if flags come up such as:  - complex case  - legal history (other than DUI)  - communication barrier concerns are present  - if, in your judgment, this needs further review    ACCEPTED as a patient Yes  => Appointment Date: September 29, 2022 at 10:00 with Sg Dorsey     Referred Elsewhere? No  VV pt will be doing appt on laptop  Name of Insurance Co: Dax Lovelace of [address] [place] [address] ID# [id number]  Insurance Phone #  If ins is primary or secondary  If patient is a minor, parents information such as Name, D  O B of guarantor

## 2022-09-28 ENCOUNTER — TELEMEDICINE (OUTPATIENT)
Dept: FAMILY MEDICINE CLINIC | Facility: CLINIC | Age: 42
End: 2022-09-28
Payer: COMMERCIAL

## 2022-09-28 VITALS — BODY MASS INDEX: 29.82 KG/M2 | WEIGHT: 179 LBS | HEIGHT: 65 IN

## 2022-09-28 DIAGNOSIS — F41.9 ANXIETY: ICD-10-CM

## 2022-09-28 DIAGNOSIS — F90.1 ATTENTION DEFICIT HYPERACTIVITY DISORDER (ADHD), PREDOMINANTLY HYPERACTIVE TYPE: Primary | ICD-10-CM

## 2022-09-28 PROCEDURE — 99213 OFFICE O/P EST LOW 20 MIN: CPT | Performed by: NURSE PRACTITIONER

## 2022-09-28 NOTE — PROGRESS NOTES
Virtual Regular Visit    Verification of patient location:    Patient is located in the following state in which I hold an active license [place]      Assessment/Plan:    Problem List Items Addressed This Visit        Other    Anxiety    Attention deficit hyperactivity disorder (ADHD), predominantly hyperactive type - Primary        Pt feels improved overall  Recheck 3 months   Continue medication dosing as directed  Pt is scheduled for follow up with [address] [place] [address] tomorrow  Reason for visit is   Chief Complaint   Patient presents with    Follow-up     Pt here for 2 week recheck    Virtual Regular Visit        Encounter provider MEJIA Boss    Provider located at [address] [place] [address]  [id number]      Recent Visits  No visits were found meeting these conditions  Showing recent visits within past 7 days and meeting all other requirements  Today's Visits  Date Type Provider Dept   09/28/22 Telemedicine Lauren Griffin [facility]   Showing today's visits and meeting all other requirements  Future Appointments  No visits were found meeting these conditions  Showing future appointments within next 150 days and meeting all other requirements       The patient was identified by name and date of birth  Fili Rosenberg was informed that this is a telemedicine visit and that the visit is being conducted through [address] [place] [address] Now and patient was informed that this is a secure, HIPAA-compliant platform  She agrees to proceed     My office door was closed  No one else was in the room  She acknowledged consent and understanding of privacy and security of the video platform  The patient has agreed to participate and understands they can discontinue the visit at any time  Patient is aware this is a billable service  Subjective  Fili Rosenbegr is a 38 y o  female who is scheduled for a 2 week recheck   Reports that she has been doing well overall  Reports she has stopped taking cymbalta without any issues  Past Medical History:   Diagnosis Date    Anxiety 2020    Attention deficit hyperactivity disorder (ADHD), predominantly hyperactive type 3/10/2022    Lumbar degenerative disc disease 3/10/2022    Patient denies medical problems        Past Surgical History:   Procedure Laterality Date     SECTION         Current Outpatient Medications   Medication Sig Dispense Refill    ALPRAZolam (XANAX) 0 25 mg tablet Take 1 tablet (0 25 mg total) by mouth daily at bedtime as needed for anxiety 30 tablet 0    amphetamine-dextroamphetamine (ADDERALL) 20 mg tablet Take 1 tablet (20 mg total) by mouth 2 (two) times a day Max Daily Amount: 40 mg 60 tablet 0    fluticasone (FLONASE) 50 mcg/act nasal spray 1 spray into each nostril daily      VAHE FE 1  1 5-30 MG-MCG tablet   0    multivitamin (THERAGRAN) TABS Take 1 tablet by mouth daily      sertraline (ZOLOFT) 100 mg tablet Take 1 tablet (100 mg total) by mouth 2 (two) times a day 60 tablet 2     No current facility-administered medications for this visit  No Known Allergies    Review of Systems   Respiratory: Negative for chest tightness and shortness of breath  Cardiovascular: Negative for chest pain  Psychiatric/Behavioral: The patient is nervous/anxious  Video Exam    Vitals:    22 1328   Weight: 81 2 kg (179 lb)   Height: 5' 4 5" (1 638 m)       Physical Exam  Vitals reviewed  Constitutional:       Appearance: Normal appearance  HENT:      Head: Normocephalic and atraumatic  Neurological:      Mental Status: She is alert and oriented to person, place, and time     Psychiatric:         Mood and Affect: Mood normal           I spent 15 minutes directly with the patient during this visit

## 2022-10-12 DIAGNOSIS — F90.1 ATTENTION DEFICIT HYPERACTIVITY DISORDER (ADHD), PREDOMINANTLY HYPERACTIVE TYPE: ICD-10-CM

## 2022-10-12 DIAGNOSIS — F41.9 ANXIETY: ICD-10-CM

## 2022-10-12 DIAGNOSIS — F41.0 PANIC ATTACKS: ICD-10-CM

## 2022-10-12 RX ORDER — DEXTROAMPHETAMINE SACCHARATE, AMPHETAMINE ASPARTATE, DEXTROAMPHETAMINE SULFATE AND AMPHETAMINE SULFATE 5; 5; 5; 5 MG/1; MG/1; MG/1; MG/1
20 TABLET ORAL
Qty: 60 TABLET | Refills: 0 | Status: CANCELLED | OUTPATIENT
Start: 2022-10-12

## 2022-10-13 ENCOUNTER — TELEMEDICINE (OUTPATIENT)
Dept: FAMILY MEDICINE CLINIC | Facility: CLINIC | Age: 42
End: 2022-10-13
Payer: COMMERCIAL

## 2022-10-13 VITALS — HEIGHT: 65 IN | WEIGHT: 179 LBS | BODY MASS INDEX: 29.82 KG/M2

## 2022-10-13 DIAGNOSIS — F90.1 ATTENTION DEFICIT HYPERACTIVITY DISORDER (ADHD), PREDOMINANTLY HYPERACTIVE TYPE: ICD-10-CM

## 2022-10-13 DIAGNOSIS — F41.0 PANIC ATTACKS: ICD-10-CM

## 2022-10-13 DIAGNOSIS — F41.1 GENERALIZED ANXIETY DISORDER: Primary | ICD-10-CM

## 2022-10-13 PROCEDURE — 99214 OFFICE O/P EST MOD 30 MIN: CPT | Performed by: NURSE PRACTITIONER

## 2022-10-13 RX ORDER — PAROXETINE HYDROCHLORIDE 20 MG/1
20 TABLET, FILM COATED ORAL DAILY
Qty: 90 TABLET | Refills: 0 | Status: SHIPPED | OUTPATIENT
Start: 2022-10-13

## 2022-10-13 RX ORDER — DEXTROAMPHETAMINE SACCHARATE, AMPHETAMINE ASPARTATE, DEXTROAMPHETAMINE SULFATE AND AMPHETAMINE SULFATE 5; 5; 5; 5 MG/1; MG/1; MG/1; MG/1
20 TABLET ORAL
Qty: 60 TABLET | Refills: 0 | Status: SHIPPED | OUTPATIENT
Start: 2022-10-13 | End: 2022-10-17 | Stop reason: SDUPTHER

## 2022-10-14 RX ORDER — ALPRAZOLAM 0.25 MG/1
0.25 TABLET ORAL
Qty: 30 TABLET | Refills: 0 | Status: SHIPPED | OUTPATIENT
Start: 2022-10-14

## 2022-10-17 ENCOUNTER — TELEPHONE (OUTPATIENT)
Dept: FAMILY MEDICINE CLINIC | Facility: CLINIC | Age: 42
End: 2022-10-17

## 2022-10-17 PROBLEM — F41.1 GENERALIZED ANXIETY DISORDER: Status: ACTIVE | Noted: 2022-10-17

## 2022-10-17 RX ORDER — DEXTROAMPHETAMINE SACCHARATE, AMPHETAMINE ASPARTATE, DEXTROAMPHETAMINE SULFATE AND AMPHETAMINE SULFATE 5; 5; 5; 5 MG/1; MG/1; MG/1; MG/1
20 TABLET ORAL
Qty: 60 TABLET | Refills: 0 | Status: SHIPPED | OUTPATIENT
Start: 2022-10-17

## 2022-10-17 NOTE — TELEPHONE ENCOUNTER
Kelsey Colon states Adderral is not available at [facility]  [facility] it    Please call into [facility]

## 2022-10-17 NOTE — PROGRESS NOTES
Virtual Regular Visit    Verification of patient location:    Patient is located in the following state in which I hold an active license [place]      Assessment/Plan:    Problem List Items Addressed This Visit        Other    Attention deficit hyperactivity disorder (ADHD), predominantly hyperactive type    Relevant Medications    PARoxetine (PAXIL) 20 mg tablet    amphetamine-dextroamphetamine (ADDERALL) 20 mg tablet    Generalized anxiety disorder - Primary     Recommend changing from sertraline to paxil  Taper sertraline as discussed and start paxil 10 mg x's 5 days then increase to 20 mg daily  Recheck in about 6-8 weeks  Sooner PRN  Relevant Medications    PARoxetine (PAXIL) 20 mg tablet    amphetamine-dextroamphetamine (ADDERALL) 20 mg tablet      Other Visit Diagnoses     Panic attacks        Relevant Medications    PARoxetine (PAXIL) 20 mg tablet               Reason for visit is   Chief Complaint   Patient presents with   • Medication Management     Pt here for med check   • Virtual Regular Visit        Encounter provider Karlos Rao, [facility]    Provider located at [address] [place] [id number]      Recent Visits  Date Type Provider Dept   10/13/22 [address] [facility], [address] [place] [address] Physicians   Showing recent visits within past 7 days and meeting all other requirements  Today's Visits  Date Type Provider Dept   10/17/22 Telephone [address] [facility] [address] Physicians   Showing today's visits and meeting all other requirements  Future Appointments  No visits were found meeting these conditions  Showing future appointments within next 150 days and meeting all other requirements       The patient was identified by name and date of birth   Bonnie Marcelino was informed that this is a telemedicine visit and that the visit is being conducted through [address] [place] [address] Now and patient was informed that this is a secure, HIPAA-compliant platform  She agrees to proceed     My office door was closed  No one else was in the room  She acknowledged consent and understanding of privacy and security of the video platform  The patient has agreed to participate and understands they can discontinue the visit at any time  Patient is aware this is a billable service  Mamie Diez is a 38year old female with attention deficit and generalized anxiety disorder who is scheduled for discussion of medications  Reports that she had a very rough day  Continues to have periods of depression and anxiety with panic attacks  Pt reports that she takes alprazolam periodically, but has not noted any improvement overall  Reports she has been having a hard day r/t not being able to get into counseling  Reports that she has been rescheduled x's 2  Past Medical History:   Diagnosis Date   • Anxiety 2020   • Attention deficit hyperactivity disorder (ADHD), predominantly hyperactive type 3/10/2022   • Lumbar degenerative disc disease 3/10/2022   • Patient denies medical problems        Past Surgical History:   Procedure Laterality Date   •  SECTION         Current Outpatient Medications   Medication Sig Dispense Refill   • amphetamine-dextroamphetamine (ADDERALL) 20 mg tablet Take 1 tablet (20 mg total) by mouth 2 (two) times a day Max Daily Amount: 40 mg 60 tablet 0   • fluticasone (FLONASE) 50 mcg/act nasal spray 1 spray into each nostril daily     • VAHE FE 1  1 5-30 MG-MCG tablet   0   • multivitamin (THERAGRAN) TABS Take 1 tablet by mouth daily     • PARoxetine (PAXIL) 20 mg tablet Take 1 tablet (20 mg total) by mouth daily 90 tablet 0   • ALPRAZolam (XANAX) 0 25 mg tablet Take 1 tablet (0 25 mg total) by mouth daily at bedtime as needed for anxiety 30 tablet 0     No current facility-administered medications for this visit          No Known Allergies    Review of Systems   Constitutional: Negative for chills, fatigue and fever  Respiratory: Negative for cough, chest tightness and shortness of breath  Cardiovascular: Negative for chest pain, palpitations and leg swelling  Psychiatric/Behavioral: Positive for agitation and sleep disturbance  Negative for self-injury and suicidal ideas  The patient is nervous/anxious  Video Exam    Vitals:    10/13/22 1339   Weight: 81 2 kg (179 lb)   Height: 5' 4 5" (1 638 m)       Physical Exam  Vitals reviewed  Constitutional:       Appearance: Normal appearance  HENT:      Head: Normocephalic and atraumatic  Neurological:      Mental Status: She is alert and oriented to person, place, and time     Psychiatric:         Mood and Affect: Mood normal           I spent 30 minutes directly with the patient during this visit

## 2022-10-17 NOTE — ASSESSMENT & PLAN NOTE
Recommend changing from sertraline to paxil  Taper sertraline as discussed and start paxil 10 mg x's 5 days then increase to 20 mg daily  Recheck in about 6-8 weeks  Sooner PRN

## 2022-10-27 ENCOUNTER — TELEMEDICINE (OUTPATIENT)
Dept: FAMILY MEDICINE CLINIC | Facility: CLINIC | Age: 42
End: 2022-10-27

## 2022-10-27 VITALS — WEIGHT: 179 LBS | HEIGHT: 65 IN | BODY MASS INDEX: 29.82 KG/M2

## 2022-10-27 DIAGNOSIS — F90.1 ATTENTION DEFICIT HYPERACTIVITY DISORDER (ADHD), PREDOMINANTLY HYPERACTIVE TYPE: ICD-10-CM

## 2022-10-27 DIAGNOSIS — F41.9 ANXIETY: ICD-10-CM

## 2022-10-27 DIAGNOSIS — F41.1 GENERALIZED ANXIETY DISORDER: Primary | ICD-10-CM

## 2022-10-27 NOTE — PROGRESS NOTES
Virtual Regular Visit    Verification of patient location:    Patient is located in the following state in which I hold an active license [place]      Assessment/Plan:    Problem List Items Addressed This Visit        Other    Anxiety     Periodic use of alprazolam for symptoms  Advise paxil, continue at prescribed dose  May take 2 tablets of alprazolam PRN severe anxiety         Attention deficit hyperactivity disorder (ADHD), predominantly hyperactive type    Generalized anxiety disorder - Primary     Advise that pt allow more time for paxil to start working  Strongly recommend counseling for childhood trauma/interpersoal relationship issues with parents  Pt is doing well overall and encourage self care and healthy coping  Reason for visit is   Chief Complaint   Patient presents with   • Medication Management   • Virtual Brief Visit   • Virtual Regular Visit        Encounter provider MEJIA Smith    Provider located at [address] [place] [address]  [id number]      Recent Visits  Date Type Provider Dept   10/27/22 [address] [place] [address], [address] [address] Physicians   Showing recent visits within past 7 days and meeting all other requirements  Future Appointments  No visits were found meeting these conditions  Showing future appointments within next 150 days and meeting all other requirements       The patient was identified by name and date of birth  Brower Eriberto was informed that this is a telemedicine visit and that the visit is being conducted through the [address] [place] [address] Now platform  She agrees to proceed     My office door was closed  No one else was in the room  She acknowledged consent and understanding of privacy and security of the video platform  The patient has agreed to participate and understands they can discontinue the visit at any time  Patient is aware this is a billable service  Subjective  Kika Fields is a 38 y o  female who is scheduled for a virtual visit to discuss her anxiety and for med check  Pt reports she is not having any s/e's from paxil so far  She is having an issue with ongoing anxiety and anger outbursts  Reports she has periods of crying when she is triggered  States she has been taking alprazolam periodically for her symptoms but is finding it less helpful  Past Medical History:   Diagnosis Date   • Anxiety 2020   • Attention deficit hyperactivity disorder (ADHD), predominantly hyperactive type 3/10/2022   • Lumbar degenerative disc disease 3/10/2022   • Patient denies medical problems    • Stress headaches 2020       Past Surgical History:   Procedure Laterality Date   •  SECTION         Current Outpatient Medications   Medication Sig Dispense Refill   • ALPRAZolam (XANAX) 0 25 mg tablet Take 1 tablet (0 25 mg total) by mouth daily at bedtime as needed for anxiety 30 tablet 0   • amphetamine-dextroamphetamine (ADDERALL) 20 mg tablet Take 1 tablet (20 mg total) by mouth 2 (two) times a day Max Daily Amount: 40 mg 60 tablet 0   • fluticasone (FLONASE) 50 mcg/act nasal spray 1 spray into each nostril daily     • VAHE FE 1  1 5-30 MG-MCG tablet   0   • multivitamin (THERAGRAN) TABS Take 1 tablet by mouth daily     • PARoxetine (PAXIL) 20 mg tablet Take 1 tablet (20 mg total) by mouth daily 90 tablet 0   • benzonatate (TESSALON) 200 MG capsule Take 1 capsule (200 mg total) by mouth 3 (three) times a day as needed for cough 20 capsule 0     No current facility-administered medications for this visit  No Known Allergies    Review of Systems   Constitutional: Negative for chills, fatigue and fever  Respiratory: Negative for cough, chest tightness and shortness of breath  Cardiovascular: Negative for chest pain  Psychiatric/Behavioral: Negative for self-injury and suicidal ideas  The patient is nervous/anxious          Video Exam    Vitals:    10/27/22 1054   Weight: 81 2 kg (179 lb)   Height: 5' 4 5" (1 638 m)       Physical Exam  Vitals reviewed  Constitutional:       Appearance: Normal appearance  HENT:      Head: Normocephalic and atraumatic  Neurological:      Mental Status: She is alert and oriented to person, place, and time  Psychiatric:         Mood and Affect: Mood normal  Affect is tearful            I spent 40 minutes directly with the patient during this visit

## 2022-10-30 ENCOUNTER — AMB VIDEO VISIT (OUTPATIENT)
Dept: OTHER | Facility: HOSPITAL | Age: 42
End: 2022-10-30

## 2022-10-30 DIAGNOSIS — J06.9 VIRAL URI: Primary | ICD-10-CM

## 2022-10-30 PROBLEM — E78.2 MIXED HYPERLIPIDEMIA: Status: RESOLVED | Noted: 2021-01-13 | Resolved: 2022-10-30

## 2022-10-30 PROBLEM — F45.41 STRESS HEADACHES: Status: RESOLVED | Noted: 2020-12-08 | Resolved: 2022-10-30

## 2022-10-30 RX ORDER — BENZONATATE 200 MG/1
200 CAPSULE ORAL 3 TIMES DAILY PRN
Qty: 20 CAPSULE | Refills: 0 | Status: SHIPPED | OUTPATIENT
Start: 2022-10-30

## 2022-10-30 NOTE — PROGRESS NOTES
Video Visit - Jolly Pastor 38 y o  female MRN: [id number]    REQUIRED DOCUMENTATION:         1  [facility]  2  Provider located at [address] [place] [id number] [phone]  3  [facility] provider: Carlita Meyer PA-C   4  Identify all parties in room with patient during [facility] visit:  No one else  5  After connecting through [employer], patient was identified by name and date of birth  Patient was then informed that this was a Telemedicine visit and that the exam was being conducted confidentially over secure lines  My office door was closed  No one else was in the room  Patient acknowledged consent and understanding of privacy and security of the Telemedicine visit  I informed the patient that I have reviewed their record in Epic and presented the opportunity for them to ask any questions regarding the visit today  The patient agreed to participate  VITALS: Heart Rate: 78 BPM, Respiratory Rate: 12 RPM, Temperature 98 8° F, Blood Pressure Unavailable mmHg, Pulse Ox unavailable % on RA    HPI  Pt reports sudden onset illness beginning yesterday  Tmax 103 5, throat pain radiating into sinuses and ears, fatigue, body aches, chest pain with coughing  Tried guasha stone facial massage, advil cold and sinus, nyquil without relief, oscillium, theraflu  COVID negative x 2  Denies sick contacts  Had Saritha in August  Offerred covid/flu/rsv testing, tamiflu which patient declined after risks vs benefits discussion  Physical Exam  Constitutional:       General: She is not in acute distress  Appearance: Normal appearance  She is ill-appearing (mild, laying in bed)  She is not toxic-appearing  Comments: pleasant   HENT:      Head: Normocephalic and atraumatic  Nose: No rhinorrhea        Mouth/Throat:      Mouth: Mucous membranes are moist    Eyes:      Conjunctiva/sclera: Conjunctivae normal    Cardiovascular:      Rate and Rhythm: Normal rate    Pulmonary:      Effort: Pulmonary effort is normal  No respiratory distress  Breath sounds: No wheezing (no gross audible wheeze through computer)  Musculoskeletal:      Cervical back: Normal range of motion  Skin:     Findings: No rash (on face or neck)  Neurological:      Mental Status: She is alert  Cranial Nerves: No dysarthria or facial asymmetry  Psychiatric:         Mood and Affect: Mood normal          Behavior: Behavior normal          Diagnoses and all orders for this visit:    Viral URI  -     benzonatate (TESSALON) 200 MG capsule; Take 1 capsule (200 mg total) by mouth 3 (three) times a day as needed for cough      Patient Instructions   You likely have a virus such as the flu or a cold  You can take tylenol (acetaminophen 500 or 650 mg every 6 hours) and ibuprofen (600 mg every 6 hours) as needed for fevers/headaches/body aches  Be sure to drink plenty of water  You can alternate these every 3 hours to get constant fever/pain relief  Over-the-counter medications such as cough suppressants (dextromethorphan), mucolytics (mucinex to help thin/loosen mucous), throat sprays, decongestants (pseudoephedrine or phenylephrine), nasal sprays (flonase, or afrin up to 3 days) can be taken as needed for symptom relief  Please schedule a follow-up with your PCP within the next 2 days   Go to the ER for new worsening or concerning symptoms including but not limited to shortness of breath or chest pain

## 2022-10-30 NOTE — PATIENT INSTRUCTIONS
You likely have a virus such as the flu or a cold  You can take tylenol (acetaminophen 500 or 650 mg every 6 hours) and ibuprofen (600 mg every 6 hours) as needed for fevers/headaches/body aches  Be sure to drink plenty of water  You can alternate these every 3 hours to get constant fever/pain relief  Over-the-counter medications such as cough suppressants (dextromethorphan), mucolytics (mucinex to help thin/loosen mucous), throat sprays, decongestants (pseudoephedrine or phenylephrine), nasal sprays (flonase, or afrin up to 3 days) can be taken as needed for symptom relief  Please schedule a follow-up with your PCP within the next 2 days   Go to the ER for new worsening or concerning symptoms including but not limited to shortness of breath or chest pain

## 2022-10-30 NOTE — CARE ANYWHERE EVISITS
Visit Summary for YVONNE GALVAN - Gender: Female - Date of Birth: [id number]  Date: [id number] - Duration: 15 minutes  Patient: Arielle GALVAN  Provider: Margarito Giles PA-C    Patient Contact Information  Address  [address] [place]; [address]  [phone]    Visit Topics  Fever [Added By: Self - 2022-10-30]  Cold [Added By: Self - 2022-10-30]  Headache [Added By: Self - 2022-10-30]  Flu-Like Symptoms [Added By: Self - 2022-10-30]    Triage Questions   What is your current physical address in the event of a medical emergency? Answer []  Are you allergic to any medications? Answer []  Are you now or could you be pregnant? Answer []  Do you have any immune system compromise or chronic lung   disease? Answer []  Do you have any vulnerable family members in the home (infant, pregnant, cancer, elderly)? Answer []     Conversation Transcripts  [0A][0A] [Notification] You are connected with Margarito Giles PA-C, Urgent Care Specialist [0A][Notification] Prasanth Marrufo is located in [place]  [0A][Notification] Prasanth Marrufo has shared health history  Lady Cantu  [0A]    Diagnosis  Acute upper respiratory infection, unspecified    Procedures  Value: 05569 Code: CPT-4 UNLISTED E&M SERVICE    Medications Prescribed    No prescriptions ordered    Electronically signed by: Cassidy Koehler(NPI [id number])

## 2022-10-31 NOTE — ASSESSMENT & PLAN NOTE
Advise that pt allow more time for paxil to start working  Strongly recommend counseling for childhood trauma/interpersoal relationship issues with parents  Pt is doing well overall and encourage self care and healthy coping

## 2022-10-31 NOTE — ASSESSMENT & PLAN NOTE
Periodic use of alprazolam for symptoms  Advise paxil, continue at prescribed dose    May take 2 tablets of alprazolam PRN severe anxiety

## 2022-11-01 ENCOUNTER — OFFICE VISIT (OUTPATIENT)
Dept: FAMILY MEDICINE CLINIC | Facility: CLINIC | Age: 42
End: 2022-11-01

## 2022-11-01 VITALS
DIASTOLIC BLOOD PRESSURE: 80 MMHG | OXYGEN SATURATION: 98 % | TEMPERATURE: 96.4 F | BODY MASS INDEX: 29.49 KG/M2 | SYSTOLIC BLOOD PRESSURE: 124 MMHG | RESPIRATION RATE: 18 BRPM | WEIGHT: 177 LBS | HEART RATE: 78 BPM | HEIGHT: 65 IN

## 2022-11-01 DIAGNOSIS — J01.00 ACUTE NON-RECURRENT MAXILLARY SINUSITIS: Primary | ICD-10-CM

## 2022-11-01 DIAGNOSIS — R50.9 FEVER, UNSPECIFIED FEVER CAUSE: ICD-10-CM

## 2022-11-01 DIAGNOSIS — R68.83 CHILLS: ICD-10-CM

## 2022-11-01 DIAGNOSIS — R05.1 ACUTE COUGH: ICD-10-CM

## 2022-11-01 RX ORDER — AZITHROMYCIN 250 MG/1
TABLET, FILM COATED ORAL
Qty: 6 TABLET | Refills: 0 | Status: SHIPPED | OUTPATIENT
Start: 2022-11-01 | End: 2022-11-05

## 2022-11-01 NOTE — PROGRESS NOTES
Assessment/Plan:    1  Acute non-recurrent maxillary sinusitis  -     azithromycin (ZITHROMAX) 250 mg tablet; Take 2 tablets PO on day one, then take 1 tablet PO daily x's 4 days    2  Fever, unspecified fever cause  -     Covid/Flu- Office Collect    3  Chills  -     Covid/Flu- Office Collect    4  Acute cough  -     Covid/Flu- Office Collect            Patient Instructions   Fluids and rest  May alternate between tylenol and motrin for fever and body aches      Return if symptoms worsen or fail to improve  Subjective:      Patient ID: Carlitos Andrade is a 38 y o  female  Chief Complaint   Patient presents with   • Nasal Congestion   • Cough   • Fever     S/S STARTED 10/29,COVID TEST NEG 11/1     • Headache   • Generalized Body Aches       Cough  This is a new problem  The current episode started in the past 7 days  The problem has been unchanged  The problem occurs every few minutes  The cough is non-productive  Associated symptoms include chills, ear congestion, a fever, myalgias, nasal congestion and sweats  Pertinent negatives include no chest pain, ear pain, headaches, postnasal drip, rash, rhinorrhea, sore throat, shortness of breath or wheezing  Nothing aggravates the symptoms  She has tried OTC cough suppressant for the symptoms  The treatment provided no relief  Her past medical history is significant for pneumonia  The following portions of the patient's history were reviewed and updated as appropriate: allergies, current medications, past family history, past medical history, past social history, past surgical history and problem list     Review of Systems   Constitutional: Positive for chills, diaphoresis, fatigue and fever  HENT: Positive for congestion, sinus pressure and sinus pain  Negative for ear discharge, ear pain, postnasal drip, rhinorrhea and sore throat  Eyes: Negative for pain and discharge  Respiratory: Positive for cough and chest tightness   Negative for shortness of breath and wheezing  Cardiovascular: Negative for chest pain  Gastrointestinal: Negative for diarrhea, nausea and vomiting  Musculoskeletal: Positive for myalgias  Skin: Negative for rash  Neurological: Negative for dizziness and headaches  Hematological: Negative for adenopathy  Current Outpatient Medications   Medication Sig Dispense Refill   • ALPRAZolam (XANAX) 0 25 mg tablet Take 1 tablet (0 25 mg total) by mouth daily at bedtime as needed for anxiety 30 tablet 0   • amphetamine-dextroamphetamine (ADDERALL) 20 mg tablet Take 1 tablet (20 mg total) by mouth 2 (two) times a day Max Daily Amount: 40 mg 60 tablet 0   • azithromycin (ZITHROMAX) 250 mg tablet Take 2 tablets PO on day one, then take 1 tablet PO daily x's 4 days 6 tablet 0   • benzonatate (TESSALON) 200 MG capsule Take 1 capsule (200 mg total) by mouth 3 (three) times a day as needed for cough 20 capsule 0   • fluticasone (FLONASE) 50 mcg/act nasal spray 1 spray into each nostril daily     • VAHE FE 1 5/30 1 5-30 MG-MCG tablet   0   • multivitamin (THERAGRAN) TABS Take 1 tablet by mouth daily     • PARoxetine (PAXIL) 20 mg tablet Take 1 tablet (20 mg total) by mouth daily 90 tablet 0     No current facility-administered medications for this visit  Objective:    /80   Pulse 78   Temp (!) 96 4 °F (35 8 °C) (Temporal)   Resp 18   Ht 5' 4 5" (1 638 m)   Wt 80 3 kg (177 lb)   SpO2 98%   BMI 29 91 kg/m²        Physical Exam  Vitals reviewed  Constitutional:       General: She is not in acute distress  Appearance: Normal appearance  She is well-developed  She is not diaphoretic  HENT:      Head: Normocephalic and atraumatic  Right Ear: Tympanic membrane, ear canal and external ear normal  No drainage, swelling or tenderness  No middle ear effusion  Left Ear: Tympanic membrane, ear canal and external ear normal  No drainage, swelling or tenderness  No middle ear effusion        Nose: Mucosal edema (with erythema) and rhinorrhea present  Right Turbinates: Swollen  Left Turbinates: Swollen  Right Sinus: No maxillary sinus tenderness or frontal sinus tenderness  Left Sinus: Maxillary sinus tenderness present  No frontal sinus tenderness  Mouth/Throat:      Pharynx: Uvula midline  Posterior oropharyngeal erythema present  No oropharyngeal exudate  Tonsils: 2+ on the right  2+ on the left  Eyes:      General:         Right eye: No discharge  Left eye: No discharge  Conjunctiva/sclera: Conjunctivae normal    Neck:      Thyroid: No thyromegaly  Cardiovascular:      Rate and Rhythm: Normal rate and regular rhythm  Heart sounds: Normal heart sounds  Pulmonary:      Effort: Pulmonary effort is normal  No respiratory distress  Breath sounds: Normal breath sounds  No decreased breath sounds, wheezing, rhonchi or rales  Musculoskeletal:      Cervical back: Normal range of motion and neck supple  Lymphadenopathy:      Cervical: Cervical adenopathy present  Right cervical: Superficial cervical adenopathy present  Left cervical: Superficial cervical adenopathy present  Skin:     General: Skin is warm and dry  Findings: No rash  Neurological:      Mental Status: She is alert and oriented to person, place, and time  Psychiatric:         Behavior: Behavior normal          Thought Content:  Thought content normal                 Olvera Sing

## 2022-11-02 LAB
FLUAV RNA RESP QL NAA+PROBE: POSITIVE
FLUBV RNA RESP QL NAA+PROBE: NEGATIVE
SARS-COV-2 RNA RESP QL NAA+PROBE: NEGATIVE

## 2022-11-08 ENCOUNTER — RA CDI HCC (OUTPATIENT)
Dept: OTHER | Facility: HOSPITAL | Age: 42
End: 2022-11-08

## 2022-11-08 NOTE — PROGRESS NOTES
[facility]  coding opportunities       Chart reviewed, no opportunity found: CHART REVIEWED, NO OPPORTUNITY FOUND        Patients Insurance        Commercial Insurance: [employer]

## 2022-11-13 LAB
ALBUMIN SERPL-MCNC: 4.6 G/DL (ref 3.8–4.8)
ALBUMIN/GLOB SERPL: 1.9 {RATIO} (ref 1.2–2.2)
ALP SERPL-CCNC: 65 IU/L (ref 44–121)
ALT SERPL-CCNC: 13 IU/L (ref 0–32)
AST SERPL-CCNC: 23 IU/L (ref 0–40)
BASOPHILS # BLD AUTO: 0.1 X10E3/UL (ref 0–0.2)
BASOPHILS NFR BLD AUTO: 1 %
BILIRUB SERPL-MCNC: 0.3 MG/DL (ref 0–1.2)
BUN SERPL-MCNC: 12 MG/DL (ref 6–24)
BUN/CREAT SERPL: 15 (ref 9–23)
CALCIUM SERPL-MCNC: 9.3 MG/DL (ref 8.7–10.2)
CHLORIDE SERPL-SCNC: 103 MMOL/L (ref 96–106)
CHOLEST SERPL-MCNC: 183 MG/DL (ref 100–199)
CHOLEST/HDLC SERPL: 4.4 RATIO (ref 0–4.4)
CO2 SERPL-SCNC: 24 MMOL/L (ref 20–29)
CREAT SERPL-MCNC: 0.81 MG/DL (ref 0.57–1)
EGFR: 94 ML/MIN/1.73
EOSINOPHIL # BLD AUTO: 0.2 X10E3/UL (ref 0–0.4)
EOSINOPHIL NFR BLD AUTO: 2 %
ERYTHROCYTE [DISTWIDTH] IN BLOOD BY AUTOMATED COUNT: 12.7 % (ref 11.7–15.4)
GLOBULIN SER-MCNC: 2.4 G/DL (ref 1.5–4.5)
GLUCOSE SERPL-MCNC: 86 MG/DL (ref 70–99)
HCT VFR BLD AUTO: 41.1 % (ref 34–46.6)
HCV AB S/CO SERPL IA: <0.1 S/CO RATIO (ref 0–0.9)
HDLC SERPL-MCNC: 42 MG/DL
HGB BLD-MCNC: 13.7 G/DL (ref 11.1–15.9)
HIV 1+2 AB+HIV1 P24 AG SERPL QL IA: NON REACTIVE
IMM GRANULOCYTES # BLD: 0 X10E3/UL (ref 0–0.1)
IMM GRANULOCYTES NFR BLD: 0 %
LDLC SERPL CALC-MCNC: 103 MG/DL (ref 0–99)
LYMPHOCYTES # BLD AUTO: 2.4 X10E3/UL (ref 0.7–3.1)
LYMPHOCYTES NFR BLD AUTO: 35 %
MCH RBC QN AUTO: 28.3 PG (ref 26.6–33)
MCHC RBC AUTO-ENTMCNC: 33.3 G/DL (ref 31.5–35.7)
MCV RBC AUTO: 85 FL (ref 79–97)
MONOCYTES # BLD AUTO: 0.6 X10E3/UL (ref 0.1–0.9)
MONOCYTES NFR BLD AUTO: 9 %
NEUTROPHILS # BLD AUTO: 3.6 X10E3/UL (ref 1.4–7)
NEUTROPHILS NFR BLD AUTO: 53 %
PLATELET # BLD AUTO: 364 X10E3/UL (ref 150–450)
POTASSIUM SERPL-SCNC: 4.6 MMOL/L (ref 3.5–5.2)
PROT SERPL-MCNC: 7 G/DL (ref 6–8.5)
RBC # BLD AUTO: 4.84 X10E6/UL (ref 3.77–5.28)
SL AMB VLDL CHOLESTEROL CALC: 38 MG/DL (ref 5–40)
SODIUM SERPL-SCNC: 140 MMOL/L (ref 134–144)
TRIGL SERPL-MCNC: 220 MG/DL (ref 0–149)
TSH SERPL DL<=0.005 MIU/L-ACNC: 0.91 UIU/ML (ref 0.45–4.5)
WBC # BLD AUTO: 6.8 X10E3/UL (ref 3.4–10.8)

## 2022-11-15 ENCOUNTER — OFFICE VISIT (OUTPATIENT)
Dept: FAMILY MEDICINE CLINIC | Facility: CLINIC | Age: 42
End: 2022-11-15

## 2022-11-15 ENCOUNTER — TELEPHONE (OUTPATIENT)
Dept: FAMILY MEDICINE CLINIC | Facility: CLINIC | Age: 42
End: 2022-11-15

## 2022-11-15 VITALS
RESPIRATION RATE: 12 BRPM | HEIGHT: 65 IN | BODY MASS INDEX: 29.49 KG/M2 | HEART RATE: 76 BPM | OXYGEN SATURATION: 98 % | TEMPERATURE: 98.3 F | WEIGHT: 177 LBS | SYSTOLIC BLOOD PRESSURE: 108 MMHG | DIASTOLIC BLOOD PRESSURE: 72 MMHG

## 2022-11-15 DIAGNOSIS — F41.1 GENERALIZED ANXIETY DISORDER: ICD-10-CM

## 2022-11-15 DIAGNOSIS — E78.2 ELEVATED TRIGLYCERIDES WITH HIGH CHOLESTEROL: ICD-10-CM

## 2022-11-15 DIAGNOSIS — F41.0 PANIC ATTACK: ICD-10-CM

## 2022-11-15 DIAGNOSIS — F41.9 ANXIETY: Primary | ICD-10-CM

## 2022-11-15 DIAGNOSIS — F90.1 ATTENTION DEFICIT HYPERACTIVITY DISORDER (ADHD), PREDOMINANTLY HYPERACTIVE TYPE: ICD-10-CM

## 2022-11-15 DIAGNOSIS — Z00.00 ENCOUNTER FOR ANNUAL GENERAL MEDICAL EXAMINATION WITHOUT ABNORMAL FINDINGS IN ADULT: Primary | ICD-10-CM

## 2022-11-15 DIAGNOSIS — Z23 NEED FOR INFLUENZA VACCINATION: ICD-10-CM

## 2022-11-15 DIAGNOSIS — F33.0 MILD EPISODE OF RECURRENT MAJOR DEPRESSIVE DISORDER (HCC): ICD-10-CM

## 2022-11-15 RX ORDER — PAROXETINE 30 MG/1
30 TABLET, FILM COATED ORAL EVERY MORNING
Qty: 90 TABLET | Refills: 0 | Status: SHIPPED | OUTPATIENT
Start: 2022-11-15

## 2022-11-15 RX ORDER — LORAZEPAM 1 MG/1
1 TABLET ORAL EVERY 8 HOURS PRN
Qty: 30 TABLET | Refills: 0 | Status: SHIPPED | OUTPATIENT
Start: 2022-11-15

## 2022-11-15 NOTE — ASSESSMENT & PLAN NOTE
Pt is currently not taking adderall as she is feeling quite anxious and states she had been feeling a bit more tense  Advise that she remain off of the adderall, continue paxil, start counseling

## 2022-11-15 NOTE — TELEPHONE ENCOUNTER
Ricky Juarez talked to you to today regarding Ativan vs marijuana  So she she tried the second and she stated through lots of giggling, it was not for her  She would like to try the Ativan  Send to [address] [place] [address] in [facility]      She also stated that you were going to send in a new rx for the Paxel and increase it to 30 mgs    There was lots a giggling/laughing on both our parts   :)

## 2022-11-15 NOTE — TELEPHONE ENCOUNTER
Sujit  Well I am glad that she has tried that option  I have called in those medications for her  Recheck in 6 weeks      Christopher Gandhi

## 2022-11-15 NOTE — PROGRESS NOTES
FAMILY [facility] HEALTH MAINTENANCE OFFICE VISIT  [facility] [place] [address] PHYSICIANS    NAME: Jordan Mims  AGE: 38 y o  SEX: female  : 1980     DATE: 11/15/2022    Assessment and Plan     1  Encounter for annual general medical examination without abnormal findings in adult  Comments:  Age appropriate screenings and recommendations discussed  Labs reviewed  2  Attention deficit hyperactivity disorder (ADHD), predominantly hyperactive type  Assessment & Plan:  Pt is currently not taking adderall as she is feeling quite anxious and states she had been feeling a bit more tense  Advise that she remain off of the adderall, continue paxil, start counseling  3  Generalized anxiety disorder  -     Ambulatory Referral to [address] [place] [address]; Future    4  Panic attack  -     Ambulatory Referral to [address] [place] [address]; Future    5  Need for influenza vaccination  -     influenza vaccine, quadrivalent, 0 5 mL, preservative-free, for adult and pediatric patients 6 mos+ (AFLURIA, FLUARIX, FLULAVAL, FLUZONE)    6  Mild episode of recurrent major depressive disorder ([facility] )  -     Ambulatory Referral to [address] [place] [address]; Future    7  Elevated triglycerides with high cholesterol  Assessment & Plan:  Advise exercise and nutrition  Possible medication side effect  Recheck in about 3-6 months           · Patient Counseling:   · Nutrition: Stressed importance of a well balanced diet, moderation of sodium/saturated fat, caloric balance and sufficient intake of fiber  · Exercise: Stressed the importance of regular exercise with a goal of 150 minutes per week  · Dental Health: Discussed daily flossing and brushing and regular dental visits   · Alcohol Use:  Recommended moderation of alcohol intake  · Injury Prevention: Discussed Safety Belts, Safety Helmets, and Smoke Detectors    · Immunizations reviewed: See Orders and Risks and Benefits discussed  · Discussed benefits of:  Mammogram , Cervical Cancer screening and Screening labs  • BMI Counseling: Body mass index is 29 91 kg/m²  Discussed with patient's BMI with her  The BMI is above normal  Exercise recommendations include exercising 3-5 times per week  Return in about 6 weeks (around 2022) for Recheck  Chief Complaint     Chief Complaint   Patient presents with   • Annual Exam       History of Present Illness     HPI    Well Adult Physical   Patient here for a comprehensive physical exam       Diet and Physical Activity  Diet: well balanced diet  Exercise: intermittently      Depression Screen  PHQ-2/9 Depression Screening    Little interest or pleasure in doing things: 1 - several days  Feeling down, depressed, or hopeless: 3 - nearly every day  Trouble falling or staying asleep, or sleeping too much: 0 - not at all  Feeling tired or having little energy: 1 - several days  Poor appetite or overeatin - several days  Feeling bad about yourself - or that you are a failure or have let yourself or your family down: 2 - more than half the days  Trouble concentrating on things, such as reading the newspaper or watching television: 3 - nearly every day  Moving or speaking so slowly that other people could have noticed   Or the opposite - being so fidgety or restless that you have been moving around a lot more than usual: 2 - more than half the days  Thoughts that you would be better off dead, or of hurting yourself in some way: 0 - not at all  PHQ-2 Score: 4  PHQ-2 Interpretation: POSITIVE depression screen  PHQ-9 Score: 13   PHQ-9 Interpretation: Moderate depression           General Health  Hearing: Normal:  bilateral  Vision: goes for regular eye exams  Dental: regular dental visits    Reproductive Health  No issues       The following portions of the patient's history were reviewed and updated as appropriate: allergies, current medications, past family history, past medical history, past social history, past surgical history and problem list     Review of Systems     Review of Systems   Constitutional: Negative for diaphoresis, fatigue and fever  HENT: Negative for ear pain and hearing loss  Eyes: Negative for pain and visual disturbance  Respiratory: Negative for chest tightness and shortness of breath  Cardiovascular: Negative for chest pain, palpitations and leg swelling  Gastrointestinal: Negative for abdominal pain, constipation and diarrhea  Genitourinary: Negative for difficulty urinating  Musculoskeletal: Negative for arthralgias and myalgias  Skin: Negative for rash  Neurological: Negative for dizziness, numbness and headaches  Psychiatric/Behavioral: Negative for sleep disturbance         Past Medical History     Past Medical History:   Diagnosis Date   • Anxiety 2020   • Attention deficit hyperactivity disorder (ADHD), predominantly hyperactive type 3/10/2022   • Lumbar degenerative disc disease 3/10/2022   • Patient denies medical problems    • Stress headaches 2020       Past Surgical History     Past Surgical History:   Procedure Laterality Date   •  SECTION         Social History     Social History     Socioeconomic History   • Marital status: /Civil Union     Spouse name: None   • Number of children: None   • Years of education: None   • Highest education level: None   Occupational History   • None   Tobacco Use   • Smoking status: Former Smoker     Packs/day: 1 00     Years: 10 00     Pack years: 10 00     Types: Cigarettes   • Smokeless tobacco: Never Used   Vaping Use   • Vaping Use: Never used   Substance and Sexual Activity   • Alcohol use: Yes     Comment: social   • Drug use: No   • Sexual activity: None   Other Topics Concern   • None   Social History Narrative   • None     Social Determinants of Health     Financial Resource Strain: Not on file   Food Insecurity: Not on file   Transportation Needs: Not on file   Physical Activity: Not on file   Stress: Not on file Social Connections: Not on file   Intimate Partner Violence: Not on file   Housing Stability: Not on file       Family History     Family History   Problem Relation Age of Onset   • Breast cancer Mother    • GI problems Father    • Depression Father    • Substance Abuse Neg Hx        Current Medications       Current Outpatient Medications:   •  ALPRAZolam (XANAX) 0 25 mg tablet, Take 1 tablet (0 25 mg total) by mouth daily at bedtime as needed for anxiety, Disp: 30 tablet, Rfl: 0  •  amphetamine-dextroamphetamine (ADDERALL) 20 mg tablet, Take 1 tablet (20 mg total) by mouth 2 (two) times a day Max Daily Amount: 40 mg, Disp: 60 tablet, Rfl: 0  •  fluticasone (FLONASE) 50 mcg/act nasal spray, 1 spray into each nostril daily, Disp: , Rfl:   •  VAHE FE 1 5/30 1 5-30 MG-MCG tablet, , Disp: , Rfl: 0  •  multivitamin (THERAGRAN) TABS, Take 1 tablet by mouth daily, Disp: , Rfl:   •  NON FORMULARY, Super food suppliment, Disp: , Rfl:   •  PARoxetine (PAXIL) 20 mg tablet, Take 1 tablet (20 mg total) by mouth daily, Disp: 90 tablet, Rfl: 0     Allergies     No Known Allergies    Objective     /72 (BP Location: Left arm, Patient Position: Sitting, Cuff Size: Large)   Pulse 76   Temp 98 3 °F (36 8 °C) (Temporal)   Resp 12   Ht 5' 4 5" (1 638 m)   Wt 80 3 kg (177 lb)   LMP 11/02/2022 (Exact Date)   SpO2 98%   BMI 29 91 kg/m²      Physical Exam  Vitals reviewed  Constitutional:       General: She is not in acute distress  Appearance: Normal appearance  She is well-developed  She is not diaphoretic  HENT:      Head: Normocephalic and atraumatic  Right Ear: Tympanic membrane, ear canal and external ear normal       Left Ear: Tympanic membrane, ear canal and external ear normal    Eyes:      General: Lids are normal       Extraocular Movements: Extraocular movements intact  Conjunctiva/sclera: Conjunctivae normal       Pupils: Pupils are equal, round, and reactive to light        Funduscopic exam: Right eye: No hemorrhage or exudate  Red reflex present  Left eye: No hemorrhage or exudate  Red reflex present  Neck:      Thyroid: No thyroid mass or thyromegaly  Vascular: No carotid bruit  Cardiovascular:      Rate and Rhythm: Normal rate and regular rhythm  Pulses: Normal pulses  Heart sounds: Normal heart sounds, S1 normal and S2 normal  No murmur heard  Pulmonary:      Effort: Pulmonary effort is normal       Breath sounds: Normal breath sounds  No decreased breath sounds, wheezing, rhonchi or rales  Chest:   Breasts:      Right: No supraclavicular adenopathy  Left: No supraclavicular adenopathy  Abdominal:      General: Bowel sounds are normal  There is no distension  Palpations: Abdomen is soft  Tenderness: There is no abdominal tenderness  Musculoskeletal:         General: Normal range of motion  Cervical back: Full passive range of motion without pain, normal range of motion and neck supple  Right lower leg: No edema  Left lower leg: No edema  Lymphadenopathy:      Cervical: No cervical adenopathy  Upper Body:      Right upper body: No supraclavicular adenopathy  Left upper body: No supraclavicular adenopathy  Skin:     General: Skin is warm and dry  Findings: No rash  Neurological:      General: No focal deficit present  Mental Status: She is alert and oriented to person, place, and time  Cranial Nerves: No cranial nerve deficit  Sensory: No sensory deficit  Motor: Motor function is intact  Coordination: Coordination normal       Deep Tendon Reflexes: Reflexes are normal and symmetric  Psychiatric:         Speech: Speech normal          Behavior: Behavior normal          Thought Content:  Thought content normal          Judgment: Judgment normal                Claudy Lechuga, [address] [place] [address],[address]

## 2022-11-16 NOTE — TELEPHONE ENCOUNTER
Spoke to Alex Bernstein and [place]    She was dropping her kids off and will call me back to schedule her 6 week appt

## 2022-11-21 DIAGNOSIS — F90.1 ATTENTION DEFICIT HYPERACTIVITY DISORDER (ADHD), PREDOMINANTLY HYPERACTIVE TYPE: ICD-10-CM

## 2022-11-22 RX ORDER — DEXTROAMPHETAMINE SACCHARATE, AMPHETAMINE ASPARTATE, DEXTROAMPHETAMINE SULFATE AND AMPHETAMINE SULFATE 5; 5; 5; 5 MG/1; MG/1; MG/1; MG/1
20 TABLET ORAL
Qty: 60 TABLET | Refills: 0 | Status: SHIPPED | OUTPATIENT
Start: 2022-11-22

## 2022-11-28 ENCOUNTER — OFFICE VISIT (OUTPATIENT)
Dept: FAMILY MEDICINE CLINIC | Facility: CLINIC | Age: 42
End: 2022-11-28

## 2022-11-28 VITALS
HEIGHT: 65 IN | SYSTOLIC BLOOD PRESSURE: 124 MMHG | RESPIRATION RATE: 14 BRPM | OXYGEN SATURATION: 99 % | WEIGHT: 177 LBS | BODY MASS INDEX: 29.49 KG/M2 | HEART RATE: 105 BPM | TEMPERATURE: 98.2 F | DIASTOLIC BLOOD PRESSURE: 80 MMHG

## 2022-11-28 DIAGNOSIS — J30.2 SEASONAL ALLERGIES: ICD-10-CM

## 2022-11-28 DIAGNOSIS — J98.01 BRONCHOSPASM: ICD-10-CM

## 2022-11-28 DIAGNOSIS — J30.2 SEASONAL ALLERGIC RHINITIS, UNSPECIFIED TRIGGER: Primary | ICD-10-CM

## 2022-11-28 RX ORDER — PREDNISONE 20 MG/1
20 TABLET ORAL 2 TIMES DAILY WITH MEALS
Qty: 6 TABLET | Refills: 0 | Status: SHIPPED | OUTPATIENT
Start: 2022-11-28 | End: 2022-12-01

## 2022-11-28 NOTE — PROGRESS NOTES
Assessment/Plan:    1  Seasonal allergic rhinitis, unspecified trigger  Assessment & Plan:  Cough and runny nose x's months  Recommend allergy control with zyrtec, flonase  Monitor for allergens and avoid triggers  2  Bronchospasm  -     predniSONE 20 mg tablet; Take 1 tablet (20 mg total) by mouth 2 (two) times a day with meals for 3 days    3  Seasonal allergies          Return if symptoms worsen or fail to improve  Subjective:      Patient ID: Partha Akers is a 38 y o  female  Chief Complaint   Patient presents with   • Cough     Pt here for lingering cough       Cough  This is a recurrent problem  The current episode started more than 1 month ago  The problem has been unchanged  The problem occurs every few minutes  The cough is non-productive  Associated symptoms include postnasal drip and rhinorrhea  Pertinent negatives include no chest pain, chills, ear congestion, ear pain, fever, headaches, nasal congestion, shortness of breath or wheezing  The symptoms are aggravated by lying down  She has tried nothing for the symptoms  The treatment provided no relief  The following portions of the patient's history were reviewed and updated as appropriate: allergies, current medications, past family history, past medical history, past social history, past surgical history and problem list     Review of Systems   Constitutional: Negative for chills, fatigue and fever  HENT: Positive for postnasal drip and rhinorrhea  Negative for congestion, ear pain, sinus pressure and sinus pain  Respiratory: Positive for cough and chest tightness  Negative for shortness of breath and wheezing  Cardiovascular: Negative for chest pain  Neurological: Negative for headaches           Current Outpatient Medications   Medication Sig Dispense Refill   • amphetamine-dextroamphetamine (ADDERALL) 20 mg tablet Take 1 tablet (20 mg total) by mouth 2 (two) times a day Max Daily Amount: 40 mg 60 tablet 0   • fluticasone (FLONASE) 50 mcg/act nasal spray 1 spray into each nostril daily     • VAHE FE 1 5/30 1 5-30 MG-MCG tablet   0   • LORazepam (ATIVAN) 1 mg tablet Take 1 tablet (1 mg total) by mouth every 8 (eight) hours as needed for anxiety 30 tablet 0   • multivitamin (THERAGRAN) TABS Take 1 tablet by mouth daily     • NON FORMULARY Super food suppliment     • PARoxetine (PAXIL) 30 mg tablet Take 1 tablet (30 mg total) by mouth every morning 90 tablet 0   • predniSONE 20 mg tablet Take 1 tablet (20 mg total) by mouth 2 (two) times a day with meals for 3 days 6 tablet 0     No current facility-administered medications for this visit  Objective:    /80   Pulse 105   Temp 98 2 °F (36 8 °C) (Temporal)   Resp 14   Ht 5' 4 5" (1 638 m)   Wt 80 3 kg (177 lb)   LMP 11/02/2022 (Exact Date)   SpO2 99%   BMI 29 91 kg/m²        Physical Exam  Vitals reviewed  Constitutional:       General: She is not in acute distress  Appearance: She is well-developed and well-nourished  She is not diaphoretic  HENT:      Head: Normocephalic and atraumatic  Right Ear: Tympanic membrane, ear canal and external ear normal  No drainage, swelling or tenderness  No middle ear effusion  Left Ear: Tympanic membrane, ear canal and external ear normal  No drainage, swelling or tenderness  No middle ear effusion  Nose: No sinus tenderness, mucosal edema or rhinorrhea  Right Sinus: No maxillary sinus tenderness or frontal sinus tenderness  Left Sinus: No maxillary sinus tenderness or frontal sinus tenderness  Mouth/Throat:      Mouth: Mucous membranes are normal       Pharynx: Uvula midline  No oropharyngeal exudate, posterior oropharyngeal edema or posterior oropharyngeal erythema  Eyes:      General:         Right eye: No discharge  Left eye: No discharge  Conjunctiva/sclera: Conjunctivae normal    Neck:      Thyroid: No thyromegaly     Cardiovascular:      Rate and Rhythm: Normal rate and regular rhythm  Heart sounds: Normal heart sounds  Pulmonary:      Effort: Pulmonary effort is normal  No respiratory distress  Breath sounds: Normal breath sounds  No decreased breath sounds, wheezing, rhonchi or rales  Abdominal:      General: Bowel sounds are normal  There is no distension  Palpations: Abdomen is soft  Tenderness: There is no abdominal tenderness  There is no rebound  Musculoskeletal:      Cervical back: Normal range of motion and neck supple  Lymphadenopathy:      Cervical: No cervical adenopathy  Skin:     General: Skin is warm and dry  Findings: No rash  Neurological:      Mental Status: She is alert and oriented to person, place, and time  Psychiatric:         Mood and Affect: Mood and affect normal          Behavior: Behavior normal          Thought Content:  Thought content normal                 Jacob Lab, CRNP

## 2022-11-30 PROBLEM — J30.2 SEASONAL ALLERGIC RHINITIS: Status: ACTIVE | Noted: 2022-11-30

## 2022-11-30 NOTE — ASSESSMENT & PLAN NOTE
Cough and runny nose x's months  Recommend allergy control with zyrtec, flonase  Monitor for allergens and avoid triggers

## 2022-12-19 ENCOUNTER — RA CDI HCC (OUTPATIENT)
Dept: OTHER | Facility: HOSPITAL | Age: 42
End: 2022-12-19

## 2022-12-19 NOTE — PROGRESS NOTES
[facility]  coding opportunities       Chart reviewed, no opportunity found: CHART REVIEWED, NO OPPORTUNITY FOUND        Patients Insurance        Commercial Insurance: [employer]

## 2022-12-29 ENCOUNTER — TELEMEDICINE (OUTPATIENT)
Dept: FAMILY MEDICINE CLINIC | Facility: CLINIC | Age: 42
End: 2022-12-29

## 2022-12-29 VITALS — HEIGHT: 65 IN | WEIGHT: 177 LBS | BODY MASS INDEX: 29.49 KG/M2

## 2022-12-29 DIAGNOSIS — F41.1 GENERALIZED ANXIETY DISORDER: Primary | ICD-10-CM

## 2022-12-29 DIAGNOSIS — M54.32 NEURALGIA OF LEFT SCIATIC NERVE: ICD-10-CM

## 2022-12-29 DIAGNOSIS — F90.1 ATTENTION DEFICIT HYPERACTIVITY DISORDER (ADHD), PREDOMINANTLY HYPERACTIVE TYPE: ICD-10-CM

## 2022-12-30 NOTE — PROGRESS NOTES
Virtual Regular Visit    Verification of patient location:    Patient is located in the following state in which I hold an active license [place]      Assessment/Plan:    Problem List Items Addressed This Visit        Nervous and Auditory    Neuralgia of left sciatic nerve     Pt reports that she is having a consult with chiropractor due to persistent symptoms  Other    Attention deficit hyperactivity disorder (ADHD), predominantly hyperactive type     Pt took medication break from adderall but has restarted as of today  Stable, no changes  Generalized anxiety disorder - Primary     Pt is doing well overall  No changes at this time  Continue medications as directed  Reason for visit is   Chief Complaint   Patient presents with   • Medicare Wellness Visit   • Virtual Regular Visit        Encounter provider MEJIA Sultana    Provider located at [address] [place] [address]      Recent Visits  Date Type Provider Dept   12/29/22 [address] [place] [address], [facility] [employer]  [facility]   Showing recent visits within past 7 days and meeting all other requirements  Future Appointments  No visits were found meeting these conditions  Showing future appointments within next 150 days and meeting all other requirements       The patient was identified by name and date of birth  Herschel Denver was informed that this is a telemedicine visit and that the visit is being conducted through the [address] Now platform  She agrees to proceed     My office door was closed  No one else was in the room  She acknowledged consent and understanding of privacy and security of the video platform  The patient has agreed to participate and understands they can discontinue the visit at any time  Patient is aware this is a billable service       David Freeman is a 38year old female who is scheduled for a virtual visit to discuss medications  Reports that her anxiety has been OK  States that she has not noted a large improvement in overall symptoms  Past Medical History:   Diagnosis Date   • Anxiety 2020   • Attention deficit hyperactivity disorder (ADHD), predominantly hyperactive type 3/10/2022   • Lumbar degenerative disc disease 3/10/2022   • Patient denies medical problems    • Seasonal allergic rhinitis 2022   • Stress headaches 2020       Past Surgical History:   Procedure Laterality Date   •  SECTION         Current Outpatient Medications   Medication Sig Dispense Refill   • amphetamine-dextroamphetamine (ADDERALL) 20 mg tablet Take 1 tablet (20 mg total) by mouth 2 (two) times a day Max Daily Amount: 40 mg 60 tablet 0   • fluticasone (FLONASE) 50 mcg/act nasal spray 1 spray into each nostril daily     • VAHE FE 1  1 5-30 MG-MCG tablet   0   • LORazepam (ATIVAN) 1 mg tablet Take 1 tablet (1 mg total) by mouth every 8 (eight) hours as needed for anxiety 30 tablet 0   • multivitamin (THERAGRAN) TABS Take 1 tablet by mouth daily     • NON FORMULARY Super food suppliment     • PARoxetine (PAXIL) 30 mg tablet Take 1 tablet (30 mg total) by mouth every morning 90 tablet 0     No current facility-administered medications for this visit  No Known Allergies    Review of Systems   Constitutional: Negative for chills and fever  Respiratory: Negative for shortness of breath  Cardiovascular: Negative for chest pain  Psychiatric/Behavioral: The patient is nervous/anxious (intermittent, chronic)  Video Exam    Vitals:    22 1204   Weight: 80 3 kg (177 lb)   Height: 5' 4 5" (1 638 m)       Physical Exam  Vitals reviewed  Constitutional:       Appearance: Normal appearance  HENT:      Head: Normocephalic and atraumatic  Neurological:      Mental Status: She is alert and oriented to person, place, and time     Psychiatric:         Mood and Affect: Mood normal  I spent 20 minutes directly with the patient during this visit

## 2023-01-03 DIAGNOSIS — F90.1 ATTENTION DEFICIT HYPERACTIVITY DISORDER (ADHD), PREDOMINANTLY HYPERACTIVE TYPE: ICD-10-CM

## 2023-01-03 RX ORDER — DEXTROAMPHETAMINE SACCHARATE, AMPHETAMINE ASPARTATE, DEXTROAMPHETAMINE SULFATE AND AMPHETAMINE SULFATE 5; 5; 5; 5 MG/1; MG/1; MG/1; MG/1
20 TABLET ORAL
Qty: 60 TABLET | Refills: 0 | Status: SHIPPED | OUTPATIENT
Start: 2023-01-03

## 2023-04-06 ENCOUNTER — TELEMEDICINE (OUTPATIENT)
Dept: FAMILY MEDICINE CLINIC | Facility: CLINIC | Age: 43
End: 2023-04-06

## 2023-04-06 VITALS — HEIGHT: 65 IN | WEIGHT: 177 LBS | BODY MASS INDEX: 29.49 KG/M2

## 2023-04-06 DIAGNOSIS — F90.1 ATTENTION DEFICIT HYPERACTIVITY DISORDER (ADHD), PREDOMINANTLY HYPERACTIVE TYPE: Primary | ICD-10-CM

## 2023-04-06 DIAGNOSIS — F41.9 ANXIETY: ICD-10-CM

## 2023-04-06 DIAGNOSIS — F41.1 GENERALIZED ANXIETY DISORDER: ICD-10-CM

## 2023-04-06 DIAGNOSIS — F33.0 MILD EPISODE OF RECURRENT MAJOR DEPRESSIVE DISORDER (HCC): ICD-10-CM

## 2023-04-06 RX ORDER — LORAZEPAM 0.5 MG/1
0.5 TABLET ORAL DAILY PRN
COMMUNITY
Start: 2023-03-22

## 2023-04-06 RX ORDER — ARIPIPRAZOLE 2 MG/1
2 TABLET ORAL DAILY
COMMUNITY
Start: 2023-03-13

## 2023-04-06 RX ORDER — ATOMOXETINE 10 MG/1
10 CAPSULE ORAL DAILY
COMMUNITY
Start: 2023-03-08 | End: 2023-04-06

## 2023-04-06 RX ORDER — PAROXETINE HYDROCHLORIDE 40 MG/1
40 TABLET, FILM COATED ORAL EVERY MORNING
COMMUNITY
Start: 2023-03-13

## 2023-04-06 NOTE — PROGRESS NOTES
Virtual Regular Visit    Verification of patient location:    Patient is located in the following state in which I hold an active license [place]      Assessment/Plan:    Problem List Items Addressed This Visit        Other    Anxiety    Attention deficit hyperactivity disorder (ADHD), predominantly hyperactive type - Primary     Pt had been following up with psych for management  Did not tolerate Strattera that was provided by [facility]  Pt reports that she restarted her adderall and she feels this works better for her in conjunction with the increased dose of paxil 40 mg  Will continue to manage ADHD medications from this office  Encourage medication compliance  Med check every 3 months  Relevant Medications    PARoxetine (PAXIL) 40 MG tablet    LORazepam (ATIVAN) 0 5 mg tablet    ARIPiprazole (ABILIFY) 2 mg tablet    Generalized anxiety disorder    Relevant Medications    PARoxetine (PAXIL) 40 MG tablet    LORazepam (ATIVAN) 0 5 mg tablet    ARIPiprazole (ABILIFY) 2 mg tablet   Other Visit Diagnoses     Mild episode of recurrent major depressive disorder (HCC)        Relevant Medications    PARoxetine (PAXIL) 40 MG tablet    LORazepam (ATIVAN) 0 5 mg tablet    ARIPiprazole (ABILIFY) 2 mg tablet               Reason for visit is   Chief Complaint   Patient presents with   • Follow-up     Pt here to discuss psych meds   • Virtual Regular Visit        [facility]     Provider located at [address] [place] [address]  [id number]      Recent Visits  Date Type Provider Dept   04/06/23 [address] [place] [address], [facility]   Showing recent visits within past 7 days and meeting all other requirements  Future Appointments  No visits were found meeting these conditions    Showing future appointments within next 150 days and meeting all other requirements       The patient was identified by name and date of birth  Chevy Schmid was informed that this is a telemedicine visit and that the visit is being conducted through the [address] [place] [address] Road Now platform  She agrees to proceed     My office door was closed  No one else was in the room  She acknowledged consent and understanding of privacy and security of the video platform  The patient has agreed to participate and understands they can discontinue the visit at any time  Patient is aware this is a billable service  Subjective  Chevy Schmid is a 38 y o  female with ADHD, depression and anxiety who is scheduled for a virtual visit to discuss recent changes to her medications  Past Medical History:   Diagnosis Date   • Anxiety 2020   • Attention deficit hyperactivity disorder (ADHD), predominantly hyperactive type 3/10/2022   • Lumbar degenerative disc disease 3/10/2022   • Patient denies medical problems    • Seasonal allergic rhinitis 2022   • Stress headaches 2020       Past Surgical History:   Procedure Laterality Date   •  SECTION         Current Outpatient Medications   Medication Sig Dispense Refill   • amphetamine-dextroamphetamine (ADDERALL) 20 mg tablet Take 1 tablet (20 mg total) by mouth 2 (two) times a day Max Daily Amount: 40 mg 60 tablet 0   • ARIPiprazole (ABILIFY) 2 mg tablet Take 2 mg by mouth daily     • fluticasone (FLONASE) 50 mcg/act nasal spray 1 spray into each nostril daily     • VAHE FE 1  1 5-30 MG-MCG tablet   0   • LORazepam (ATIVAN) 0 5 mg tablet Take 0 5 mg by mouth daily as needed     • LORazepam (ATIVAN) 1 mg tablet Take 1 tablet (1 mg total) by mouth every 8 (eight) hours as needed for anxiety 30 tablet 0   • multivitamin (THERAGRAN) TABS Take 1 tablet by mouth daily     • PARoxetine (PAXIL) 40 MG tablet Take 40 mg by mouth every morning     • NON FORMULARY Super food suppliment (Patient not taking: Reported on 2023)       No current facility-administered medications for this visit          No "Known Allergies    Review of Systems   Psychiatric/Behavioral: Positive for decreased concentration  The patient is nervous/anxious  Video Exam    Vitals:    04/06/23 1407   Weight: 80 3 kg (177 lb)   Height: 5' 4 5\" (1 638 m)       Physical Exam  Vitals reviewed  Constitutional:       Appearance: Normal appearance  HENT:      Head: Normocephalic and atraumatic  Neurological:      Mental Status: She is alert and oriented to person, place, and time     Psychiatric:         Mood and Affect: Mood normal           I spent 25 minutes directly with the patient during this visit      "

## 2023-04-07 NOTE — ASSESSMENT & PLAN NOTE
Pt had been following up with psych for management  Did not tolerate Strattera that was provided by [facility]  Pt reports that she restarted her adderall and she feels this works better for her in conjunction with the increased dose of paxil 40 mg  Will continue to manage ADHD medications from this office  Encourage medication compliance  Med check every 3 months

## 2023-04-23 DIAGNOSIS — F90.1 ATTENTION DEFICIT HYPERACTIVITY DISORDER (ADHD), PREDOMINANTLY HYPERACTIVE TYPE: ICD-10-CM

## 2023-04-25 RX ORDER — DEXTROAMPHETAMINE SACCHARATE, AMPHETAMINE ASPARTATE, DEXTROAMPHETAMINE SULFATE AND AMPHETAMINE SULFATE 5; 5; 5; 5 MG/1; MG/1; MG/1; MG/1
20 TABLET ORAL
Qty: 60 TABLET | Refills: 0 | Status: SHIPPED | OUTPATIENT
Start: 2023-04-25

## 2023-05-30 ENCOUNTER — PATIENT MESSAGE (OUTPATIENT)
Dept: FAMILY MEDICINE CLINIC | Facility: CLINIC | Age: 43
End: 2023-05-30

## 2023-05-30 DIAGNOSIS — F90.1 ATTENTION DEFICIT HYPERACTIVITY DISORDER (ADHD), PREDOMINANTLY HYPERACTIVE TYPE: ICD-10-CM

## 2023-05-31 ENCOUNTER — PATIENT MESSAGE (OUTPATIENT)
Dept: FAMILY MEDICINE CLINIC | Facility: CLINIC | Age: 43
End: 2023-05-31

## 2023-05-31 DIAGNOSIS — F90.1 ATTENTION DEFICIT HYPERACTIVITY DISORDER (ADHD), PREDOMINANTLY HYPERACTIVE TYPE: Primary | ICD-10-CM

## 2023-05-31 RX ORDER — DEXTROAMPHETAMINE SACCHARATE, AMPHETAMINE ASPARTATE, DEXTROAMPHETAMINE SULFATE AND AMPHETAMINE SULFATE 5; 5; 5; 5 MG/1; MG/1; MG/1; MG/1
20 TABLET ORAL
Qty: 60 TABLET | Refills: 0 | Status: SHIPPED | OUTPATIENT
Start: 2023-05-31

## 2023-05-31 NOTE — TELEPHONE ENCOUNTER
From: Kiley Maria  To: Ruthy La  Sent: 5/30/2023 6:57 AM EDT  Subject: adderall question    HI Elsa Khan! Question, can one take 60 mg of adderall? i ask because I take one around 6:30am and then I take my other one around 12 or 1pm  But then I start to feel sluggish and tired and don’t want to do anything around 4-5 or so  My thought is it seems to be a 4-6 hour kick if you will, and soemtimes I need to get so much done in the late afternoon and thought about taking one more dose  Can I try it?

## 2023-06-27 DIAGNOSIS — F90.1 ATTENTION DEFICIT HYPERACTIVITY DISORDER (ADHD), PREDOMINANTLY HYPERACTIVE TYPE: ICD-10-CM

## 2023-06-27 RX ORDER — DEXTROAMPHETAMINE SACCHARATE, AMPHETAMINE ASPARTATE, DEXTROAMPHETAMINE SULFATE AND AMPHETAMINE SULFATE 5; 5; 5; 5 MG/1; MG/1; MG/1; MG/1
20 TABLET ORAL
Qty: 60 TABLET | Refills: 0 | Status: CANCELLED | OUTPATIENT
Start: 2023-06-27

## 2023-06-27 RX ORDER — DEXTROAMPHETAMINE SACCHARATE, AMPHETAMINE ASPARTATE, DEXTROAMPHETAMINE SULFATE AND AMPHETAMINE SULFATE 7.5; 7.5; 7.5; 7.5 MG/1; MG/1; MG/1; MG/1
30 TABLET ORAL 2 TIMES DAILY
Qty: 60 TABLET | Refills: 0 | Status: SHIPPED | OUTPATIENT
Start: 2023-06-27 | End: 2023-07-03

## 2023-06-27 NOTE — TELEPHONE ENCOUNTER
Requested medication(s) are due for refill today: Yes  Patient has already received a courtesy refill: No  Other reason request has been forwarded to provider: Patient comment: Can we try the 30 mg?

## 2023-07-03 RX ORDER — DEXTROAMPHETAMINE SACCHARATE, AMPHETAMINE ASPARTATE MONOHYDRATE, DEXTROAMPHETAMINE SULFATE AND AMPHETAMINE SULFATE 5; 5; 5; 5 MG/1; MG/1; MG/1; MG/1
20 CAPSULE, EXTENDED RELEASE ORAL EVERY MORNING
Qty: 30 CAPSULE | Refills: 0 | Status: SHIPPED | OUTPATIENT
Start: 2023-07-03 | End: 2023-07-03

## 2023-07-03 RX ORDER — DEXTROAMPHETAMINE SACCHARATE, AMPHETAMINE ASPARTATE, DEXTROAMPHETAMINE SULFATE AND AMPHETAMINE SULFATE 5; 5; 5; 5 MG/1; MG/1; MG/1; MG/1
20 TABLET ORAL
Qty: 60 TABLET | Refills: 0 | Status: SHIPPED | OUTPATIENT
Start: 2023-07-03

## 2023-07-03 NOTE — TELEPHONE ENCOUNTER
From: Cristina Delacruz  To: Chapincito Kwok  Sent: 5/30/2023 6:57 AM EDT  Subject: adderall question    HI Tad Eugene! Question, can one take 60 mg of adderall? i ask because I take one around 6:30am and then I take my other one around 12 or 1pm. But then I start to feel sluggish and tired and don’t want to do anything around 4-5 or so. My thought is it seems to be a 4-6 hour kick if you will, and soemtimes I need to get so much done in the late afternoon and thought about taking one more dose. Can I try it?

## 2023-08-01 DIAGNOSIS — F90.1 ATTENTION DEFICIT HYPERACTIVITY DISORDER (ADHD), PREDOMINANTLY HYPERACTIVE TYPE: ICD-10-CM

## 2023-08-01 RX ORDER — DEXTROAMPHETAMINE SACCHARATE, AMPHETAMINE ASPARTATE, DEXTROAMPHETAMINE SULFATE AND AMPHETAMINE SULFATE 5; 5; 5; 5 MG/1; MG/1; MG/1; MG/1
20 TABLET ORAL
Qty: 60 TABLET | Refills: 0 | Status: SHIPPED | OUTPATIENT
Start: 2023-08-01

## 2023-08-01 NOTE — TELEPHONE ENCOUNTER
Requested medication(s) are due for refill today: Yes  Patient has already received a courtesy refill: No  Other reason request has been forwarded to provider:      Protocol Details  This refill cannot be delegated

## 2023-08-31 DIAGNOSIS — F90.1 ATTENTION DEFICIT HYPERACTIVITY DISORDER (ADHD), PREDOMINANTLY HYPERACTIVE TYPE: ICD-10-CM

## 2023-08-31 RX ORDER — DEXTROAMPHETAMINE SACCHARATE, AMPHETAMINE ASPARTATE, DEXTROAMPHETAMINE SULFATE AND AMPHETAMINE SULFATE 5; 5; 5; 5 MG/1; MG/1; MG/1; MG/1
20 TABLET ORAL
Qty: 60 TABLET | Refills: 0 | Status: SHIPPED | OUTPATIENT
Start: 2023-08-31

## 2023-10-04 ENCOUNTER — TELEPHONE (OUTPATIENT)
Age: 43
End: 2023-10-04

## 2023-10-30 DIAGNOSIS — F90.1 ATTENTION DEFICIT HYPERACTIVITY DISORDER (ADHD), PREDOMINANTLY HYPERACTIVE TYPE: ICD-10-CM

## 2023-10-31 RX ORDER — DEXTROAMPHETAMINE SACCHARATE, AMPHETAMINE ASPARTATE, DEXTROAMPHETAMINE SULFATE AND AMPHETAMINE SULFATE 5; 5; 5; 5 MG/1; MG/1; MG/1; MG/1
20 TABLET ORAL
Qty: 60 TABLET | Refills: 0 | Status: SHIPPED | OUTPATIENT
Start: 2023-10-31

## 2023-11-01 LAB — HBA1C MFR BLD HPLC: 5.3 %

## 2023-11-27 ENCOUNTER — OFFICE VISIT (OUTPATIENT)
Dept: FAMILY MEDICINE CLINIC | Facility: CLINIC | Age: 43
End: 2023-11-27
Payer: COMMERCIAL

## 2023-11-27 VITALS
WEIGHT: 197 LBS | TEMPERATURE: 97.9 F | HEART RATE: 100 BPM | OXYGEN SATURATION: 98 % | DIASTOLIC BLOOD PRESSURE: 88 MMHG | HEIGHT: 65 IN | RESPIRATION RATE: 20 BRPM | BODY MASS INDEX: 32.82 KG/M2 | SYSTOLIC BLOOD PRESSURE: 124 MMHG

## 2023-11-27 DIAGNOSIS — E04.9 THYROID ENLARGED: ICD-10-CM

## 2023-11-27 DIAGNOSIS — R61 EXCESSIVE SWEATING: ICD-10-CM

## 2023-11-27 DIAGNOSIS — Z00.00 ENCOUNTER FOR ANNUAL GENERAL MEDICAL EXAMINATION WITHOUT ABNORMAL FINDINGS IN ADULT: Primary | ICD-10-CM

## 2023-11-27 DIAGNOSIS — F41.1 GENERALIZED ANXIETY DISORDER: ICD-10-CM

## 2023-11-27 DIAGNOSIS — E78.2 ELEVATED TRIGLYCERIDES WITH HIGH CHOLESTEROL: ICD-10-CM

## 2023-11-27 DIAGNOSIS — F90.1 ATTENTION DEFICIT HYPERACTIVITY DISORDER (ADHD), PREDOMINANTLY HYPERACTIVE TYPE: ICD-10-CM

## 2023-11-27 DIAGNOSIS — E66.09 CLASS 1 OBESITY DUE TO EXCESS CALORIES WITH SERIOUS COMORBIDITY AND BODY MASS INDEX (BMI) OF 32.0 TO 32.9 IN ADULT: ICD-10-CM

## 2023-11-27 DIAGNOSIS — R61 DIAPHORESIS: ICD-10-CM

## 2023-11-27 PROBLEM — E66.811 CLASS 1 OBESITY DUE TO EXCESS CALORIES WITH SERIOUS COMORBIDITY AND BODY MASS INDEX (BMI) OF 32.0 TO 32.9 IN ADULT: Status: ACTIVE | Noted: 2023-11-27

## 2023-11-27 PROCEDURE — 99396 PREV VISIT EST AGE 40-64: CPT | Performed by: NURSE PRACTITIONER

## 2023-11-27 RX ORDER — FLUOXETINE HYDROCHLORIDE 20 MG/1
20 CAPSULE ORAL DAILY
Qty: 90 CAPSULE | Refills: 0 | Status: SHIPPED | OUTPATIENT
Start: 2023-11-27

## 2023-11-27 RX ORDER — ARIPIPRAZOLE 5 MG/1
5 TABLET ORAL DAILY
COMMUNITY
Start: 2023-10-24

## 2023-11-27 NOTE — ASSESSMENT & PLAN NOTE
Encourage regular exercise and nutrition. Pt taking paxil and this could be causing symptoms of excessive sweating and weight gain. Pt interested in switching to prozac - risks and benefits discussed.

## 2023-11-27 NOTE — PROGRESS NOTES
[facility] HEALTH MAINTENANCE OFFICE VISIT  [facility] - [address] PHYSICIANS    NAME: Lashay Duff  AGE: 41 y.o. SEX: female  : 1980     DATE: 2023    Assessment and Plan     1. Encounter for annual general medical examination without abnormal findings in adult  Comments:  Age appropriate screenings and recommendations discussed. Fasting labs reviewed. 2. Elevated triglycerides with high cholesterol  -     Lipid panel; Future; Expected date: 2024  -     Lipid panel    3. Attention deficit hyperactivity disorder (ADHD), predominantly hyperactive type  Assessment & Plan:  Taking adderall without issues at this time. Stable, no changes. 4. Generalized anxiety disorder  -     FLUoxetine (PROzac) 20 mg capsule; Take 1 capsule (20 mg total) by mouth daily    5. Diaphoresis  -     Sedimentation rate, automated; Future  -     C-reactive protein; Future  -     Testosterone, free, total; Future  -     Estrogens, total; Future  -     Cortisol Level,7-9 AM Specimen; Future  -     Sedimentation rate, automated  -     C-reactive protein  -     Testosterone, free, total  -     Estrogens, total  -     Cortisol Level,7-9 AM Specimen    6. Excessive sweating  -     Sedimentation rate, automated; Future  -     C-reactive protein; Future  -     Testosterone, free, total; Future  -     Estrogens, total; Future  -     Cortisol Level,7-9 AM Specimen; Future  -     Sedimentation rate, automated  -     C-reactive protein  -     Testosterone, free, total  -     Estrogens, total  -     Cortisol Level,7-9 AM Specimen    7. Thyroid enlarged  -     US thyroid; Future; Expected date: 2023    8. Class 1 obesity due to excess calories with serious comorbidity and body mass index (BMI) of 32.0 to 32.9 in adult  Assessment & Plan:  Encourage regular exercise and nutrition. Pt taking paxil and this could be causing symptoms of excessive sweating and weight gain.    Pt interested in switching to prozac - risks and benefits discussed. Patient Counseling:   Nutrition: Stressed importance of a well balanced diet, moderation of sodium/saturated fat, caloric balance and sufficient intake of fiber  Exercise: Stressed the importance of regular exercise with a goal of 150 minutes per week  Dental Health: Discussed daily flossing and brushing and regular dental visits   Alcohol Use:  Recommended moderation of alcohol intake  Injury Prevention: Discussed Safety Belts, Safety Helmets, and Smoke Detectors    Immunizations reviewed: Up To Date  Discussed benefits of:  Mammogram , Cervical Cancer screening, and Screening labs. BMI Counseling: Body mass index is 32.78 kg/m². Discussed with patient's BMI with her. The BMI is above normal. Nutrition recommendations include 3-5 servings of fruits/vegetables daily. Exercise recommendations include exercising 3-5 times per week. Return in about 2 weeks (around 2023) for Recheck- can be virtual.        Chief Complaint     Chief Complaint   Patient presents with    Physical Exam       History of Present Illness     HPI    Well Adult Physical   Patient here for a comprehensive physical exam.      Diet and Physical Activity  Diet: well balanced diet  Exercise: intermittently      Depression Screen  PHQ-2/9 Depression Screening    Little interest or pleasure in doing things: 2 - more than half the days  Feeling down, depressed, or hopeless: 2 - more than half the days  Trouble falling or staying asleep, or sleeping too much: 0 - not at all  Feeling tired or having little energy: 1 - several days  Poor appetite or overeatin - not at all  Feeling bad about yourself - or that you are a failure or have let yourself or your family down: 1 - several days  Trouble concentrating on things, such as reading the newspaper or watching television: 2 - more than half the days  Moving or speaking so slowly that other people could have noticed.  Or the opposite - being so fidgety or restless that you have been moving around a lot more than usual: 1 - several days  Thoughts that you would be better off dead, or of hurting yourself in some way: 0 - not at all  PHQ-2 Score: 4  PHQ-2 Interpretation: POSITIVE depression screen  PHQ-9 Score: 9   PHQ-9 Interpretation: Mild depression          Depression Screening Follow-up Plan: Patient's depression screening was positive with a PHQ-2 score of 4. Their PHQ-9 score was 9. Patient assessed for underlying major depression. They have no active suicidal ideations. Brief counseling provided and recommend additional follow-up/re-evaluation next office visit. Patient advised to follow-up with PCP for further management. General Health  Hearing: Normal:  bilateral  Vision: goes for regular eye exams  Dental: regular dental visits    Reproductive Health  No issues       The following portions of the patient's history were reviewed and updated as appropriate: allergies, current medications, past family history, past medical history, past social history, past surgical history and problem list.    Review of Systems     Review of Systems   Constitutional:  Positive for unexpected weight change (weight gain). Negative for diaphoresis, fatigue and fever. HENT:  Negative for ear pain and hearing loss. Eyes:  Negative for pain and visual disturbance. Respiratory:  Negative for chest tightness and shortness of breath. Cardiovascular:  Negative for chest pain, palpitations and leg swelling. Gastrointestinal:  Negative for abdominal pain, constipation and diarrhea. Genitourinary:  Negative for difficulty urinating. Musculoskeletal:  Negative for arthralgias and myalgias. Skin:  Negative for rash. Neurological:  Negative for dizziness, numbness and headaches. Psychiatric/Behavioral:  Negative for sleep disturbance.         Past Medical History     Past Medical History:   Diagnosis Date    Anxiety 12/8/2020 Attention deficit hyperactivity disorder (ADHD), predominantly hyperactive type 3/10/2022    Lumbar degenerative disc disease 3/10/2022    Patient denies medical problems     Seasonal allergic rhinitis 2022    Stress headaches 2020       Past Surgical History     Past Surgical History:   Procedure Laterality Date     SECTION         Social History     Social History     Socioeconomic History    Marital status: /Civil Union     Spouse name: None    Number of children: None    Years of education: None    Highest education level: None   Occupational History    None   Tobacco Use    Smoking status: Former     Packs/day: 1.00     Years: 10.00     Total pack years: 10.00     Types: Cigarettes    Smokeless tobacco: Never   Vaping Use    Vaping Use: Never used   Substance and Sexual Activity    Alcohol use: Yes     Comment: social    Drug use: No    Sexual activity: Yes   Other Topics Concern    None   Social History Narrative    None     Social Determinants of Health     Financial Resource Strain: Not on file   Food Insecurity: Not on file   Transportation Needs: Not on file   Physical Activity: Not on file   Stress: Not on file   Social Connections: Not on file   Intimate Partner Violence: Not on file   Housing Stability: Not on file       Family History     Family History   Problem Relation Age of Onset    Breast cancer Mother     GI problems Father     Depression Father     Substance Abuse Neg Hx        Current Medications       Current Outpatient Medications:     amphetamine-dextroamphetamine (ADDERALL, 20MG,) 20 mg tablet, Take 1 tablet (20 mg total) by mouth 2 (two) times a day Max Daily Amount: 40 mg, Disp: 60 tablet, Rfl: 0    ARIPiprazole (ABILIFY) 5 mg tablet, Take 5 mg by mouth daily, Disp: , Rfl:     FLUoxetine (PROzac) 20 mg capsule, Take 1 capsule (20 mg total) by mouth daily, Disp: 90 capsule, Rfl: 0    fluticasone (FLONASE) 50 mcg/act nasal spray, 1 spray into each nostril daily, Disp: , Rfl:     VAHE FE 1.5/30 1.5-30 MG-MCG tablet, , Disp: , Rfl: 0    LORazepam (ATIVAN) 0.5 mg tablet, Take 0.5 mg by mouth daily as needed, Disp: , Rfl:     LORazepam (ATIVAN) 1 mg tablet, Take 1 tablet (1 mg total) by mouth every 8 (eight) hours as needed for anxiety, Disp: 30 tablet, Rfl: 0    multivitamin (THERAGRAN) TABS, Take 1 tablet by mouth daily, Disp: , Rfl:     NON FORMULARY, Super food suppliment (Patient not taking: Reported on 4/6/2023), Disp: , Rfl:      Allergies     No Known Allergies    Objective     /88 (BP Location: Left arm, Patient Position: Sitting, Cuff Size: Standard)   Pulse 100   Temp 97.9 °F (36.6 °C) (Temporal)   Resp 20   Ht 5' 5" (1.651 m)   Wt 89.4 kg (197 lb)   LMP 11/01/2023 (Exact Date)   SpO2 98%   BMI 32.78 kg/m²      Physical Exam  Vitals reviewed. Constitutional:       General: She is not in acute distress. Appearance: Normal appearance. She is well-developed. She is not diaphoretic. HENT:      Head: Normocephalic and atraumatic. Right Ear: Tympanic membrane, ear canal and external ear normal.      Left Ear: Tympanic membrane, ear canal and external ear normal.   Eyes:      General: Lids are normal.      Extraocular Movements: Extraocular movements intact. Conjunctiva/sclera: Conjunctivae normal.      Pupils: Pupils are equal, round, and reactive to light. Funduscopic exam:     Right eye: Red reflex present. Left eye: Red reflex present. Neck:      Thyroid: No thyromegaly. Vascular: No carotid bruit. Cardiovascular:      Rate and Rhythm: Normal rate and regular rhythm. Pulses: Normal pulses. Heart sounds: Normal heart sounds, S1 normal and S2 normal. No murmur heard. Pulmonary:      Effort: Pulmonary effort is normal. No respiratory distress. Breath sounds: Normal breath sounds. No decreased breath sounds, wheezing, rhonchi or rales.    Abdominal:      General: Bowel sounds are normal. There is no distension. Palpations: Abdomen is soft. Tenderness: There is no abdominal tenderness. Genitourinary:     Vagina: Normal.      Cervix: No cervical motion tenderness or discharge. Adnexa:         Right: No mass, tenderness or fullness. Left: No mass, tenderness or fullness. Rectum: Normal.   Musculoskeletal:         General: Normal range of motion. Cervical back: Full passive range of motion without pain, normal range of motion and neck supple. Right lower leg: No edema. Left lower leg: No edema. Lymphadenopathy:      Cervical: No cervical adenopathy. Skin:     General: Skin is warm and dry. Findings: No erythema or rash. Neurological:      General: No focal deficit present. Mental Status: She is alert and oriented to person, place, and time. Cranial Nerves: No cranial nerve deficit. Sensory: No sensory deficit. Motor: Motor function is intact. Coordination: Coordination normal.      Deep Tendon Reflexes: Reflexes are normal and symmetric. Reflexes normal.   Psychiatric:         Behavior: Behavior normal.         Thought Content:  Thought content normal.         Judgment: Judgment normal.             [facility], [id number] [address]

## 2023-11-28 DIAGNOSIS — F90.1 ATTENTION DEFICIT HYPERACTIVITY DISORDER (ADHD), PREDOMINANTLY HYPERACTIVE TYPE: ICD-10-CM

## 2023-11-28 RX ORDER — DEXTROAMPHETAMINE SACCHARATE, AMPHETAMINE ASPARTATE, DEXTROAMPHETAMINE SULFATE AND AMPHETAMINE SULFATE 5; 5; 5; 5 MG/1; MG/1; MG/1; MG/1
20 TABLET ORAL
Qty: 60 TABLET | Refills: 0 | Status: SHIPPED | OUTPATIENT
Start: 2023-11-28

## 2023-12-04 ENCOUNTER — RA CDI HCC (OUTPATIENT)
Dept: OTHER | Facility: HOSPITAL | Age: 43
End: 2023-12-04

## 2023-12-04 NOTE — PROGRESS NOTES
[facility] coding opportunities       Chart reviewed, no opportunity found: CHART REVIEWED, NO OPPORTUNITY FOUND        Patients Insurance        Commercial Insurance: [address]

## 2023-12-06 ENCOUNTER — HOSPITAL ENCOUNTER (OUTPATIENT)
Dept: RADIOLOGY | Facility: HOSPITAL | Age: 43
Discharge: HOME | End: 2023-12-06
Payer: COMMERCIAL

## 2023-12-06 DIAGNOSIS — E04.9 THYROID ENLARGED: ICD-10-CM

## 2023-12-06 PROCEDURE — 76536 US EXAM OF HEAD AND NECK: CPT

## 2023-12-10 LAB
CORTIS SERPL-MCNC: 14.7 MCG/DL
CRP SERPL-MCNC: 11.3 MG/L
ERYTHROCYTE [SEDIMENTATION RATE] IN BLOOD BY WESTERGREN METHOD: 11 MM/H

## 2023-12-11 ENCOUNTER — TELEMEDICINE (OUTPATIENT)
Dept: FAMILY MEDICINE CLINIC | Facility: CLINIC | Age: 43
End: 2023-12-11
Payer: COMMERCIAL

## 2023-12-11 VITALS — BODY MASS INDEX: 32.82 KG/M2 | HEIGHT: 65 IN | WEIGHT: 197 LBS

## 2023-12-11 DIAGNOSIS — F41.1 GENERALIZED ANXIETY DISORDER: Primary | ICD-10-CM

## 2023-12-11 DIAGNOSIS — F90.1 ATTENTION DEFICIT HYPERACTIVITY DISORDER (ADHD), PREDOMINANTLY HYPERACTIVE TYPE: ICD-10-CM

## 2023-12-11 PROCEDURE — 99213 OFFICE O/P EST LOW 20 MIN: CPT | Performed by: NURSE PRACTITIONER

## 2023-12-11 NOTE — PROGRESS NOTES
Spoke to Stephy in [address] readying room. She will morgan it to be read.   She stated it may take a while

## 2023-12-11 NOTE — PROGRESS NOTES
Virtual Regular Visit    Verification of patient location:    Patient is located at Home in the following state in which I hold an active license [place]      Assessment/Plan:    Problem List Items Addressed This Visit       Attention deficit hyperactivity disorder (ADHD), predominantly hyperactive type     Continue adderall as directed. Stable, doing well overall. Generalized anxiety disorder - Primary     Pt reports that she is doing well on fluoxetine overall. Paxil has been Dc'd and pt has noticed an improvement in weight loss. Recheck 6 weeks. Reason for visit is   Chief Complaint   Patient presents with    Anxiety    Virtual Regular Visit          Encounter provider MEJIA Eastman    Provider located at [address] [facility] [address]      Recent Visits  Date Type Provider Dept   12/11/23 [id number] [place] [address], [address] [facility] 190 Physicians   Showing recent visits within past 7 days and meeting all other requirements  Future Appointments  No visits were found meeting these conditions. Showing future appointments within next 150 days and meeting all other requirements       The patient was identified by name and date of birth. Rosaline Beavers was informed that this is a telemedicine visit and that the visit is being conducted through the [address] [facility] [employer] Now platform. She agrees to proceed. .  My office door was closed. No one else was in the room. She acknowledged consent and understanding of privacy and security of the video platform. The patient has agreed to participate and understands they can discontinue the visit at any time. Patient is aware this is a billable service. Subjective  Rosaline Beavers is a 41 y.o. female who is scheduled for a virtual med check. Pt started prozac about 2 weeks ago and discontinued paxil. Reports that she has noticed a slight improvement in symptoms.       HPI Past Medical History:   Diagnosis Date    Anxiety 2020    Attention deficit hyperactivity disorder (ADHD), predominantly hyperactive type 3/10/2022    Lumbar degenerative disc disease 3/10/2022    Patient denies medical problems     Seasonal allergic rhinitis 2022    Stress headaches 2020       Past Surgical History:   Procedure Laterality Date     SECTION         Current Outpatient Medications   Medication Sig Dispense Refill    amphetamine-dextroamphetamine (ADDERALL, 20MG,) 20 mg tablet Take 1 tablet (20 mg total) by mouth 2 (two) times a day Max Daily Amount: 40 mg 60 tablet 0    ARIPiprazole (ABILIFY) 5 mg tablet Take 5 mg by mouth daily      FLUoxetine (PROzac) 20 mg capsule Take 1 capsule (20 mg total) by mouth daily 90 capsule 0    fluticasone (FLONASE) 50 mcg/act nasal spray 1 spray into each nostril daily      VAHE FE  1.5-30 MG-MCG tablet   0    LORazepam (ATIVAN) 0.5 mg tablet Take 0.5 mg by mouth daily as needed      LORazepam (ATIVAN) 1 mg tablet Take 1 tablet (1 mg total) by mouth every 8 (eight) hours as needed for anxiety 30 tablet 0    multivitamin (THERAGRAN) TABS Take 1 tablet by mouth daily      NON FORMULARY Super food suppliment (Patient not taking: Reported on 2023)       No current facility-administered medications for this visit. No Known Allergies    Review of Systems   Constitutional:  Negative for chills, fatigue and fever. Respiratory:  Negative for cough, chest tightness and shortness of breath. Cardiovascular:  Negative for chest pain. Psychiatric/Behavioral:  Negative for decreased concentration. The patient is not nervous/anxious. Video Exam    Vitals:    23 0859   Weight: 89.4 kg (197 lb)   Height: 5' 5" (1.651 m)       Physical Exam  Vitals reviewed. Constitutional:       Appearance: Normal appearance. HENT:      Head: Normocephalic and atraumatic.    Neurological:      Mental Status: She is alert and oriented to person, place, and time.    Psychiatric:         Mood and Affect: Mood normal.          Visit Time  Total Visit Duration: 25 minutes

## 2023-12-11 NOTE — PROGRESS NOTES
Results are listed as "partial" and not ready to be printed. I will keep my eyes on this today.   Suzanne Woodard

## 2023-12-12 NOTE — ASSESSMENT & PLAN NOTE
Pt reports that she is doing well on fluoxetine overall. Paxil has been Dc'd and pt has noticed an improvement in weight loss. Recheck 6 weeks.

## 2023-12-14 LAB
CORTIS SERPL-MCNC: 14.7 MCG/DL
CRP SERPL-MCNC: 11.3 MG/L
ERYTHROCYTE [SEDIMENTATION RATE] IN BLOOD BY WESTERGREN METHOD: 11 MM/H
ESTROGEN SERPL-MCNC: 80 PG/ML
TESTOST FREE SERPL-MCNC: 0.6 PG/ML (ref 0.1–6.4)
TESTOST SERPL-MCNC: 10 NG/DL (ref 2–45)

## 2023-12-29 DIAGNOSIS — F90.1 ATTENTION DEFICIT HYPERACTIVITY DISORDER (ADHD), PREDOMINANTLY HYPERACTIVE TYPE: ICD-10-CM

## 2023-12-29 NOTE — TELEPHONE ENCOUNTER
Requested medication(s) are due for refill today: Yes  Patient has already received a courtesy refill: No  Other reason request has been forwarded to provider: This refill cannot be delegated

## 2024-01-03 RX ORDER — DEXTROAMPHETAMINE SACCHARATE, AMPHETAMINE ASPARTATE, DEXTROAMPHETAMINE SULFATE AND AMPHETAMINE SULFATE 5; 5; 5; 5 MG/1; MG/1; MG/1; MG/1
20 TABLET ORAL
Qty: 60 TABLET | Refills: 0 | Status: SHIPPED | OUTPATIENT
Start: 2024-01-03

## 2024-01-04 ENCOUNTER — TELEPHONE (OUTPATIENT)
Age: 44
End: 2024-01-04

## 2024-01-04 NOTE — TELEPHONE ENCOUNTER
Patient called regarding her Adderall she only has 1 day of medication left. Patient would like the prior auth done urgently.

## 2024-01-04 NOTE — TELEPHONE ENCOUNTER
PA for amphetamine-dextroamphetamine (ADDERALL, 20MG,) 20 mg tablet     Submitted via  []CMM-KEY  [x][employer] -[id number]  []Faxed to plan   []Other website     Office notes sent, clinical questions answered. Awaiting determination

## 2024-01-05 NOTE — TELEPHONE ENCOUNTER
PA for amphetamine-dextroamphetamine (ADDERALL, 20MG,) 20 mg tablet  Approved   Date(s) approved 1/4/24-1/3/27  Case #    Patient advised by [x] MyChart Message                      [] Phone call       Pharmacy advised by [x]Fax                                     []Phone call    Approval letter scanned into Media Yes

## 2024-01-08 ENCOUNTER — TELEPHONE (OUTPATIENT)
Age: 44
End: 2024-01-08

## 2024-01-08 DIAGNOSIS — F41.9 ANXIETY: Primary | ICD-10-CM

## 2024-01-08 RX ORDER — FLUOXETINE HYDROCHLORIDE 40 MG/1
40 CAPSULE ORAL DAILY
Qty: 90 CAPSULE | Refills: 0 | Status: SHIPPED | OUTPATIENT
Start: 2024-01-08

## 2024-01-08 NOTE — TELEPHONE ENCOUNTER
Pt called to say she feels she needs to increase her dose of Prozac. She has been feeling worse and has had no relief, especially towards the end of the day. Please advise pt if we can increase it or if she needs a med ck appt.

## 2024-01-09 ENCOUNTER — TELEPHONE (OUTPATIENT)
Dept: PSYCHIATRY | Facility: CLINIC | Age: 44
End: 2024-01-09

## 2024-01-09 NOTE — TELEPHONE ENCOUNTER
Patient called and left voicemail wanting to speak to billing. This writer called patient back and went to voicemail. Message was left to call the office back

## 2024-01-09 NOTE — TELEPHONE ENCOUNTER
PSR left message requesting a call back to update insurance on file as insurance is coming up inactive.

## 2024-01-09 NOTE — TELEPHONE ENCOUNTER
PSR followed up with patient to confirm updating insurance. Patient stated she provided insurance information to someone. PSR confirmed it wasn't updated yet. PSR sent email to patients email on file for patient to attach a copy of her new insurance card.

## 2024-01-18 RX ORDER — AMOXICILLIN 500 MG/1
500 TABLET, FILM COATED ORAL EVERY 12 HOURS
COMMUNITY
Start: 2023-12-20 | End: 2024-01-22

## 2024-01-22 ENCOUNTER — TELEMEDICINE (OUTPATIENT)
Dept: FAMILY MEDICINE CLINIC | Facility: CLINIC | Age: 44
End: 2024-01-22
Payer: COMMERCIAL

## 2024-01-22 ENCOUNTER — TELEMEDICINE (OUTPATIENT)
Dept: BEHAVIORAL/MENTAL HEALTH CLINIC | Facility: CLINIC | Age: 44
End: 2024-01-22
Payer: COMMERCIAL

## 2024-01-22 VITALS — BODY MASS INDEX: 32.82 KG/M2 | WEIGHT: 197 LBS | HEIGHT: 65 IN

## 2024-01-22 DIAGNOSIS — F41.9 ANXIETY: ICD-10-CM

## 2024-01-22 DIAGNOSIS — F90.1 ADHD, PREDOMINANTLY HYPERACTIVE TYPE: Primary | ICD-10-CM

## 2024-01-22 DIAGNOSIS — F41.1 GENERALIZED ANXIETY DISORDER: ICD-10-CM

## 2024-01-22 DIAGNOSIS — F43.21 ADJUSTMENT DISORDER WITH DEPRESSED MOOD: ICD-10-CM

## 2024-01-22 DIAGNOSIS — E66.09 CLASS 1 OBESITY DUE TO EXCESS CALORIES WITH SERIOUS COMORBIDITY AND BODY MASS INDEX (BMI) OF 32.0 TO 32.9 IN ADULT: ICD-10-CM

## 2024-01-22 DIAGNOSIS — F90.1 ATTENTION DEFICIT HYPERACTIVITY DISORDER (ADHD), PREDOMINANTLY HYPERACTIVE TYPE: Primary | ICD-10-CM

## 2024-01-22 PROCEDURE — 90834 PSYTX W PT 45 MINUTES: CPT | Performed by: SOCIAL WORKER

## 2024-01-22 PROCEDURE — 99213 OFFICE O/P EST LOW 20 MIN: CPT | Performed by: NURSE PRACTITIONER

## 2024-01-22 NOTE — PROGRESS NOTES
Virtual Regular Visit    Verification of patient location:    Patient is located at Home in the following state in which I hold an active license [place]      Assessment/Plan:    Problem List Items Addressed This Visit       Attention deficit hyperactivity disorder (ADHD), predominantly hyperactive type - Primary     Taking adderall 20 mg BID. Stable, no issues.          Generalized anxiety disorder     Pt is doing well overall on fluoxetine 40 mg daily.  Stable, no changes.  Continue counseling as recommended.          Class 1 obesity due to excess calories with serious comorbidity and body mass index (BMI) of 32.0 to 32.9 in adult     Pt reports recent weight loss and denies any weight gain after discontinuing paxil. Doing well overall.  Continue daily exercise and nutrition.                  Reason for visit is   Chief Complaint   Patient presents with    Medication Management    Virtual Regular Visit          Encounter provider MEJIA Quiroz    Provider located at [address] [address]      Recent Visits  Date Type Provider Dept   01/22/24 Telemedicine MEJIA Quiroz [facility]   Showing recent visits within past 7 days and meeting all other requirements  Future Appointments  No visits were found meeting these conditions.  Showing future appointments within next 150 days and meeting all other requirements       The patient was identified by name and date of birth. Megan Jaime was informed that this is a telemedicine visit and that the visit is being conducted through the [employer] platform. She agrees to proceed..  My office door was closed. No one else was in the room.  She acknowledged consent and understanding of privacy and security of the video platform. The patient has agreed to participate and understands they can discontinue the visit at any time.    Patient is aware this is a billable  "service.     Subjective  Megan Jaime is a 43 y.o. female who is scheduled for a virtual visit and med check. Pt reports that she is doing well on fluoxetine and no longer has any excess weight gain that she had been experiencing on paxil.        Past Medical History:   Diagnosis Date    Anxiety 2020    Attention deficit hyperactivity disorder (ADHD), predominantly hyperactive type 3/10/2022    Lumbar degenerative disc disease 3/10/2022    Patient denies medical problems     Seasonal allergic rhinitis 2022    Stress headaches 2020       Past Surgical History:   Procedure Laterality Date     SECTION         Current Outpatient Medications   Medication Sig Dispense Refill    amphetamine-dextroamphetamine (ADDERALL, 20MG,) 20 mg tablet Take 1 tablet (20 mg total) by mouth 2 (two) times a day Max Daily Amount: 40 mg 60 tablet 0    ARIPiprazole (ABILIFY) 5 mg tablet Take 5 mg by mouth daily      FLUoxetine (PROzac) 40 MG capsule Take 1 capsule (40 mg total) by mouth daily 90 capsule 0    fluticasone (FLONASE) 50 mcg/act nasal spray 1 spray into each nostril daily      VAHE FE 1.5/30 1.5-30 MG-MCG tablet   0    LORazepam (ATIVAN) 0.5 mg tablet Take 0.5 mg by mouth daily as needed      LORazepam (ATIVAN) 1 mg tablet Take 1 tablet (1 mg total) by mouth every 8 (eight) hours as needed for anxiety 30 tablet 0    multivitamin (THERAGRAN) TABS Take 1 tablet by mouth daily      NON FORMULARY Super food suppliment (Patient not taking: Reported on 2023)       No current facility-administered medications for this visit.        No Known Allergies    Review of Systems   Constitutional:  Negative for chills, fatigue and fever.   Respiratory:  Negative for cough, chest tightness and shortness of breath.    Cardiovascular:  Negative for chest pain.       Video Exam    Vitals:    24 1455   Weight: 89.4 kg (197 lb)   Height: 5' 5\" (1.651 m)       Physical Exam  Vitals reviewed.   Constitutional:       " Appearance: Normal appearance.   HENT:      Head: Normocephalic and atraumatic.   Neurological:      Mental Status: She is alert and oriented to person, place, and time.   Psychiatric:         Mood and Affect: Mood normal.          Visit Time  Total Visit Duration: 20 minutes

## 2024-01-23 NOTE — ASSESSMENT & PLAN NOTE
Pt is doing well overall on fluoxetine 40 mg daily.  Stable, no changes.  Continue counseling as recommended.

## 2024-01-23 NOTE — ASSESSMENT & PLAN NOTE
Pt reports recent weight loss and denies any weight gain after discontinuing paxil. Doing well overall.  Continue daily exercise and nutrition.

## 2024-01-25 NOTE — PSYCH
This note was not shared with the patient due to this is a psychotherapy note      Virtual Regular Visit    Verification of patient location:    Patient is located at Home in the following state in which I hold an active license [place]      Assessment/Plan:    Problem List Items Addressed This Visit          Other    Anxiety     Other Visit Diagnoses       ADHD, predominantly hyperactive type    -  Primary    Adjustment disorder with depressed mood                   Reason for visit is No chief complaint on file.       Encounter provider Robyn Kelsey LCSW    Provider located at [facility]  [address] [facility] [address] [phone]      Recent Visits  Date Type Provider Dept   01/22/24 Telemedicine MEJIA Quiroz [facility]   01/22/24 Telemedicine Robyn Kelsey LCSW [facility]   Showing recent visits within past 7 days and meeting all other requirements  Future Appointments  No visits were found meeting these conditions.  Showing future appointments within next 150 days and meeting all other requirements       The patient was identified by name and date of birth. Megan Jaime was informed that this is a telemedicine visit and that the visit is being conducted throughthe Epic Embedded platform. She agrees to proceed..  My office door was closed. No one else was in the room.  She acknowledged consent and understanding of privacy and security of the video platform. The patient has agreed to participate and understands they can discontinue the visit at any time.    Patient is aware this is a billable service.     Subjective  Megan Jaime is a 43 y.o. female who presented for a follow-up virtual individual counseling session.  At Megan's request, today's session was conducted as a virtual phone session in order to comply with social distancing  "secondary to the coronavirus pandemic.  Megan presents to therapy after a 1.5 year gap in services.  Megan states her back pain has subsided since her injection and all has been well.  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  During today's session, Megan states she was taking Paxil for the year and realized she gained a lot of weight and now changing to Prozac, which has caused some mood swings.  Megan states she's also on Abilify and Adderrall.  Megan states her children are now Felipa (14 years old), Giane (7 years old), Kathi (10 years old)., and states they had to move to another home as their previous rental was being sold.  Megan states she's been happy in her marriage and we discussed towards the end her issues with food.  Megan states she feels anxiety around eating, cooking and was upset that her mother never showed her how to eat properly.  The undersigned therapist encouraged Megan spend more time doing things slowly, and discussed her reasons for returning to therapy. Megan states she's coming back to have more supportive therapy and denies suicidal ideation, sleeping fine.  Megan expressed \"having food issues,\" and cooking stress.  We discussed possibly meal planning.  Megan denies suicidal intent, gesture or ideation at this time.            HPI     Past Medical History:   Diagnosis Date    Anxiety 2020    Attention deficit hyperactivity disorder (ADHD), predominantly hyperactive type 3/10/2022    Lumbar degenerative disc disease 3/10/2022    Patient denies medical problems     Seasonal allergic rhinitis 2022    Stress headaches 2020       Past Surgical History:   Procedure Laterality Date     SECTION         Current Outpatient Medications   Medication Sig Dispense Refill    amphetamine-dextroamphetamine (ADDERALL, 20MG,) 20 mg tablet Take 1 tablet (20 mg total) by mouth 2 (two) times a day Max Daily Amount: 40 mg 60 tablet 0    " ARIPiprazole (ABILIFY) 5 mg tablet Take 5 mg by mouth daily      FLUoxetine (PROzac) 40 MG capsule Take 1 capsule (40 mg total) by mouth daily 90 capsule 0    fluticasone (FLONASE) 50 mcg/act nasal spray 1 spray into each nostril daily      VAHE FE 1.5/30 1.5-30 MG-MCG tablet   0    LORazepam (ATIVAN) 0.5 mg tablet Take 0.5 mg by mouth daily as needed      LORazepam (ATIVAN) 1 mg tablet Take 1 tablet (1 mg total) by mouth every 8 (eight) hours as needed for anxiety 30 tablet 0    multivitamin (THERAGRAN) TABS Take 1 tablet by mouth daily      NON FORMULARY Super food suppliment (Patient not taking: Reported on 4/6/2023)       No current facility-administered medications for this visit.        No Known Allergies    Review of Systems   Psychiatric/Behavioral:  The patient is nervous/anxious and is hyperactive.      Video Exam    There were no vitals filed for this visit.    Physical Exam  Psychiatric:         Attention and Perception: Attention and perception normal.         Mood and Affect: Affect normal. Mood is anxious.         Speech: Speech normal.         Behavior: Behavior normal. Behavior is cooperative.         Thought Content: Thought content normal.         Cognition and Memory: Cognition and memory normal.         Judgment: Judgment normal.        Visit Time    Visit Start Time: 1033  Visit Stop Time: 1125  Total Visit Duration:  52 minutes

## 2024-01-29 DIAGNOSIS — F90.1 ATTENTION DEFICIT HYPERACTIVITY DISORDER (ADHD), PREDOMINANTLY HYPERACTIVE TYPE: ICD-10-CM

## 2024-01-30 RX ORDER — DEXTROAMPHETAMINE SACCHARATE, AMPHETAMINE ASPARTATE, DEXTROAMPHETAMINE SULFATE AND AMPHETAMINE SULFATE 5; 5; 5; 5 MG/1; MG/1; MG/1; MG/1
20 TABLET ORAL
Qty: 60 TABLET | Refills: 0 | Status: SHIPPED | OUTPATIENT
Start: 2024-01-30

## 2024-02-05 ENCOUNTER — TELEMEDICINE (OUTPATIENT)
Dept: BEHAVIORAL/MENTAL HEALTH CLINIC | Facility: CLINIC | Age: 44
End: 2024-02-05
Payer: COMMERCIAL

## 2024-02-05 DIAGNOSIS — F43.21 ADJUSTMENT DISORDER WITH DEPRESSED MOOD: ICD-10-CM

## 2024-02-05 DIAGNOSIS — F90.1 ADHD, PREDOMINANTLY HYPERACTIVE TYPE: Primary | ICD-10-CM

## 2024-02-05 DIAGNOSIS — F41.9 ANXIETY: ICD-10-CM

## 2024-02-05 PROCEDURE — 90837 PSYTX W PT 60 MINUTES: CPT | Performed by: SOCIAL WORKER

## 2024-02-19 ENCOUNTER — TELEPHONE (OUTPATIENT)
Dept: PSYCHIATRY | Facility: CLINIC | Age: 44
End: 2024-02-19

## 2024-02-19 ENCOUNTER — TELEMEDICINE (OUTPATIENT)
Dept: BEHAVIORAL/MENTAL HEALTH CLINIC | Facility: CLINIC | Age: 44
End: 2024-02-19
Payer: COMMERCIAL

## 2024-02-19 DIAGNOSIS — F43.21 ADJUSTMENT DISORDER WITH DEPRESSED MOOD: ICD-10-CM

## 2024-02-19 DIAGNOSIS — F90.1 ADHD, PREDOMINANTLY HYPERACTIVE TYPE: Primary | ICD-10-CM

## 2024-02-19 DIAGNOSIS — F41.9 ANXIETY: ICD-10-CM

## 2024-02-19 PROCEDURE — 90837 PSYTX W PT 60 MINUTES: CPT | Performed by: SOCIAL WORKER

## 2024-02-19 NOTE — TELEPHONE ENCOUNTER
Behavorial Health Outpatient Intake Questions    Referred by:Robyn Kelsey,     Please advised interviewee that they need to answer all questions truthfully to allow for best care and any misrepresentations of information may affect their ability to be seen at this clinic   => Was this discussed? Yes     Behavorial Health Outpatient Intake History -     Presenting Problem (in patient's words): she has been 190 pounds since Paxil and Prozac change and feels she's been unable to lose weight.  She's been prescribed medication by her PCP but she is now open to seeing a psychiatrist and discussing other medical options. I also encouraged she request a Genesite test kit as she's tried 6+ types of antidepressants and some she does well with have side effects, hoping she can see someone to discuss those options..       Are there any developmental disabilities? ? If yes, can they speak to you on the phone? If they are too limited to speak to you on phone, refer out No    Are you taking any psychiatric medications? Yes    => If yes, who prescribes? If yes, are they injectable medications? See above    Does the patient have a language barrier or hearing impairment? No    Have you been treated at [facility] by a therapist or a doctor in the past? If yes, who? Yes Robyn Kelsey,    Has the patient been hospitalized for mental health? No   If yes, how long ago was last hospitalization and where was it?    Do you actively use alcohol or marijuana or illegal substances? If yes, what and how much - refer out to Drug and alcohol treatment if use is excessive or daily use of illegal substances No concerns of substance abuse are reported.    Do you have a community treatment team or ? No    Legal History-     Does the patient have any history of arrests, nursing home/retirement time, or DUIs? No  If Yes-  What types of charges?  When were they last incarcerated?  Are they currently on parole or probation?    Minor  Child-    Who has custody of the child?     Is there a custody agreement?     If there is a custody agreement remind parent that they must bring a copy to the first appt or they will not be seen.     Intake Team, please check with provider before scheduling if flags come up such as:  - complex case  - legal history (other than DUI)  - communication barrier concerns are present  - if, in your judgment, this needs further review    ACCEPTED as a patient Yes  => Appointment Date: 3/26/2024    Referred Elsewhere?     Name of Insurance Co:  Insurance ID#  Insurance Phone #  If ins is primary or secondary  If patient is a minor, parents information such as Name, D.O.B of guarantor.

## 2024-02-21 NOTE — PSYCH
This note was not shared with the patient due to this is a psychotherapy note    Virtual Regular Visit    Verification of patient location:    Patient is located at Home in the following state in which I hold an active license [place]      Assessment/Plan:    Problem List Items Addressed This Visit          Other    Anxiety     Other Visit Diagnoses       ADHD, predominantly hyperactive type    -  Primary    Adjustment disorder with depressed mood              Reason for visit is No chief complaint on file.       Encounter provider Robyn Kelsey LCSW    Provider located at [facility]  [address] [facility] [address] [phone]      Recent Visits  No visits were found meeting these conditions.  Showing recent visits within past 7 days and meeting all other requirements  Future Appointments  No visits were found meeting these conditions.  Showing future appointments within next 150 days and meeting all other requirements     The patient was identified by name and date of birth. Megan Jaime was informed that this is a telemedicine visit and that the visit is being conducted throughthe Epic Embedded platform. She agrees to proceed..  My office door was closed. No one else was in the room.  She acknowledged consent and understanding of privacy and security of the video platform. The patient has agreed to participate and understands they can discontinue the visit at any time.    Patient is aware this is a billable service.     Subjective  Megan Jaime is a 43 y.o. female who presented for a follow-up virtual individual counseling session.  At Megan's request, today's session was conducted as a virtual phone session in order to comply with social distancing secondary to the coronavirus pandemic.  During today's session, Meagn expressed her feelings around food and her struggles with eating.  The undersigned  therapist assisted Megan process her feelings about her relationship with her mother and the history with food.  Megan expressed anxiety with her children and their eating habits and the undersigned therapist encouraged exposure therapy. Megan is encouraged to go to the supermarket and look up simple recipes to try and get her used to eating. Megan denies suicidal intent, gesture or ideation at this time.      HPI     Past Medical History:   Diagnosis Date    Anxiety 2020    Attention deficit hyperactivity disorder (ADHD), predominantly hyperactive type 3/10/2022    Lumbar degenerative disc disease 3/10/2022    Patient denies medical problems     Seasonal allergic rhinitis 2022    Stress headaches 2020       Past Surgical History:   Procedure Laterality Date     SECTION         Current Outpatient Medications   Medication Sig Dispense Refill    amphetamine-dextroamphetamine (ADDERALL, 20MG,) 20 mg tablet Take 1 tablet (20 mg total) by mouth 2 (two) times a day Max Daily Amount: 40 mg 60 tablet 0    ARIPiprazole (ABILIFY) 5 mg tablet Take 5 mg by mouth daily      FLUoxetine (PROzac) 40 MG capsule Take 1 capsule (40 mg total) by mouth daily 90 capsule 0    fluticasone (FLONASE) 50 mcg/act nasal spray 1 spray into each nostril daily      VAHE FE 1.5/30 1.5-30 MG-MCG tablet   0    LORazepam (ATIVAN) 0.5 mg tablet Take 0.5 mg by mouth daily as needed      LORazepam (ATIVAN) 1 mg tablet Take 1 tablet (1 mg total) by mouth every 8 (eight) hours as needed for anxiety 30 tablet 0    multivitamin (THERAGRAN) TABS Take 1 tablet by mouth daily      NON FORMULARY Super food suppliment (Patient not taking: Reported on 2023)       No current facility-administered medications for this visit.        No Known Allergies    Review of Systems   Psychiatric/Behavioral:  The patient is nervous/anxious.      Video Exam    There were no vitals filed for this visit.    Physical Exam  Psychiatric:          Attention and Perception: Attention and perception normal.         Mood and Affect: Affect normal. Mood is anxious.         Speech: Speech normal.         Behavior: Behavior normal. Behavior is cooperative.         Thought Content: Thought content normal.         Cognition and Memory: Cognition and memory normal.         Judgment: Judgment normal.        Visit Time    Visit Start Time: 1030 a  Visit Stop Time: 1130 a   Total Visit Duration:  60 minutes

## 2024-02-27 NOTE — PSYCH
This note was not shared with the patient due to this is a psychotherapy note    Virtual Regular Visit    Verification of patient location:    Patient is located at Home in the following state in which I hold an active license [place]      Assessment/Plan:    Problem List Items Addressed This Visit          Other    Anxiety     Other Visit Diagnoses       ADHD, predominantly hyperactive type    -  Primary    Adjustment disorder with depressed mood                Goals addressed in session: Goal 1          Reason for visit is No chief complaint on file.       Encounter provider Robyn Kelsey LCSW    Provider located at [facility]  [address] [facility] [address] [phone]      Recent Visits  No visits were found meeting these conditions.  Showing recent visits within past 7 days and meeting all other requirements  Future Appointments  No visits were found meeting these conditions.  Showing future appointments within next 150 days and meeting all other requirements       The patient was identified by name and date of birth. Megan Jaime was informed that this is a telemedicine visit and that the visit is being conducted throughthe Epic Embedded platform. She agrees to proceed..  My office door was closed. No one else was in the room.  She acknowledged consent and understanding of privacy and security of the video platform. The patient has agreed to participate and understands they can discontinue the visit at any time.    Patient is aware this is a billable service.     Subjective  Megan Jaime is a 43 y.o. female the undersigned therapist met with the above patient for our scheduled biweekly session. For today, Megan states she continues to feel anxious and upset that she's now 190 pounds on her psychiatric medication. Megan strates she intermittent fasts and is unsure why she can't lose weight. The  undersigned therapist recommended she see a psychiatrist and nutritionist and writer made a referral to a psychiatric evaluation.  Megan states Akira has diabetes and she is happy he's changing his life style and lily healthy but she sometimes feels bad that she isn't as healthy as him.  Megan continues to have issues with food, cooking and eating but enjoys snacks.  Megan is aware that the undersigned therapist is leaving the practice and discussed her transitioning to a new therapist. Megan denies suicidal intent, gesture or ideation at this time.       .      HPI     Past Medical History:   Diagnosis Date    Anxiety 2020    Attention deficit hyperactivity disorder (ADHD), predominantly hyperactive type 3/10/2022    Lumbar degenerative disc disease 3/10/2022    Patient denies medical problems     Seasonal allergic rhinitis 2022    Stress headaches 2020       Past Surgical History:   Procedure Laterality Date     SECTION         Current Outpatient Medications   Medication Sig Dispense Refill    amphetamine-dextroamphetamine (ADDERALL, 20MG,) 20 mg tablet Take 1 tablet (20 mg total) by mouth 2 (two) times a day Max Daily Amount: 40 mg 60 tablet 0    ARIPiprazole (ABILIFY) 5 mg tablet Take 5 mg by mouth daily      FLUoxetine (PROzac) 40 MG capsule Take 1 capsule (40 mg total) by mouth daily 90 capsule 0    fluticasone (FLONASE) 50 mcg/act nasal spray 1 spray into each nostril daily      VAHE FE 1.5/30 1.5-30 MG-MCG tablet   0    LORazepam (ATIVAN) 0.5 mg tablet Take 0.5 mg by mouth daily as needed      LORazepam (ATIVAN) 1 mg tablet Take 1 tablet (1 mg total) by mouth every 8 (eight) hours as needed for anxiety 30 tablet 0    MICROGESTIN 1.5-30 MG-MCG TABS       multivitamin (THERAGRAN) TABS Take 1 tablet by mouth daily      NON FORMULARY Super food suppliment (Patient not taking: Reported on 2023)       No current facility-administered medications for this visit.        No Known  Allergies    Review of Systems   Psychiatric/Behavioral:  The patient is nervous/anxious.        Video Exam    There were no vitals filed for this visit.    Physical Exam  Psychiatric:         Attention and Perception: Attention and perception normal.         Mood and Affect: Affect normal. Mood is anxious.         Speech: Speech normal.         Behavior: Behavior normal. Behavior is cooperative.         Thought Content: Thought content normal.         Cognition and Memory: Cognition and memory normal.         Judgment: Judgment normal.          Visit Time    Visit Start Time: 1030  Visit Stop Time: 1130  Total Visit Duration:  60 minutes

## 2024-02-28 DIAGNOSIS — F90.1 ATTENTION DEFICIT HYPERACTIVITY DISORDER (ADHD), PREDOMINANTLY HYPERACTIVE TYPE: ICD-10-CM

## 2024-02-29 RX ORDER — DEXTROAMPHETAMINE SACCHARATE, AMPHETAMINE ASPARTATE, DEXTROAMPHETAMINE SULFATE AND AMPHETAMINE SULFATE 5; 5; 5; 5 MG/1; MG/1; MG/1; MG/1
20 TABLET ORAL
Qty: 60 TABLET | Refills: 0 | Status: SHIPPED | OUTPATIENT
Start: 2024-02-29

## 2024-03-05 DIAGNOSIS — F41.1 GENERALIZED ANXIETY DISORDER: Primary | ICD-10-CM

## 2024-03-05 RX ORDER — ARIPIPRAZOLE 5 MG/1
5 TABLET ORAL DAILY
Qty: 90 TABLET | Refills: 0 | Status: SHIPPED | OUTPATIENT
Start: 2024-03-05

## 2024-03-05 NOTE — TELEPHONE ENCOUNTER
I do not think I am the provider who prescribes this for her? I did a chart review as well. Does she want me to take this over?

## 2024-03-05 NOTE — TELEPHONE ENCOUNTER
Spoke to Megan.  She is no longer seeing [facility].  She is establishing with someone in [facility] 3/26.  Megan asked if you could write it for her moving forward.

## 2024-03-05 NOTE — TELEPHONE ENCOUNTER
Patient called and states she needs her Abilify to be a 90 day supply instead of 30 days. Insurance no longer covers only 30 days. Would like sent to [facility] pharmacy in [facility]. If there is any questions, can be reached at [phone]

## 2024-03-26 ENCOUNTER — OFFICE VISIT (OUTPATIENT)
Dept: PSYCHIATRY | Facility: CLINIC | Age: 44
End: 2024-03-26
Payer: COMMERCIAL

## 2024-03-26 DIAGNOSIS — F90.1 ATTENTION DEFICIT HYPERACTIVITY DISORDER (ADHD), PREDOMINANTLY HYPERACTIVE TYPE: ICD-10-CM

## 2024-03-26 DIAGNOSIS — F39 MOOD DISORDER (HCC): ICD-10-CM

## 2024-03-26 DIAGNOSIS — F41.9 ANXIETY: ICD-10-CM

## 2024-03-26 DIAGNOSIS — Z79.899 HIGH RISK MEDICATION USE: ICD-10-CM

## 2024-03-26 DIAGNOSIS — F41.0 PANIC ATTACK: Primary | ICD-10-CM

## 2024-03-26 DIAGNOSIS — F41.1 GENERALIZED ANXIETY DISORDER: ICD-10-CM

## 2024-03-26 PROCEDURE — 90792 PSYCH DIAG EVAL W/MED SRVCS: CPT | Performed by: NURSE PRACTITIONER
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
## 2024-03-27 ENCOUNTER — TELEPHONE (OUTPATIENT)
Dept: PSYCHIATRY | Facility: CLINIC | Age: 44
End: 2024-03-27

## 2024-03-28 DIAGNOSIS — F90.1 ATTENTION DEFICIT HYPERACTIVITY DISORDER (ADHD), PREDOMINANTLY HYPERACTIVE TYPE: ICD-10-CM

## 2024-03-28 RX ORDER — DEXTROAMPHETAMINE SACCHARATE, AMPHETAMINE ASPARTATE, DEXTROAMPHETAMINE SULFATE AND AMPHETAMINE SULFATE 5; 5; 5; 5 MG/1; MG/1; MG/1; MG/1
20 TABLET ORAL
Qty: 60 TABLET | Refills: 0 | Status: SHIPPED | OUTPATIENT
Start: 2024-03-28

## 2024-04-03 VITALS
HEIGHT: 65 IN | BODY MASS INDEX: 32.99 KG/M2 | WEIGHT: 198 LBS | DIASTOLIC BLOOD PRESSURE: 88 MMHG | HEART RATE: 88 BPM | SYSTOLIC BLOOD PRESSURE: 128 MMHG

## 2024-04-03 DIAGNOSIS — F41.9 ANXIETY: ICD-10-CM

## 2024-04-03 RX ORDER — FLUOXETINE HYDROCHLORIDE 40 MG/1
40 CAPSULE ORAL DAILY
Qty: 90 CAPSULE | Refills: 1 | Status: SHIPPED | OUTPATIENT
Start: 2024-04-03

## 2024-04-03 NOTE — BH TREATMENT PLAN
TREATMENT PLAN (Medication Management Only)        [facility]    Name and Date of Birth:  Megan Jaime 43 y.o. 1980  Date of Treatment Plan: March 26, 2024  Diagnosis/Diagnoses:    1. Panic attack    2. Generalized anxiety disorder    3. Attention deficit hyperactivity disorder (ADHD), predominantly hyperactive type    4. Anxiety    5. High risk medication use    6. Mood disorder (HCC)      Strengths/Personal Resources for Self-Care: supportive family, taking medications as prescribed, ability to communicate needs.  Area/Areas of need (in own words): mood instability, ADHD symptoms  1. Long Term Goal: improve mood stability.  Target Date:6 months - 10/3/2024  Person/Persons responsible for completion of goal: Megan provider, family  2.  Short Term Objective (s) - How will we reach this goal?:   A. Provider new recommended medication/dosage changes and/or continue medication(s): continue current medications as prescribed.  B.  Therapy .  C.  Structure .  Target Date:6 months - 10/3/2024  Person/Persons Responsible for Completion of Goal: ivonne Guzman, family  Progress Towards Goals: continuing treatment  Treatment Modality: medication management every 3 months  Review due 180 days from date of this plan: 6 months - 10/3/2024  Expected length of service: maintenance  My Physician/PA/NP and I have developed this plan together and I agree to work on the goals and objectives. I understand the treatment goals that were developed for my treatment.

## 2024-04-28 DIAGNOSIS — F90.1 ATTENTION DEFICIT HYPERACTIVITY DISORDER (ADHD), PREDOMINANTLY HYPERACTIVE TYPE: ICD-10-CM

## 2024-04-29 ENCOUNTER — TELEMEDICINE (OUTPATIENT)
Dept: FAMILY MEDICINE CLINIC | Facility: CLINIC | Age: 44
End: 2024-04-29
Payer: COMMERCIAL

## 2024-04-29 DIAGNOSIS — E78.1 HYPERTRIGLYCERIDEMIA: Primary | ICD-10-CM

## 2024-04-29 PROCEDURE — 99213 OFFICE O/P EST LOW 20 MIN: CPT | Performed by: NURSE PRACTITIONER

## 2024-04-29 RX ORDER — FENOFIBRATE 145 MG/1
145 TABLET, COATED ORAL DAILY
Qty: 90 TABLET | Refills: 0 | Status: SHIPPED | OUTPATIENT
Start: 2024-04-29

## 2024-04-29 RX ORDER — DEXTROAMPHETAMINE SACCHARATE, AMPHETAMINE ASPARTATE, DEXTROAMPHETAMINE SULFATE AND AMPHETAMINE SULFATE 5; 5; 5; 5 MG/1; MG/1; MG/1; MG/1
20 TABLET ORAL
Qty: 60 TABLET | Refills: 0 | Status: SHIPPED | OUTPATIENT
Start: 2024-04-29

## 2024-04-29 NOTE — PROGRESS NOTES
Virtual Regular Visit    Verification of patient location:    Patient is located at Home in the following state in which I hold an active license [place]      Assessment/Plan:    Problem List Items Addressed This Visit    None  Visit Diagnoses       Hypertriglyceridemia    -  Primary    Relevant Medications    fenofibrate (TRICOR) 145 mg tablet    Other Relevant Orders    Lipid panel          Reviewed lipid panel with patient in the office. Recommend heart healthy/mediteranean style eating. Discussed nutrition and exercise in detail.      Reason for visit is   Chief Complaint   Patient presents with    Virtual Regular Visit          Encounter provider MEJIA Quiroz      Recent Visits  Date Type Provider Dept   04/29/24 Telemedicine MEJIA Quiroz [facility]   Showing recent visits within past 7 days and meeting all other requirements  Future Appointments  No visits were found meeting these conditions.  Showing future appointments within next 150 days and meeting all other requirements       The patient was identified by name and date of birth. Megan Jaime was informed that this is a telemedicine visit and that the visit is being conducted through the [employer] platform. She agrees to proceed..  My office door was closed. No one else was in the room.  She acknowledged consent and understanding of privacy and security of the video platform. The patient has agreed to participate and understands they can discontinue the visit at any time.    Patient is aware this is a billable service.     Subjective  Megan Jaime is a 43 y.o. female who is scheduled for a virtual visit to discuss cholesterol. Denies any new acute complaints today.       Past Medical History:   Diagnosis Date    Anxiety 12/8/2020    Attention deficit hyperactivity disorder (ADHD), predominantly hyperactive type 3/10/2022    Lumbar degenerative disc disease 3/10/2022    Patient denies medical problems     Seasonal  allergic rhinitis 2022    Stress headaches 2020       Past Surgical History:   Procedure Laterality Date     SECTION         Current Outpatient Medications   Medication Sig Dispense Refill    fenofibrate (TRICOR) 145 mg tablet Take 1 tablet (145 mg total) by mouth daily 90 tablet 0    amphetamine-dextroamphetamine (ADDERALL, 20MG,) 20 mg tablet Take 1 tablet (20 mg total) by mouth 2 (two) times a day Max Daily Amount: 40 mg 60 tablet 0    cariprazine (Vraylar) 1.5 MG capsule Take 1 capsule (1.5 mg total) by mouth daily 30 capsule 3    FLUoxetine (PROzac) 40 MG capsule Take 1 capsule by mouth once daily 90 capsule 1    fluticasone (FLONASE) 50 mcg/act nasal spray 1 spray into each nostril daily      VAHE FE 1.5/30 1.5-30 MG-MCG tablet   0    LORazepam (ATIVAN) 0.5 mg tablet Take 0.5 mg by mouth daily as needed      MICROGESTIN 1.5-30 MG-MCG TABS       multivitamin (THERAGRAN) TABS Take 1 tablet by mouth daily       No current facility-administered medications for this visit.        No Known Allergies    Review of Systems   Constitutional:  Negative for chills, fatigue and fever.   Respiratory:  Negative for cough, chest tightness and shortness of breath.    Cardiovascular:  Negative for chest pain.       Video Exam    There were no vitals filed for this visit.    Physical Exam  Vitals reviewed.   Constitutional:       Appearance: Normal appearance.   HENT:      Head: Normocephalic and atraumatic.   Neurological:      Mental Status: She is alert and oriented to person, place, and time.   Psychiatric:         Mood and Affect: Mood normal.          Visit Time  Total Visit Duration: 15 minutes

## 2024-05-08 ENCOUNTER — OFFICE VISIT (OUTPATIENT)
Dept: PSYCHIATRY | Facility: CLINIC | Age: 44
End: 2024-05-08
Payer: COMMERCIAL

## 2024-05-08 VITALS
HEART RATE: 76 BPM | HEIGHT: 65 IN | SYSTOLIC BLOOD PRESSURE: 111 MMHG | BODY MASS INDEX: 32.19 KG/M2 | WEIGHT: 193.2 LBS | DIASTOLIC BLOOD PRESSURE: 76 MMHG

## 2024-05-08 DIAGNOSIS — F90.1 ATTENTION DEFICIT HYPERACTIVITY DISORDER (ADHD), PREDOMINANTLY HYPERACTIVE TYPE: Primary | ICD-10-CM

## 2024-05-08 DIAGNOSIS — F41.1 GENERALIZED ANXIETY DISORDER: ICD-10-CM

## 2024-05-08 DIAGNOSIS — F41.0 PANIC ATTACK: ICD-10-CM

## 2024-05-08 PROCEDURE — 90833 PSYTX W PT W E/M 30 MIN: CPT | Performed by: NURSE PRACTITIONER

## 2024-05-08 PROCEDURE — 99214 OFFICE O/P EST MOD 30 MIN: CPT | Performed by: NURSE PRACTITIONER

## 2024-05-13 NOTE — PSYCH
Visit Time    Visit Start Time: 10:30  Visit Stop Time: 11:00  Total Visit Duration:  30 minutes    Subjective:     Patient ID: Megan Jaime is a 44 y.o. female.  History of anxiety, panic attacks depression and ADHD seen for medication management and mood assessment.  Megan reports medication helps her focus and concentrate.  She continues to be anxious and depressed no motivation.Reviewed genetic swab results and changed Prozac to Trintellix she agreed. Encouraged to obtain lab work. Reports PHQ-9 score of 9 indicating mild depression and no si. AIMS is negative. Anxiety is reported as mild. Appetite and sleep patterns are good. Takes medication as prescribed. Family are supportive    HPI ROS Appetite Changes and Sleep: normal appetite and decreased energy    Review Of Systems:     Mood Anxiety and Depression   Behavior Normal    Thought Content Normal   General Emotional Problems   Personality Normal   Other Psych Symptoms Normal   Constitutional As Noted in HPI   ENT As Noted in HPI   Cardiovascular As Noted in HPI   Respiratory As Noted in HPI   Gastrointestinal As Noted in HPI   Genitourinary As Noted in HPI   Musculoskeletal As Noted in HPI   Integumentary As Noted in HPI   Neurological As Noted in HPI   Endocrine Normal    Other Symptoms Normal        Laboratory Results:     Substance Abuse History:  Social History     Substance and Sexual Activity   Drug Use No       Family Psychiatric History:   Family History   Problem Relation Age of Onset    Breast cancer Mother     GI problems Father     Depression Father     Substance Abuse Neg Hx        The following portions of the patient's history were reviewed and updated as appropriate: allergies, current medications, past family history, past medical history, past social history, past surgical history, and problem list.    Social History     Socioeconomic History    Marital status: /Civil Union     Spouse name: Not on file    Number of children: Not  on file    Years of education: Not on file    Highest education level: Not on file   Occupational History    Not on file   Tobacco Use    Smoking status: Former     Current packs/day: 1.00     Average packs/day: 1 pack/day for 10.0 years (10.0 ttl pk-yrs)     Types: Cigarettes    Smokeless tobacco: Never   Vaping Use    Vaping status: Never Used   Substance and Sexual Activity    Alcohol use: Yes     Comment: social    Drug use: No    Sexual activity: Yes   Other Topics Concern    Not on file   Social History Narrative    Not on file     Social Determinants of Health     Financial Resource Strain: Not on file   Food Insecurity: Not on file   Transportation Needs: Not on file   Physical Activity: Not on file   Stress: Not on file   Social Connections: Not on file   Intimate Partner Violence: Not on file   Housing Stability: Not on file     Social History     Social History Narrative    Not on file       Objective:       Mental status:  Appearance calm and cooperative , adequate hygiene and grooming, and good eye contact    Mood depressed   Affect affect appropriate    Speech a normal rate   Thought Processes normal thought processes   Hallucinations no hallucinations present    Thought Content no delusions   Abnormal Thoughts no suicidal thoughts  and no homicidal thoughts    Orientation  oriented to person and place and time   Remote Memory short term memory intact and long term memory intact   Attention Span concentration impaired   Intellect Appears to be of Average Intelligence   Insight Insight intact   Judgement judgment was intact   Muscle Strength Muscle strength and tone were normal   Language no difficulty naming common objects   Fund of Knowledge displays adequate knowledge of current events   Pain none   Pain Scale 0       Assessment/Plan:       Diagnoses and all orders for this visit:    Attention deficit hyperactivity disorder (ADHD), predominantly hyperactive type    Generalized anxiety disorder  -      "vortioxetine (Trintellix) 10 MG tablet; Take 1 tablet (10 mg total) by mouth daily    Panic attack  -     vortioxetine (Trintellix) 10 MG tablet; Take 1 tablet (10 mg total) by mouth daily            Treatment Recommendations- Risks Benefits      Immediate Medical/Psychiatric/Psychotherapy Treatments and Any Precautions: Continue treatment plan, DC Prozac start Trintellix    Risks, Benefits And Possible Side Effects Of Medications:  {PSYCH RISK, BENEFITS AND POSSIBLE SIDE EFFECTS (Optional):42591    Controlled Medication Discussion: She is aware of safe use and storage of medication    Psychotherapy Provided: 30 minutes individual psychotherapy provided.  Supportive therapy  Medication evaluation  Mood assessment  Surveys reviewed and confirmed  Normalized and validated her feelings  Safety plan not compiled; however, she is aware of who to call in crisis, 988 and ED if necessary   Medication education  Reviewed genetic results  Goals discussed in session: Improve mood and control of ADHD symptoms    \"Portions of the record may have been created with voice recognition software. Occasional wrong word or \"sound a like\" substitutions may have occurred due to the inherent limitations of voice recognition software. Read the chart carefully and recognize, using context, where substitutions have occurred. Please call if you have any questions. \"                                   "

## 2024-05-29 DIAGNOSIS — F90.1 ATTENTION DEFICIT HYPERACTIVITY DISORDER (ADHD), PREDOMINANTLY HYPERACTIVE TYPE: ICD-10-CM

## 2024-05-30 RX ORDER — DEXTROAMPHETAMINE SACCHARATE, AMPHETAMINE ASPARTATE, DEXTROAMPHETAMINE SULFATE AND AMPHETAMINE SULFATE 5; 5; 5; 5 MG/1; MG/1; MG/1; MG/1
20 TABLET ORAL
Qty: 60 TABLET | Refills: 0 | Status: SHIPPED | OUTPATIENT
Start: 2024-05-30

## 2024-06-10 DIAGNOSIS — F41.1 GENERALIZED ANXIETY DISORDER: Primary | ICD-10-CM

## 2024-06-10 RX ORDER — ARIPIPRAZOLE 5 MG/1
5 TABLET ORAL DAILY
Qty: 90 TABLET | Refills: 0 | Status: SHIPPED | OUTPATIENT
Start: 2024-06-10 | End: 2024-06-18 | Stop reason: ALTCHOICE

## 2024-06-18 ENCOUNTER — TELEMEDICINE (OUTPATIENT)
Dept: PSYCHIATRY | Facility: CLINIC | Age: 44
End: 2024-06-18
Payer: COMMERCIAL

## 2024-06-18 ENCOUNTER — TELEPHONE (OUTPATIENT)
Dept: PSYCHIATRY | Facility: CLINIC | Age: 44
End: 2024-06-18

## 2024-06-18 DIAGNOSIS — F41.0 PANIC ATTACK: ICD-10-CM

## 2024-06-18 DIAGNOSIS — F41.1 GENERALIZED ANXIETY DISORDER: ICD-10-CM

## 2024-06-18 DIAGNOSIS — F90.1 ATTENTION DEFICIT HYPERACTIVITY DISORDER (ADHD), PREDOMINANTLY HYPERACTIVE TYPE: ICD-10-CM

## 2024-06-18 DIAGNOSIS — F33.1 MODERATE EPISODE OF RECURRENT MAJOR DEPRESSIVE DISORDER (HCC): Primary | ICD-10-CM

## 2024-06-18 PROCEDURE — 90833 PSYTX W PT W E/M 30 MIN: CPT | Performed by: NURSE PRACTITIONER

## 2024-06-18 PROCEDURE — 99214 OFFICE O/P EST MOD 30 MIN: CPT | Performed by: NURSE PRACTITIONER

## 2024-06-18 NOTE — TELEPHONE ENCOUNTER
Pt added to talk therapy wait list after [employer] from Dr Foy.    IC spoke with pt about appt needs. Pt as no preference for male/female provider. Pt reports mid-morning would work best for therapy and pt open to virtual.

## 2024-06-19 NOTE — PSYCH
Virtual Regular Visit    Problem List Items Addressed This Visit          Behavioral Health    Attention deficit hyperactivity disorder (ADHD), predominantly hyperactive type    Relevant Medications    Vortioxetine HBr (Trintellix) 20 MG tablet    Generalized anxiety disorder    Relevant Medications    Vortioxetine HBr (Trintellix) 20 MG tablet    Panic attack     Other Visit Diagnoses       Moderate episode of recurrent major depressive disorder (HCC)    -  Primary    Relevant Medications    Vortioxetine HBr (Trintellix) 20 MG tablet          Reason for visit is   Chief Complaint   Patient presents with    Panic Attack    Depression    Anxiety    Medication Management    ADHD     Encounter provider Sivan Foy, PhD    Provider located at [address]  [address]  [facility] [id number] [phone]    Recent Visits  Date Type Provider Dept   06/18/24 Telemedicine Sivan Foy, PhD [facility]   Showing recent visits within past 7 days and meeting all other requirements  Future Appointments  No visits were found meeting these conditions.  Showing future appointments within next 150 days and meeting all other requirements       After connecting through [employer], the patient was identified by name and date of birth. Megan Jaime was informed that this is a telemedicine visit and that the visit is being conducted through the Epic Embedded platform. She agrees to proceed. which may not be secure and therefore, might not be HIPAA-compliant.  My office door was closed. No one else was in the room.  She acknowledged consent and understanding of privacy and security of the video platform. The patient has agreed to participate and understands they can discontinue the visit at any time.    SUBJECTIVE:    Megan Jaime is a 44 y.o. female with a history of ADHD, depression, panic attacks, anxiety seen for medication  management and mood assessment.  Megan reports she is doing well appetite and sleep patterns are good.  Still feels slightly down, denies suicidal ideation we discussed options and Trintellix was increased to 20 mg.  She reports success when she tries to complete small attainable goals and tasks.  Takes medication as prescribed.  Medication helps her focus and concentrate more.  Family are supportive    HPI ROS Appetite Changes and Sleep: normal appetite and normal energy level    Review Of Systems:     Mood Anxiety and Depression mild   Behavior Normal    Thought Content Normal   General Emotional Problems   Personality Normal   Other Psych Symptoms Normal   Constitutional As Noted in HPI   ENT As Noted in HPI   Cardiovascular As Noted in HPI   Respiratory As Noted in HPI   Gastrointestinal As Noted in HPI   Genitourinary As Noted in HPI   Musculoskeletal As Noted in HPI   Integumentary As Noted in HPI   Neurological As Noted in HPI   Endocrine Normal    Other Symptoms Normal        Substance Abuse History:    Social History     Substance and Sexual Activity   Drug Use No       Family Psychiatric History:     Family History   Problem Relation Age of Onset    Breast cancer Mother     GI problems Father     Depression Father     Substance Abuse Neg Hx        Social History     Socioeconomic History    Marital status: /Civil Union     Spouse name: Not on file    Number of children: Not on file    Years of education: Not on file    Highest education level: Not on file   Occupational History    Not on file   Tobacco Use    Smoking status: Former     Current packs/day: 1.00     Average packs/day: 1 pack/day for 10.0 years (10.0 ttl pk-yrs)     Types: Cigarettes    Smokeless tobacco: Never   Vaping Use    Vaping status: Never Used   Substance and Sexual Activity    Alcohol use: Yes     Comment: social    Drug use: No    Sexual activity: Yes   Other Topics Concern    Not on file   Social History Narrative    Not on  file     Social Determinants of Health     Financial Resource Strain: Not on file   Food Insecurity: Not on file   Transportation Needs: Not on file   Physical Activity: Not on file   Stress: Not on file   Social Connections: Not on file   Intimate Partner Violence: Not on file   Housing Stability: Not on file       Past Medical History:   Diagnosis Date    Anxiety 2020    Attention deficit hyperactivity disorder (ADHD), predominantly hyperactive type 3/10/2022    Lumbar degenerative disc disease 3/10/2022    Patient denies medical problems     Seasonal allergic rhinitis 2022    Stress headaches 2020       Past Surgical History:   Procedure Laterality Date     SECTION         Current Outpatient Medications   Medication Sig Dispense Refill    Vortioxetine HBr (Trintellix) 20 MG tablet Take 1 tablet (20 mg total) by mouth daily 30 tablet 3    amphetamine-dextroamphetamine (ADDERALL, 20MG,) 20 mg tablet Take 1 tablet (20 mg total) by mouth 2 (two) times a day Max Daily Amount: 40 mg 60 tablet 0    cariprazine (Vraylar) 1.5 MG capsule Take 1 capsule (1.5 mg total) by mouth daily 30 capsule 3    fenofibrate (TRICOR) 145 mg tablet Take 1 tablet (145 mg total) by mouth daily 90 tablet 0    fluticasone (FLONASE) 50 mcg/act nasal spray 1 spray into each nostril daily      VAHE FE 1.5/30 1.5-30 MG-MCG tablet   0    LORazepam (ATIVAN) 0.5 mg tablet Take 0.5 mg by mouth daily as needed      MICROGESTIN 1.5-30 MG-MCG TABS       multivitamin (THERAGRAN) TABS Take 1 tablet by mouth daily      vortioxetine (Trintellix) 10 MG tablet Take 1 tablet (10 mg total) by mouth daily 30 tablet 3     No current facility-administered medications for this visit.        No Known Allergies    The following portions of the patient's history were reviewed and updated as appropriate: allergies, current medications, past family history, past medical history, past social history, past surgical history, and problem  list.    OBJECTIVE:     Mental Status Examination:    Appearance calm and cooperative , adequate hygiene and grooming, and good eye contact    Mood anxious   Affect affect appropriate    Speech a normal rate   Thought Processes normal thought processes   Hallucinations no hallucinations present    Thought Content no delusions   Abnormal Thoughts no suicidal thoughts  and no homicidal thoughts    Orientation  oriented to person and place and time   Remote Memory short term memory intact and long term memory intact   Attention Span concentration impaired medication helps   Intellect Appears to be of Average Intelligence   Insight Insight intact   Judgement judgment was intact   Muscle Strength Muscle strength and tone were normal   Language no difficulty naming common objects   Fund of Knowledge displays adequate knowledge of current events   Pain none   Pain Scale 0       Laboratory Results: No results found for this or any previous visit.    Assessment/Plan:       Diagnoses and all orders for this visit:    Moderate episode of recurrent major depressive disorder (HCC)  -     Vortioxetine HBr (Trintellix) 20 MG tablet; Take 1 tablet (20 mg total) by mouth daily    Panic attack    Generalized anxiety disorder    Attention deficit hyperactivity disorder (ADHD), predominantly hyperactive type          Treatment Recommendations- Risks Benefits      Immediate Medical/Psychiatric/Psychotherapy Treatments and Any Precautions: Continue treatment plan increase Trintellix to 20 mg    Risks, Benefits And Possible Side Effects Of Medications:  {PSYCH RISK, BENEFITS AND POSSIBLE SIDE EFFECTS (Optional):47428    Controlled Medication Discussion: She is aware of safe use and storage of medication    Psychotherapy Provided: 30 minutes  Supportive therapy  Medication evaluation  Mood assessment   medication education  Normalized and validated her feelings  Safety plan not compiled; however, she is aware of who to call in crisis, 000  "and ED if necessary    Goals discussed in session: Reduce depression and anxiety     Treatment Plan:    Completed and signed during the session: Not applicable - Treatment Plan not due at this session    \"Portions of the record may have been created with voice recognition software. Occasional wrong word or \"sound a like\" substitutions may have occurred due to the inherent limitations of voice recognition software. Read the chart carefully and recognize, using context, where substitutions have occurred. Please call if you have any questions. \"      Sivan Foy, PhD 06/19/24  "

## 2024-06-21 ENCOUNTER — TELEPHONE (OUTPATIENT)
Dept: PSYCHIATRY | Facility: CLINIC | Age: 44
End: 2024-06-21

## 2024-06-21 NOTE — TELEPHONE ENCOUNTER
"Behavioral Health Outpatient Intake Questions    Referred By   : Dr Sivan Foy    Please advise interviewee that they need to answer all questions truthfully to allow for best care, and any misrepresentations of information may affect their ability to be seen at this clinic   => Was this discussed? Yes     If Minor Child (under age 18)    Who is/are the legal guardian(s) of the child?     Is there a custody agreement?      If \"YES\"- Custody orders must be obtained prior to scheduling the first appointment  In addition, Consent to Treatment must be signed by all legal guardians prior to scheduling the first appointment    If \"NO\"- Consent to Treatment must be signed by all legal guardians prior to scheduling the first appointment    Behavioral Health Outpatient Intake History -     Presenting Problem (in patient's own words): Anxiety, ADHD, Panic Attacks    Are there any communication barriers for this patient?     No                                               If yes, please describe barriers:   If there is a unique situation, please refer to Enrique Cavazos/Vilma Alberto for final determination.    Are you taking any psychiatric medications? Yes     If \"YES\" -What are they Trintellix     If \"YES\" -Who prescribes? Dr Sivan Foy    Has the Patient previously received outpatient Talk Therapy or Medication Management from [facility]  Yes        If \"YES\"- When, Where and with Whom? Pt currently sees Dr Sivan Foy. Pt saw Robyn Oneil for talk therapy before she retired.        If \"NO\" -Has Patient received these services elsewhere?       If \"YES\" -When, Where, and with Whom?    Has the Patient abused alcohol or other substances in the last 6 months ? No  No concerns of substance abuse are reported.     If \"YES\" -What substance, How much, How often?     If illegal substance: Refer to [facility] (for BRIGHT) or [facility] Offices.   If Alcohol in excess of 10 drinks per week:  Refer to [facility] " "(for BRIGHT) or [facility] Offices    Legal History-     Is this treatment court ordered? No   If \"yes \"send to :  Talk Therapy : Send to Enrique Cavazos/Vilma Alberto for final determination   Med Management: Send to Dr Doss for final determination     Has the Patient been convicted of a felony?  No   If \"Yes\" send to -When, What?  Talk Therapy : Send to Enrique Alberto for final determination   Med Management: Send to Dr Doss for final determination     ACCEPTED as a patient Yes  If \"Yes\" Appointment Date: with Lalita Tirado  Wednesdays at 10:00am: July 3 & 17 (virtually)    Referred Elsewhere? No  If “Yes” - (Where? Ex: [facility], [facility], [facility], [facility], etc.)       Name of Insurance Co: [employer]  Insurance ID# [id number]  Insurance Phone #   If ins is primary or secondary? primary  If patient is a minor, parents information such as Name, D.O.B of guarantor.    RTE for appts can be ran as of 6/30/2024.  "

## 2024-06-29 DIAGNOSIS — F90.1 ATTENTION DEFICIT HYPERACTIVITY DISORDER (ADHD), PREDOMINANTLY HYPERACTIVE TYPE: ICD-10-CM

## 2024-07-01 RX ORDER — DEXTROAMPHETAMINE SACCHARATE, AMPHETAMINE ASPARTATE, DEXTROAMPHETAMINE SULFATE AND AMPHETAMINE SULFATE 5; 5; 5; 5 MG/1; MG/1; MG/1; MG/1
20 TABLET ORAL
Qty: 60 TABLET | Refills: 0 | Status: SHIPPED | OUTPATIENT
Start: 2024-07-01

## 2024-07-03 ENCOUNTER — OFFICE VISIT (OUTPATIENT)
Dept: BEHAVIORAL/MENTAL HEALTH CLINIC | Facility: CLINIC | Age: 44
End: 2024-07-03
Payer: COMMERCIAL

## 2024-07-03 DIAGNOSIS — F33.0 MAJOR DEPRESSIVE DISORDER, RECURRENT, MILD (HCC): Primary | ICD-10-CM

## 2024-07-03 DIAGNOSIS — F41.9 ANXIETY: ICD-10-CM

## 2024-07-03 PROCEDURE — 90791 PSYCH DIAGNOSTIC EVALUATION: CPT | Performed by: SOCIAL WORKER

## 2024-07-08 NOTE — PSYCH
" Behavioral Health Psychotherapy Assessment    Date of Initial Psychotherapy Assessment: 07/08/24  Referral Source: Self  Has a release of information been signed for the referral source? NA    Preferred Name: Megan Jaime  Preferred Pronouns: She/her  YOB: 1980 Age: 44 y.o.  Sex assigned at birth: female   Gender Identity: Female  Race:   Preferred Language: English    Emergency Contact:  Full Name: Fred Jaime  Relationship to Client: Spouse  Contact information: [phone]    Primary Care Physician:  MEJIA Quiroz  [address] [place] [id number]  [phone]  Has a release of information been signed? No    Physical Health History:  Past surgical procedures: ear surgery as a child  Do you have a history of any of the following: none  Do you have any mobility issues? No    Relevant Family History:  Megan is the younger of two children to her biological parents. Her father is dead and she has not spoken to her mom in years. She does not speak to her brother as well. She is  and has three kids - 13 year old girl, 9 year old girl, and 7 year old son.     Presenting Problem (What brings you in?)  Megan struggles with anxiety and depression marked by excessive crying, excessive worry, feeling panicky all of the time, second guessing of decisions, and low mood. She reports feeling this way off and on most of her life due to her parents having \"the marriage from hell,\" which now seems to be triggered through her oldest daughter being bullied. Megan is struggling with parenting in middle age without the help and guidance from her own parents.     Mental Health Advance Directive:  Do you currently have a Mental Health Advance Directive?no    Diagnosis:  No diagnosis found.    Initial Assessment:     Current Mental Status:    Appearance: appropriate, casual and neat      Behavior/Manner: cooperative and tearful      Affect/Mood:  Depressed    Speech:  Normal    Sleep:  " Normal    Oriented to: oriented to self, oriented to place and oriented to time       Clinical Symptoms    Depression: yes      Anxiety: yes      Depression Symptoms: depressed mood, restlessness, excessive crying, indecision and irritable      Anxiety Symptoms: excessive worry, irritable, nervous/anxious, difficulty controlling worry and restlessness      Have you ever been assaultive to others or the environment: No      Have you ever been self-injurious: No      Counseling History:  Previous Counseling or Treatment  (Mental Health or Drug & Alcohol): No    Have you previously taken psychiatric medications: No      Suicide Risk Assessment  Have you ever had a suicide attempt: No    Have you had incidents of suicidal ideation: No    Are you currently experiencing suicidal thoughts: No      Substance Abuse/Addiction Assessment:  Alcohol: No    Heroin: No    Fentanyl: No    Opiates: No    Cocaine: No    Amphetamines: No    Hallucinogens: No    Club Drugs: No    Benzodiazepines: No    Other Rx Meds: No    Marijuana: No    Tobacco/Nicotine: No    Have you experienced blackouts as a result of substance use: No    Have you had any periods of abstinence: No    Have you experienced symptoms of withdrawal: No    Have you ever overdosed on any substances?: No    Are you currently using any Medication Assisted Treatment for Substance Use: No      Disordered Eating History:  Do you have a history of disordered eating: No      Social Determinants of Health:    SDOH:  None    Trauma and Abuse History:    Have you ever been abused: No      Legal History:    Have you ever been arrested  or had a DUI: No      Have you been incarcerated: No      Are you currently on parole/probation: No      Any current Children and Youth involvement: No      Any pending legal charges: No      Relationship History:    Current marital status:       Employment History    Are you currently employed: Yes      Longest period of employment:  10  years    Employer/ Job title:  [employer]     Sources of income/financial support:  Work     History:      Status: no history of  duty  Educational History:     Have you ever been diagnosed with a learning disability: No      Highest level of education:  Bachelor's Degree    School attended/attending:  Attended [employer] [facility] [employer]. Received Bachelor's degree in Fine Arts    Have you ever had an IEP or 504-plan: No      Do you need assistance with reading or writing: No      Recommended Treatment:     Psychotherapy:  Individual sessions    Frequency:  2 times    Session frequency:  Monthly    Visit start and stop times:    07/08/24  Start Time: 1000  Stop Time: 1100  Total Visit Time: 60 minutes

## 2024-07-12 DIAGNOSIS — F39 MOOD DISORDER (HCC): ICD-10-CM

## 2024-07-12 RX ORDER — CARIPRAZINE 1.5 MG/1
1.5 CAPSULE, GELATIN COATED ORAL DAILY
Qty: 30 CAPSULE | Refills: 3 | Status: SHIPPED | OUTPATIENT
Start: 2024-07-12

## 2024-07-16 ENCOUNTER — OFFICE VISIT (OUTPATIENT)
Dept: FAMILY MEDICINE CLINIC | Facility: CLINIC | Age: 44
End: 2024-07-16
Payer: COMMERCIAL

## 2024-07-16 ENCOUNTER — TELEPHONE (OUTPATIENT)
Dept: ADMINISTRATIVE | Facility: OTHER | Age: 44
End: 2024-07-16

## 2024-07-16 VITALS
WEIGHT: 193 LBS | HEART RATE: 108 BPM | RESPIRATION RATE: 16 BRPM | DIASTOLIC BLOOD PRESSURE: 78 MMHG | OXYGEN SATURATION: 99 % | BODY MASS INDEX: 32.15 KG/M2 | TEMPERATURE: 97.6 F | HEIGHT: 65 IN | SYSTOLIC BLOOD PRESSURE: 118 MMHG

## 2024-07-16 DIAGNOSIS — F90.1 ATTENTION DEFICIT HYPERACTIVITY DISORDER (ADHD), PREDOMINANTLY HYPERACTIVE TYPE: ICD-10-CM

## 2024-07-16 DIAGNOSIS — F41.1 GENERALIZED ANXIETY DISORDER: ICD-10-CM

## 2024-07-16 DIAGNOSIS — E66.09 CLASS 1 OBESITY DUE TO EXCESS CALORIES WITH SERIOUS COMORBIDITY AND BODY MASS INDEX (BMI) OF 32.0 TO 32.9 IN ADULT: Primary | ICD-10-CM

## 2024-07-16 DIAGNOSIS — R14.0 ABDOMINAL DISTENTION, NON-GASEOUS: ICD-10-CM

## 2024-07-16 PROCEDURE — 99214 OFFICE O/P EST MOD 30 MIN: CPT | Performed by: NURSE PRACTITIONER

## 2024-07-16 NOTE — LETTER
Procedure Request Form: Mammogram      Date Requested: 24  Patient: Megan Jaime  Patient : 1980   Referring Provider: MEJIA Quiroz        Date of Procedure _______2024 report_______________________       The above patient has informed us that they have completed their   most recent Mammogram at your facility. Please complete   this form and attach all corresponding procedure reports/results.    Comments __________________________________________________________  ____________________________________________________________________  ____________________________________________________________________  ____________________________________________________________________    Facility Completing Procedure _________________________________________    Form Completed By (print name) _______________________________________      Signature __________________________________________________________      These reports are needed for  compliance.    Please fax this completed form and a copy of the procedure report to our office located at [address] [place] [address] as soon as possible to Fax [phone] attention Pete: Phone [phone]    We thank you for your assistance in treating our mutual patient.

## 2024-07-16 NOTE — TELEPHONE ENCOUNTER
----- Message from Pearl GOMEZ sent at 7/16/2024  9:55 AM EDT -----  Regarding: CARE GAP REQUEST - [facility] - Mammogram  07/16/24 9:55 AM    Hello, our patient Megan Jaime has had Mammogram completed/performed. Please assist in updating the patient chart by making an External outreach to [facility] facility located in [place], [place]. The date of service is 01/2024.    Thank you,  Pearl Luz  [facility]

## 2024-07-16 NOTE — PROGRESS NOTES
Assessment/Plan:    1. Class 1 obesity due to excess calories with serious comorbidity and body mass index (BMI) of 32.0 to 32.9 in adult  -     Semaglutide-Weight Management (WEGOVY) 0.25 MG/0.5ML; Inject 0.5 mL (0.25 mg total) under the skin once a week for 4 doses  2. Abdominal distention, non-gaseous  -     US abdomen complete; Future; Expected date: 07/16/2024  3. Attention deficit hyperactivity disorder (ADHD), predominantly hyperactive type  Assessment & Plan:  Stable, no issues.  Continue medications as discussed.   4. Generalized anxiety disorder  Assessment & Plan:  No issues at this time. Stable.         Depression Screening Follow-up Plan: Patient's depression screening was positive with a PHQ-2 score of . Their PHQ-9 score was . Patient assessed for underlying major depression. They have no active suicidal ideations. Brief counseling provided and recommend additional follow-up/re-evaluation next office visit. Patient advised to follow-up with PCP for further management.      Return in about 3 months (around 10/16/2024) for Recheck.    Subjective:      Patient ID: Megan Jaime is a 44 y.o. female.    Chief Complaint   Patient presents with   • Weight Check     Pt here to discuss weight loss       Megan is a 44 year old female with obesity, anxiety, depression, ADHD who presents to the office to discuss difficulty losing weight. Pt reports that she is working out 3 days per week. States that she has a high protein diet. She is frustrated r/t lack of weight loss in her abdominal area specifically. States that when she stands up, her abdomen becomes firm and distended and that when she is lying down, she is still distended, but her abdomen is soft. Denies abdominal pain, abnormal vaginal bleeding or family history of ovarian or endometrial cancer. Reports that she has been avoiding gas producing foods although she recently did have beans during a meal. Denies excessive gas or constipation.       The  following portions of the patient's history were reviewed and updated as appropriate: allergies, current medications, past family history, past medical history, past social history, past surgical history and problem list.    Review of Systems   Constitutional:  Negative for chills, fatigue and fever.   Respiratory:  Negative for chest tightness and shortness of breath.    Cardiovascular:  Negative for chest pain.   Gastrointestinal:  Positive for abdominal distention. Negative for abdominal pain, constipation, diarrhea, nausea and vomiting.   Genitourinary:  Negative for menstrual problem, pelvic pain, vaginal bleeding, vaginal discharge and vaginal pain.         Current Outpatient Medications   Medication Sig Dispense Refill   • amphetamine-dextroamphetamine (ADDERALL, 20MG,) 20 mg tablet Take 1 tablet (20 mg total) by mouth 2 (two) times a day Max Daily Amount: 40 mg 60 tablet 0   • cariprazine (Vraylar) 1.5 MG capsule Take 1 capsule by mouth once daily 30 capsule 3   • fenofibrate (TRICOR) 145 mg tablet Take 1 tablet (145 mg total) by mouth daily 90 tablet 0   • fluticasone (FLONASE) 50 mcg/act nasal spray 1 spray into each nostril daily     • VAHE FE 1.5/30 1.5-30 MG-MCG tablet   0   • LORazepam (ATIVAN) 0.5 mg tablet Take 0.5 mg by mouth daily as needed     • multivitamin (THERAGRAN) TABS Take 1 tablet by mouth daily     • Semaglutide-Weight Management (WEGOVY) 0.25 MG/0.5ML Inject 0.5 mL (0.25 mg total) under the skin once a week for 4 doses 2 mL 0   • Vortioxetine HBr (Trintellix) 20 MG tablet Take 1 tablet (20 mg total) by mouth daily 30 tablet 3   • MICROGESTIN 1.5-30 MG-MCG TABS  (Patient not taking: Reported on 7/16/2024)     • vortioxetine (Trintellix) 10 MG tablet Take 1 tablet (10 mg total) by mouth daily (Patient not taking: Reported on 7/16/2024) 30 tablet 3     No current facility-administered medications for this visit.       Objective:    /78 (BP Location: Left arm, Patient Position:  "Sitting, Cuff Size: Large)   Pulse (!) 108   Temp 97.6 °F (36.4 °C) (Temporal)   Resp 16   Ht 5' 5\" (1.651 m)   Wt 87.5 kg (193 lb)   LMP 07/09/2024 (Exact Date)   SpO2 99%   BMI 32.12 kg/m²        Physical Exam  Vitals reviewed.   Constitutional:       General: She is not in acute distress.     Appearance: She is well-developed. She is not diaphoretic.   HENT:      Head: Normocephalic and atraumatic.   Eyes:      General: Lids are normal.         Right eye: No discharge.         Left eye: No discharge.      Conjunctiva/sclera: Conjunctivae normal.   Neck:      Thyroid: No thyromegaly.   Cardiovascular:      Rate and Rhythm: Normal rate and regular rhythm.      Heart sounds: Normal heart sounds.   Pulmonary:      Effort: Pulmonary effort is normal. No respiratory distress.      Breath sounds: Normal breath sounds. No decreased breath sounds, wheezing, rhonchi or rales.   Abdominal:      General: Bowel sounds are normal. There is distension.      Palpations: Abdomen is soft. There is no hepatomegaly or splenomegaly.      Tenderness: There is no abdominal tenderness.   Musculoskeletal:      Cervical back: Normal range of motion and neck supple.   Lymphadenopathy:      Cervical: No cervical adenopathy.   Skin:     General: Skin is warm and dry.      Findings: No rash.   Neurological:      Mental Status: She is alert and oriented to person, place, and time.   Psychiatric:         Behavior: Behavior normal.         Thought Content: Thought content normal.         Judgment: Judgment normal.              MEJIA Quiroz  "

## 2024-07-16 NOTE — TELEPHONE ENCOUNTER
Upon review of the In Basket request and the patient's chart, initial outreach has been made via fax to facility. Please see Contacts section for details.     Thank you  Pete Monreal MA

## 2024-07-17 ENCOUNTER — TELEPHONE (OUTPATIENT)
Age: 44
End: 2024-07-17

## 2024-07-17 NOTE — TELEPHONE ENCOUNTER
PA for Semaglutide-Weight Management (WEGOVY) 0.25 MG/0.5ML    Submitted via    []CMM-KEY   [x][employer]-Case ID # [id number]  []Faxed to plan   []Other website   []Phone call Case ID #     Office notes sent, clinical questions answered. Awaiting determination    Turnaround time for your insurance to make a decision on your Prior Authorization can take 7-21 business days.

## 2024-07-17 NOTE — TELEPHONE ENCOUNTER
PA for Semaglutide-Weight Management (WEGOVY) 0.25 MG/0.5ML Approved     Date(s) approved 7- - 2-        Patient advised by          [x] [employer] Message  [] Phone call   []LMOM  []L/M to call office as no active Communication consent on file  []Unable to leave detailed message as VM not approved on Communication consent       Pharmacy advised by    [x]Fax  []Phone call    Approval letter scanned into Media Yes

## 2024-07-19 ENCOUNTER — TELEMEDICINE (OUTPATIENT)
Dept: BEHAVIORAL/MENTAL HEALTH CLINIC | Facility: CLINIC | Age: 44
End: 2024-07-19
Payer: COMMERCIAL

## 2024-07-19 DIAGNOSIS — F90.1 ADHD, PREDOMINANTLY HYPERACTIVE TYPE: ICD-10-CM

## 2024-07-19 DIAGNOSIS — F41.9 ANXIETY: ICD-10-CM

## 2024-07-19 DIAGNOSIS — F33.0 MAJOR DEPRESSIVE DISORDER, RECURRENT, MILD (HCC): Primary | ICD-10-CM

## 2024-07-19 PROCEDURE — 90837 PSYTX W PT 60 MINUTES: CPT | Performed by: SOCIAL WORKER

## 2024-07-19 NOTE — PSYCH
Virtual Regular Visit    Verification of patient location:    Patient is located at Home in the following state in which I hold an active license [place]      Assessment/Plan:    Problem List Items Addressed This Visit          Behavioral Health    Anxiety     Other Visit Diagnoses       Major depressive disorder, recurrent, mild (HCC)    -  Primary    ADHD, predominantly hyperactive type                Goals addressed in session: Goal 1          Reason for visit is No chief complaint on file.       Encounter provider Lalita Tirado LCSW      Recent Visits  Date Type Provider Dept   07/16/24 Office Visit MEJIA Quiroz [facility]   Showing recent visits within past 7 days and meeting all other requirements  Today's Visits  Date Type Provider Dept   07/19/24 Telemedicine Lalita Tirado LCSW [facility] [place]   Showing today's visits and meeting all other requirements  Future Appointments  No visits were found meeting these conditions.  Showing future appointments within next 150 days and meeting all other requirements       The patient was identified by name and date of birth. Megan Jaime was informed that this is a telemedicine visit and that the visit is being conducted throughthe Epic Embedded platform. She agrees to proceed..  My office door was closed. No one else was in the room.  She acknowledged consent and understanding of privacy and security of the video platform. The patient has agreed to participate and understands they can discontinue the visit at any time.    Patient is aware this is a billable service.     Subjective  Megan Jaime is a 44 y.o. female  .      HPI     Past Medical History:   Diagnosis Date    Anxiety 12/8/2020    Attention deficit hyperactivity disorder (ADHD), predominantly hyperactive type 3/10/2022    Lumbar degenerative disc disease 3/10/2022    Patient denies medical problems     Seasonal allergic rhinitis 11/30/2022     Stress headaches 2020       Past Surgical History:   Procedure Laterality Date     SECTION         Current Outpatient Medications   Medication Sig Dispense Refill    amphetamine-dextroamphetamine (ADDERALL, 20MG,) 20 mg tablet Take 1 tablet (20 mg total) by mouth 2 (two) times a day Max Daily Amount: 40 mg 60 tablet 0    cariprazine (Vraylar) 1.5 MG capsule Take 1 capsule by mouth once daily 30 capsule 3    fenofibrate (TRICOR) 145 mg tablet Take 1 tablet (145 mg total) by mouth daily 90 tablet 0    fluticasone (FLONASE) 50 mcg/act nasal spray 1 spray into each nostril daily      VAHE FE 1.5/30 1.5-30 MG-MCG tablet   0    LORazepam (ATIVAN) 0.5 mg tablet Take 0.5 mg by mouth daily as needed      MICROGESTIN 1.5-30 MG-MCG TABS  (Patient not taking: Reported on 2024)      multivitamin (THERAGRAN) TABS Take 1 tablet by mouth daily      Semaglutide-Weight Management (WEGOVY) 0.25 MG/0.5ML Inject 0.5 mL (0.25 mg total) under the skin once a week for 4 doses 2 mL 0    vortioxetine (Trintellix) 10 MG tablet Take 1 tablet (10 mg total) by mouth daily (Patient not taking: Reported on 2024) 30 tablet 3    Vortioxetine HBr (Trintellix) 20 MG tablet Take 1 tablet (20 mg total) by mouth daily 30 tablet 3     No current facility-administered medications for this visit.        No Known Allergies    Review of Systems    Video Exam    There were no vitals filed for this visit.    Physical Exam     Visit Time    Visit Start Time: 13:00  Visit Stop Time: 14:00  Total Visit Duration:  60 minutes          Behavioral Health Psychotherapy Progress Note    Psychotherapy Provided: Individual Psychotherapy     1. Major depressive disorder, recurrent, mild (HCC)        2. Anxiety        3. ADHD, predominantly hyperactive type            Goals addressed in session: Goal 1     DATA: Megan reports feeling well. She shared having an easy time at work due to the nature of her job slowing down during the summer months  "but is anticipating picking back up in the fall. She reported struggling with guilt and shame as it relates to her schedule because she has so much free time. She reports feeling like it is wrong and thus cannot enjoy or fully unwind when she is doing thins like, going to the pool or just simply watching TV. Talked about parasympathic nerous unwind and how it can be difficult for those who attach their meaning to physical output. Advised that she practice breath work skills at least for 5 minutes four times a day to help with this. Also worked on reconstructing \"should\" statements, which she was receptive to. No new symptoms or safety concerns. Return in 2 weeks or sooner if needed.  During this session, this clinician used the following therapeutic modalities: Cognitive Behavioral Therapy and Cognitive Processing Therapy    Substance Abuse was not addressed during this session. If the client is diagnosed with a co-occurring substance use disorder, please indicate any changes in the frequency or amount of use: NA. Stage of change for addressing substance use diagnoses: No substance use/Not applicable    ASSESSMENT:  Megan Jaime presents with a Euthymic/ normal mood.     her affect is Normal range and intensity, which is congruent, with her mood and the content of the session. The client has made progress on their goals.    During this session the client was oriented to person, place, and time. The client was engaged in the session. The client was able to sustain direct eye contact and was without psychomotor agitation. The client's thought processes were linear and clear.   Megan Jaime presents with a none risk of suicide, none risk of self-harm, and none risk of harm to others.    For any risk assessment that surpasses a \"low\" rating, a safety plan must be developed.    A safety plan was indicated: no  If yes, describe in detail NA    PLAN: Between sessions, Megan Jaime will take medication as prescribed " and practice positive coping strategies as needed . At the next session, the therapist will use Cognitive Behavioral Therapy and Cognitive Processing Therapy to address stressors.    Behavioral Health Treatment Plan and Discharge Planning: Megan Jaime is aware of and agrees to continue to work on their treatment plan. They have identified and are working toward their discharge goals. yes    Visit start and stop times:    07/19/24  Start Time: 1300  Stop Time: 1400  Total Visit Time: 60 minutes

## 2024-07-22 ENCOUNTER — PATIENT MESSAGE (OUTPATIENT)
Dept: FAMILY MEDICINE CLINIC | Facility: CLINIC | Age: 44
End: 2024-07-22

## 2024-07-24 DIAGNOSIS — E78.1 HYPERTRIGLYCERIDEMIA: ICD-10-CM

## 2024-07-24 RX ORDER — FENOFIBRATE 145 MG/1
145 TABLET, COATED ORAL DAILY
Qty: 100 TABLET | Refills: 1 | Status: SHIPPED | OUTPATIENT
Start: 2024-07-24

## 2024-07-29 ENCOUNTER — TELEPHONE (OUTPATIENT)
Age: 44
End: 2024-07-29

## 2024-07-29 NOTE — TELEPHONE ENCOUNTER
Pt called and stated she can not do the Wednesday 10am appts through the end of Summer. She has a commitment with her daughter.    We tried to find another appt for her this week and next but could not find anything that she could do. I am not sure if she can do the appts from Sept on for this day and time.     She would like you to reach out to her so you both can figure out a different day and time for her.

## 2024-07-30 LAB
CHOLEST SERPL-MCNC: 167 MG/DL
CHOLEST/HDLC SERPL: 3.9 (CALC)
HDLC SERPL-MCNC: 43 MG/DL
LDLC SERPL CALC-MCNC: 95 MG/DL (CALC)
NONHDLC SERPL-MCNC: 124 MG/DL (CALC)
TRIGL SERPL-MCNC: 191 MG/DL

## 2024-08-02 DIAGNOSIS — E78.2 ELEVATED TRIGLYCERIDES WITH HIGH CHOLESTEROL: Primary | ICD-10-CM

## 2024-08-08 ENCOUNTER — TELEPHONE (OUTPATIENT)
Dept: PSYCHIATRY | Facility: CLINIC | Age: 44
End: 2024-08-08

## 2024-08-08 NOTE — TELEPHONE ENCOUNTER
Lvm for pt to make an appt with Lalita Tirado for these dates 08/13/24 ,08/14/24 at 8 am 8/14/24 @ 10 am or 0815/24 @ 11 am

## 2024-08-14 ENCOUNTER — TELEMEDICINE (OUTPATIENT)
Dept: BEHAVIORAL/MENTAL HEALTH CLINIC | Facility: CLINIC | Age: 44
End: 2024-08-14
Payer: COMMERCIAL

## 2024-08-14 DIAGNOSIS — F41.9 ANXIETY: ICD-10-CM

## 2024-08-14 DIAGNOSIS — F33.0 MAJOR DEPRESSIVE DISORDER, RECURRENT, MILD (HCC): Primary | ICD-10-CM

## 2024-08-14 PROCEDURE — 90834 PSYTX W PT 45 MINUTES: CPT | Performed by: SOCIAL WORKER

## 2024-08-14 NOTE — PSYCH
Virtual Regular Visit    Verification of patient location:    Patient is located at Home in the following state in which I hold an active license [place]      Assessment/Plan:    Problem List Items Addressed This Visit       Anxiety     Other Visit Diagnoses       Major depressive disorder, recurrent, mild (HCC)    -  Primary            Goals addressed in session: Goal 1          Reason for visit is No chief complaint on file.       Encounter provider Lalita Tirado LCSW      Recent Visits  Date Type Provider Dept   08/08/24 Telephone Lalita Tirado LCSW [facility]   Showing recent visits within past 7 days and meeting all other requirements  Today's Visits  Date Type Provider Dept   08/14/24 Telemedicine Lalita Tirado LCSW [facility]   Showing today's visits and meeting all other requirements  Future Appointments  No visits were found meeting these conditions.  Showing future appointments within next 150 days and meeting all other requirements       The patient was identified by name and date of birth. Megan Jaime was informed that this is a telemedicine visit and that the visit is being conducted throughthe Epic Embedded platform. She agrees to proceed..  My office door was closed. No one else was in the room.  She acknowledged consent and understanding of privacy and security of the video platform. The patient has agreed to participate and understands they can discontinue the visit at any time.    Patient is aware this is a billable service.     Subjective  Megan Jaime is a 44 y.o. female  .      HPI     Past Medical History:   Diagnosis Date    Anxiety 12/8/2020    Attention deficit hyperactivity disorder (ADHD), predominantly hyperactive type 3/10/2022    Lumbar degenerative disc disease 3/10/2022    Patient denies medical problems     Seasonal allergic rhinitis 11/30/2022    Stress headaches 12/8/2020       Past Surgical History:    Procedure Laterality Date     SECTION         Current Outpatient Medications   Medication Sig Dispense Refill    amphetamine-dextroamphetamine (ADDERALL, 20MG,) 20 mg tablet Take 1 tablet (20 mg total) by mouth 2 (two) times a day Max Daily Amount: 40 mg 60 tablet 0    Semaglutide-Weight Management (Wegovy) 1 MG/0.5ML Inject 1 mg under the skin weekly 2 mL 0    cariprazine (Vraylar) 1.5 MG capsule Take 1 capsule by mouth once daily 30 capsule 3    fenofibrate (TRICOR) 145 mg tablet Take 1 tablet by mouth once daily 100 tablet 1    fluticasone (FLONASE) 50 mcg/act nasal spray 1 spray into each nostril daily      VAHE FE 1.5/30 1.5-30 MG-MCG tablet   0    LORazepam (ATIVAN) 0.5 mg tablet Take 0.5 mg by mouth daily as needed      MICROGESTIN 1.5-30 MG-MCG TABS  (Patient not taking: Reported on 2024)      multivitamin (THERAGRAN) TABS Take 1 tablet by mouth daily      vortioxetine (Trintellix) 10 MG tablet Take 1 tablet (10 mg total) by mouth daily (Patient not taking: Reported on 2024) 30 tablet 3    Vortioxetine HBr (Trintellix) 20 MG tablet Take 1 tablet (20 mg total) by mouth daily 30 tablet 3     No current facility-administered medications for this visit.        No Known Allergies    Review of Systems    Video Exam    There were no vitals filed for this visit.    Physical Exam     Visit Time    Visit Start Time: 08:00   Visit Stop Time: 08:50  Total Visit Duration:  50 minutes              Behavioral Health Psychotherapy Progress Note    Psychotherapy Provided: Individual Psychotherapy     1. Major depressive disorder, recurrent, mild (HCC)        2. Anxiety            Goals addressed in session: Goal 1     DATA: Megan reports feeling well overall. She has been enjoying spending time with her children daily at the pool. She is no longer conflicted with how she spends her time stating that she is feeling a lot less anxious and stressed. She believes that her medication is helping her. As such  "she reports wanting to discontinue counseling stating that she doesn't really have anything that she wishes to work on, that she feels better, and knows that she can call back at any time to resume sessions. She understands that her case will be closed. There are no safety concerns and additional recommendations for level of care. She should continue to follow up with medication monitoring if that is what she feels is working for her.   During this session, this clinician used the following therapeutic modalities: Supportive Psychotherapy    Substance Abuse was not addressed during this session. If the client is diagnosed with a co-occurring substance use disorder, please indicate any changes in the frequency or amount of use: NA. Stage of change for addressing substance use diagnoses: No substance use/Not applicable    ASSESSMENT:  Megan Jaime presents with a Euthymic/ normal mood.     her affect is Normal range and intensity, which is congruent, with her mood and the content of the session. The client has made progress on their goals.    During this session the client was oriented to person, place, and time. The client was engaged in the session. The client was able to sustain direct eye contact and was without psychomotor agitation. The client's thought processes were linear and clear.   Megan Jaime presents with a none risk of suicide, none risk of self-harm, and none risk of harm to others.    For any risk assessment that surpasses a \"low\" rating, a safety plan must be developed.    A safety plan was indicated: no  If yes, describe in detail NA    PLAN: Megan will be closed with this writer. She should continue with medication monitoring. She understands that she may call back to resume sessions at any time.     Behavioral Health Treatment Plan and Discharge Planning: Megan Jaime is aware of and agrees to continue to work on their treatment plan. They have identified and are working toward their " discharge goals. yes    Visit start and stop times:    08/14/24  Start Time: 0800  Stop Time: 0850  Total Visit Time: 50 minutes

## 2024-08-16 ENCOUNTER — DOCUMENTATION (OUTPATIENT)
Dept: BEHAVIORAL/MENTAL HEALTH CLINIC | Facility: CLINIC | Age: 44
End: 2024-08-16

## 2024-08-16 DIAGNOSIS — F41.9 ANXIETY: ICD-10-CM

## 2024-08-16 DIAGNOSIS — F33.0 MAJOR DEPRESSIVE DISORDER, RECURRENT, MILD (HCC): Primary | ICD-10-CM

## 2024-08-16 NOTE — PROGRESS NOTES
"Psychotherapy Discharge Summary    Preferred Name: Megan Jaime  YOB: 1980    Admission date to psychotherapy: 7/3/2024    Referred by: Self    Presenting Problem: Megan struggles with anxiety and depression marked by excessive crying, excessive worry, feeling panicky all of the time, second guessing of decisions, and low mood. She reports feeling this way off and on most of her life due to her parents having \"the marriage from hell,\" which now seems to be triggered through her oldest daughter being bullied. Megan is struggling with parenting in middle age without the help and guidance from her own parents     Course of treatment included : individual therapy     Progress/Outcome of Treatment Goals (brief summary of course of treatment) Megan attended 3 sessions with this writer wherein managing assessing and addressing automatic negative thought patterns was the focus of care. She worked cooperatively with this writer at exploring functions of over thinking and learning ways to ground herself in the moment and soothe herself through discomforts. She ultimately withdrew from treatment because she felt asymptomatic and did not feel the need to continue counseling.     Treatment Complications (if any): none    Treatment Progress: good    Current [facility] Provider: Law    Discharge Medications include: See med list.     Discharge Date: 8/16/2024    Discharge Diagnosis:   1. Major depressive disorder, recurrent, mild (HCC)        2. Anxiety            Criteria for Discharge: completed treatment goals and objectives and is no longer in need of services    Aftercare recommendations include (include specific referral names and phone numbers, if appropriate): Continue with medication management. Return to counseling if needed.     Prognosis: good    "

## 2024-08-20 DIAGNOSIS — F41.1 GENERALIZED ANXIETY DISORDER: ICD-10-CM

## 2024-08-20 DIAGNOSIS — F41.0 PANIC ATTACK: ICD-10-CM

## 2024-08-20 RX ORDER — VORTIOXETINE 10 MG/1
10 TABLET, FILM COATED ORAL DAILY
Qty: 30 TABLET | Refills: 0 | Status: SHIPPED | OUTPATIENT
Start: 2024-08-20

## 2024-08-28 DIAGNOSIS — E66.09 CLASS 1 OBESITY DUE TO EXCESS CALORIES WITH SERIOUS COMORBIDITY AND BODY MASS INDEX (BMI) OF 32.0 TO 32.9 IN ADULT: ICD-10-CM

## 2024-08-29 RX ORDER — SEMAGLUTIDE 1.7 MG/.75ML
INJECTION, SOLUTION SUBCUTANEOUS
Qty: 3 ML | Refills: 0 | Status: SHIPPED | OUTPATIENT
Start: 2024-08-29

## 2024-09-02 DIAGNOSIS — F90.1 ATTENTION DEFICIT HYPERACTIVITY DISORDER (ADHD), PREDOMINANTLY HYPERACTIVE TYPE: ICD-10-CM

## 2024-09-03 DIAGNOSIS — F33.1 MODERATE EPISODE OF RECURRENT MAJOR DEPRESSIVE DISORDER (HCC): ICD-10-CM

## 2024-09-03 RX ORDER — DEXTROAMPHETAMINE SACCHARATE, AMPHETAMINE ASPARTATE, DEXTROAMPHETAMINE SULFATE AND AMPHETAMINE SULFATE 5; 5; 5; 5 MG/1; MG/1; MG/1; MG/1
20 TABLET ORAL
Qty: 60 TABLET | Refills: 0 | Status: SHIPPED | OUTPATIENT
Start: 2024-09-03

## 2024-09-03 NOTE — TELEPHONE ENCOUNTER
Reason for call:   [x] Refill   [] Prior Auth  [] Other:     Office:   [] PCP/Provider -   [x] Specialty/Provider -[facility]      Medication: Vortioxetine HBr (Trintellix) 20 MG Take 1 tablet (20 mg total) by mouth daily       Pharmacy: [facility] [place]     Does the patient have enough for 3 days?   [x] Yes   [] No - Send as HP to POD

## 2024-09-18 DIAGNOSIS — F41.0 PANIC ATTACK: ICD-10-CM

## 2024-09-18 DIAGNOSIS — F41.1 GENERALIZED ANXIETY DISORDER: ICD-10-CM

## 2024-09-18 NOTE — TELEPHONE ENCOUNTER
Called patient but had to leave voice mail message for patient to call back to schedule a follow up appointment with Dr Sivan Foy.  Patient was last seen 6/18/2024.

## 2024-09-21 DIAGNOSIS — E66.811 CLASS 1 OBESITY DUE TO EXCESS CALORIES WITH SERIOUS COMORBIDITY AND BODY MASS INDEX (BMI) OF 32.0 TO 32.9 IN ADULT: ICD-10-CM

## 2024-09-21 DIAGNOSIS — E66.09 CLASS 1 OBESITY DUE TO EXCESS CALORIES WITH SERIOUS COMORBIDITY AND BODY MASS INDEX (BMI) OF 32.0 TO 32.9 IN ADULT: ICD-10-CM

## 2024-09-23 RX ORDER — SEMAGLUTIDE 1.7 MG/.75ML
INJECTION, SOLUTION SUBCUTANEOUS
Qty: 4 ML | Refills: 0 | Status: SHIPPED | OUTPATIENT
Start: 2024-09-23

## 2024-09-25 ENCOUNTER — TELEPHONE (OUTPATIENT)
Age: 44
End: 2024-09-25

## 2024-09-25 NOTE — TELEPHONE ENCOUNTER
Patient contacted the office to schedule a follow up visit with provider. Patient is now scheduled for 10/3/24  at 1:30pm virtually.

## 2024-09-29 DIAGNOSIS — E78.1 HYPERTRIGLYCERIDEMIA: ICD-10-CM

## 2024-09-30 DIAGNOSIS — F33.1 MODERATE EPISODE OF RECURRENT MAJOR DEPRESSIVE DISORDER (HCC): ICD-10-CM

## 2024-09-30 RX ORDER — FENOFIBRATE 145 MG/1
145 TABLET, COATED ORAL DAILY
Qty: 100 TABLET | Refills: 1 | Status: SHIPPED | OUTPATIENT
Start: 2024-09-30

## 2024-09-30 RX ORDER — VORTIOXETINE 20 MG/1
20 TABLET, FILM COATED ORAL DAILY
Qty: 30 TABLET | Refills: 0 | Status: SHIPPED | OUTPATIENT
Start: 2024-09-30

## 2024-10-01 ENCOUNTER — TELEPHONE (OUTPATIENT)
Dept: PSYCHIATRY | Facility: CLINIC | Age: 44
End: 2024-10-01

## 2024-10-01 NOTE — TELEPHONE ENCOUNTER
Left voicemail for patient to inform them that their appointment for 10/03/24 at 1:30 pm was cancelled due to the provider being out of office.     Patient was rescheduled: YES [] NO [x]  If yes, when was patient rescheduled for:   If no, when is the patient's next appointment:   LVM for pt to R/S with Dr Foy  Patient requesting call back to reschedule: YES [] NO []

## 2024-10-02 ENCOUNTER — TELEPHONE (OUTPATIENT)
Age: 44
End: 2024-10-02

## 2024-10-02 DIAGNOSIS — F90.1 ATTENTION DEFICIT HYPERACTIVITY DISORDER (ADHD), PREDOMINANTLY HYPERACTIVE TYPE: ICD-10-CM

## 2024-10-02 RX ORDER — DEXTROAMPHETAMINE SACCHARATE, AMPHETAMINE ASPARTATE, DEXTROAMPHETAMINE SULFATE AND AMPHETAMINE SULFATE 5; 5; 5; 5 MG/1; MG/1; MG/1; MG/1
20 TABLET ORAL
Qty: 60 TABLET | Refills: 0 | Status: SHIPPED | OUTPATIENT
Start: 2024-10-02

## 2024-10-02 NOTE — TELEPHONE ENCOUNTER
[facility] pharmacy called, per new regulations within [facility] they need some additional information document for Adderall 20 mg before they can dispense.  They are needing to document if patient was ever seen by a Behavioral Health specialist. Please contact [facility] pharmacy at [phone].

## 2024-10-03 NOTE — TELEPHONE ENCOUNTER
Notified [facility] pharmacist that patient actively sees psych, pharmacist advised they will be able to continue to fill her prescription. No further action needed.

## 2024-10-07 ENCOUNTER — TELEMEDICINE (OUTPATIENT)
Dept: FAMILY MEDICINE CLINIC | Facility: CLINIC | Age: 44
End: 2024-10-07
Payer: COMMERCIAL

## 2024-10-07 DIAGNOSIS — R21 RASH AND NONSPECIFIC SKIN ERUPTION: Primary | ICD-10-CM

## 2024-10-07 PROCEDURE — 99213 OFFICE O/P EST LOW 20 MIN: CPT | Performed by: NURSE PRACTITIONER

## 2024-10-07 RX ORDER — PREDNISONE 20 MG/1
20 TABLET ORAL 2 TIMES DAILY WITH MEALS
Qty: 8 TABLET | Refills: 0 | Status: SHIPPED | OUTPATIENT
Start: 2024-10-07 | End: 2024-10-11

## 2024-10-07 NOTE — PROGRESS NOTES
Virtual Regular Visit  Name: Megan Jaime      : 1980      MRN: [id number]  Encounter Provider: MEJIA Quiroz  Encounter Date: 10/7/2024   Encounter department: [facility]    Verification of patient location:    Patient is located at Home in the following state in which I hold an active license [place]    Assessment & Plan  Rash and nonspecific skin eruption    Orders:    predniSONE 20 mg tablet; Take 1 tablet (20 mg total) by mouth 2 (two) times a day with meals for 4 days         Encounter provider MEJIA Quiroz    The patient was identified by name and date of birth. Megan Jaime was informed that this is a telemedicine visit and that the visit is being conducted through the [employer] platform. She agrees to proceed..  My office door was closed. No one else was in the room.  She acknowledged consent and understanding of privacy and security of the video platform. The patient has agreed to participate and understands they can discontinue the visit at any time.    Patient is aware this is a billable service.     History of Present Illness     Rash  This is a new problem. The problem has been gradually worsening since onset. The affected locations include the neck, left ear, right ear, head, torso, right ankle and chest. The problem is mild. The rash is characterized by itchiness (bumpy). She was exposed to nothing. Associated symptoms include itching. Pertinent negatives include no cough, fatigue, fever or shortness of breath. Past treatments include anti-itch cream and antihistamine. The treatment provided mild relief.       History obtained from : patient  Review of Systems   Constitutional:  Negative for chills, fatigue and fever.   HENT:  Negative for trouble swallowing.    Respiratory:  Negative for cough, chest tightness and shortness of breath.    Cardiovascular:  Negative for chest pain.   Skin:  Positive for itching and rash.     Current Outpatient  Medications on File Prior to Visit   Medication Sig Dispense Refill    amphetamine-dextroamphetamine (ADDERALL, 20MG,) 20 mg tablet Take 1 tablet (20 mg total) by mouth 2 (two) times a day Max Daily Amount: 40 mg 60 tablet 0    cariprazine (Vraylar) 1.5 MG capsule Take 1 capsule by mouth once daily 30 capsule 3    fenofibrate (TRICOR) 145 mg tablet Take 1 tablet (145 mg total) by mouth daily 100 tablet 1    fluticasone (FLONASE) 50 mcg/act nasal spray 1 spray into each nostril daily      VAHE FE 1.5/30 1.5-30 MG-MCG tablet   0    LORazepam (ATIVAN) 0.5 mg tablet Take 0.5 mg by mouth daily as needed      MICROGESTIN 1.5-30 MG-MCG TABS  (Patient not taking: Reported on 7/16/2024)      multivitamin (THERAGRAN) TABS Take 1 tablet by mouth daily      Trintellix 10 MG tablet Take 1 tablet by mouth once daily 30 tablet 0    Trintellix 20 MG tablet Take 1 tablet by mouth once daily 30 tablet 0    Wegovy 1.7 MG/0.75ML INJECT 1.7 MG UNDER THE SKIN WEEKLY 4 mL 0     No current facility-administered medications on file prior to visit.          Objective     There were no vitals taken for this visit.  Physical Exam  Vitals reviewed.   Constitutional:       Appearance: Normal appearance.   HENT:      Head: Normocephalic and atraumatic.   Skin:     Findings: Rash present.      Comments: Multiple pinpoint areas of erythema noted on back of neck as seen via virtual media   Neurological:      Mental Status: She is alert and oriented to person, place, and time.   Psychiatric:         Mood and Affect: Mood normal.         Visit Time  Total Visit Duration: 20 minutes

## 2024-10-16 ENCOUNTER — OFFICE VISIT (OUTPATIENT)
Dept: FAMILY MEDICINE CLINIC | Facility: CLINIC | Age: 44
End: 2024-10-16
Payer: COMMERCIAL

## 2024-10-16 VITALS
RESPIRATION RATE: 18 BRPM | WEIGHT: 177.4 LBS | SYSTOLIC BLOOD PRESSURE: 120 MMHG | DIASTOLIC BLOOD PRESSURE: 68 MMHG | TEMPERATURE: 97.2 F | BODY MASS INDEX: 29.56 KG/M2 | OXYGEN SATURATION: 98 % | HEART RATE: 88 BPM | HEIGHT: 65 IN

## 2024-10-16 DIAGNOSIS — E66.811 CLASS 1 OBESITY DUE TO EXCESS CALORIES WITH SERIOUS COMORBIDITY AND BODY MASS INDEX (BMI) OF 32.0 TO 32.9 IN ADULT: Primary | ICD-10-CM

## 2024-10-16 DIAGNOSIS — E66.09 CLASS 1 OBESITY DUE TO EXCESS CALORIES WITH SERIOUS COMORBIDITY AND BODY MASS INDEX (BMI) OF 32.0 TO 32.9 IN ADULT: Primary | ICD-10-CM

## 2024-10-16 PROCEDURE — 99213 OFFICE O/P EST LOW 20 MIN: CPT | Performed by: NURSE PRACTITIONER

## 2024-10-16 RX ORDER — SEMAGLUTIDE 2.4 MG/.75ML
INJECTION, SOLUTION SUBCUTANEOUS
Qty: 3 ML | Refills: 0 | Status: SHIPPED | OUTPATIENT
Start: 2024-10-16

## 2024-10-16 NOTE — PROGRESS NOTES
Assessment/Plan:    1. Class 1 obesity due to excess calories with serious comorbidity and body mass index (BMI) of 32.0 to 32.9 in adult  -     Semaglutide-Weight Management (Wegovy) 2.4 MG/0.75ML; Inject 2.4 mg under the skin weekly            Return in about 3 months (around 1/16/2025) for Recheck - weight, on wegovy.    Subjective:      Patient ID: Megan Jaime is a 44 y.o. female.    Chief Complaint   Patient presents with    Follow-up     3 month weight check, [place]/JUAQUIN       Megan is a 44 year old female who presents to the office for a recheck of weight. Pt reports that she is tolerating wegovy without issues at this time. Denies any acute complaints.         The following portions of the patient's history were reviewed and updated as appropriate: allergies, current medications, past family history, past medical history, past social history, past surgical history and problem list.    Review of Systems   Constitutional:  Negative for chills, fatigue and fever.   Respiratory:  Negative for cough, chest tightness and shortness of breath.    Cardiovascular:  Negative for chest pain.         Current Outpatient Medications   Medication Sig Dispense Refill    amphetamine-dextroamphetamine (ADDERALL, 20MG,) 20 mg tablet Take 1 tablet (20 mg total) by mouth 2 (two) times a day Max Daily Amount: 40 mg 60 tablet 0    cariprazine (Vraylar) 1.5 MG capsule Take 1 capsule by mouth once daily 30 capsule 3    fenofibrate (TRICOR) 145 mg tablet Take 1 tablet (145 mg total) by mouth daily 100 tablet 1    fluticasone (FLONASE) 50 mcg/act nasal spray 1 spray into each nostril daily      VAHE FE 1.5/30 1.5-30 MG-MCG tablet   0    LORazepam (ATIVAN) 0.5 mg tablet Take 0.5 mg by mouth daily as needed      multivitamin (THERAGRAN) TABS Take 1 tablet by mouth daily      Semaglutide-Weight Management (Wegovy) 2.4 MG/0.75ML Inject 2.4 mg under the skin weekly 3 mL 0    Trintellix 20 MG tablet Take 1 tablet by mouth once daily 30  "tablet 0     No current facility-administered medications for this visit.       Objective:    /68   Pulse 88   Temp (!) 97.2 °F (36.2 °C) (Temporal)   Resp 18   Ht 5' 5\" (1.651 m)   Wt 80.5 kg (177 lb 6.4 oz)   LMP 09/28/2024 (Approximate)   SpO2 98%   BMI 29.52 kg/m²        Physical Exam  Vitals reviewed.   Constitutional:       Appearance: Normal appearance.   HENT:      Head: Normocephalic and atraumatic.   Cardiovascular:      Rate and Rhythm: Normal rate and regular rhythm.      Heart sounds: Normal heart sounds.   Pulmonary:      Effort: Pulmonary effort is normal.      Breath sounds: Normal breath sounds.   Neurological:      Mental Status: She is alert and oriented to person, place, and time.   Psychiatric:         Mood and Affect: Mood normal.                MEJIA Quiroz    "

## 2024-10-30 DIAGNOSIS — F33.1 MODERATE EPISODE OF RECURRENT MAJOR DEPRESSIVE DISORDER (HCC): ICD-10-CM

## 2024-10-31 DIAGNOSIS — Z13.6 SCREENING FOR HYPERTENSION: ICD-10-CM

## 2024-10-31 DIAGNOSIS — E78.2 ELEVATED TRIGLYCERIDES WITH HIGH CHOLESTEROL: ICD-10-CM

## 2024-10-31 DIAGNOSIS — Z13.29 SCREENING FOR THYROID DISORDER: ICD-10-CM

## 2024-10-31 DIAGNOSIS — Z00.00 ENCOUNTER FOR ANNUAL GENERAL MEDICAL EXAMINATION WITHOUT ABNORMAL FINDINGS IN ADULT: Primary | ICD-10-CM

## 2024-10-31 RX ORDER — VORTIOXETINE 20 MG/1
20 TABLET, FILM COATED ORAL DAILY
Qty: 30 TABLET | Refills: 0 | Status: SHIPPED | OUTPATIENT
Start: 2024-10-31

## 2024-11-04 DIAGNOSIS — F90.1 ATTENTION DEFICIT HYPERACTIVITY DISORDER (ADHD), PREDOMINANTLY HYPERACTIVE TYPE: ICD-10-CM

## 2024-11-06 RX ORDER — DEXTROAMPHETAMINE SACCHARATE, AMPHETAMINE ASPARTATE, DEXTROAMPHETAMINE SULFATE AND AMPHETAMINE SULFATE 5; 5; 5; 5 MG/1; MG/1; MG/1; MG/1
20 TABLET ORAL
Qty: 60 TABLET | Refills: 0 | Status: SHIPPED | OUTPATIENT
Start: 2024-11-06

## 2024-11-08 ENCOUNTER — TELEPHONE (OUTPATIENT)
Age: 44
End: 2024-11-08

## 2024-11-08 DIAGNOSIS — Z13.29 SCREENING FOR THYROID DISORDER: ICD-10-CM

## 2024-11-08 DIAGNOSIS — Z13.6 SCREENING FOR HYPERTENSION: ICD-10-CM

## 2024-11-08 DIAGNOSIS — Z13.220 ENCOUNTER FOR SCREENING FOR LIPID DISORDER: ICD-10-CM

## 2024-11-08 DIAGNOSIS — E78.1 HYPERTRIGLYCERIDEMIA: ICD-10-CM

## 2024-11-08 DIAGNOSIS — Z00.00 ENCOUNTER FOR ANNUAL GENERAL MEDICAL EXAMINATION WITHOUT ABNORMAL FINDINGS IN ADULT: Primary | ICD-10-CM

## 2024-11-08 NOTE — TELEPHONE ENCOUNTER
Patient called in regarding lab orders prior to next scheduled OV 12/9 and [facility] has no availability to schedule prior to OV. Patient is requesting lab orders be resent to [facility] so she can schedule to get them done. Please follow up with patient so she can schedule with [facility].

## 2024-11-09 DIAGNOSIS — E66.09 CLASS 1 OBESITY DUE TO EXCESS CALORIES WITH SERIOUS COMORBIDITY AND BODY MASS INDEX (BMI) OF 32.0 TO 32.9 IN ADULT: ICD-10-CM

## 2024-11-09 DIAGNOSIS — E66.811 CLASS 1 OBESITY DUE TO EXCESS CALORIES WITH SERIOUS COMORBIDITY AND BODY MASS INDEX (BMI) OF 32.0 TO 32.9 IN ADULT: ICD-10-CM

## 2024-11-11 RX ORDER — SEMAGLUTIDE 2.4 MG/.75ML
INJECTION, SOLUTION SUBCUTANEOUS
Qty: 4 ML | Refills: 0 | Status: SHIPPED | OUTPATIENT
Start: 2024-11-11

## 2024-11-12 DIAGNOSIS — F39 MOOD DISORDER (HCC): ICD-10-CM

## 2024-11-13 RX ORDER — CARIPRAZINE 1.5 MG/1
1.5 CAPSULE, GELATIN COATED ORAL DAILY
Qty: 30 CAPSULE | Refills: 0 | Status: SHIPPED | OUTPATIENT
Start: 2024-11-13

## 2024-11-21 DIAGNOSIS — F33.1 MODERATE EPISODE OF RECURRENT MAJOR DEPRESSIVE DISORDER (HCC): ICD-10-CM

## 2024-11-22 NOTE — TELEPHONE ENCOUNTER
Last refill was 10/21. Forwarding to primary provider for review upon return.    clear to auscultation bilaterally/no rales/no wheezes

## 2024-11-23 DIAGNOSIS — F90.1 ATTENTION DEFICIT HYPERACTIVITY DISORDER (ADHD), PREDOMINANTLY HYPERACTIVE TYPE: ICD-10-CM

## 2024-11-23 RX ORDER — VORTIOXETINE 20 MG/1
20 TABLET, FILM COATED ORAL DAILY
Qty: 30 TABLET | Refills: 0 | Status: SHIPPED | OUTPATIENT
Start: 2024-11-23

## 2024-11-24 LAB
ALBUMIN SERPL-MCNC: 4.1 G/DL (ref 3.6–5.1)
ALBUMIN/GLOB SERPL: 1.7 (CALC) (ref 1–2.5)
ALP SERPL-CCNC: 42 U/L (ref 31–125)
ALT SERPL-CCNC: 12 U/L (ref 6–29)
AST SERPL-CCNC: 16 U/L (ref 10–30)
BASOPHILS # BLD AUTO: 71 CELLS/UL (ref 0–200)
BASOPHILS NFR BLD AUTO: 1 %
BILIRUB SERPL-MCNC: 0.4 MG/DL (ref 0.2–1.2)
BUN SERPL-MCNC: 10 MG/DL (ref 7–25)
BUN/CREAT SERPL: NORMAL (CALC) (ref 6–22)
CALCIUM SERPL-MCNC: 9.3 MG/DL (ref 8.6–10.2)
CHLORIDE SERPL-SCNC: 105 MMOL/L (ref 98–110)
CHOLEST SERPL-MCNC: 155 MG/DL
CHOLEST/HDLC SERPL: 3.9 (CALC)
CO2 SERPL-SCNC: 26 MMOL/L (ref 20–32)
CREAT SERPL-MCNC: 0.86 MG/DL (ref 0.5–0.99)
EOSINOPHIL # BLD AUTO: 121 CELLS/UL (ref 15–500)
EOSINOPHIL NFR BLD AUTO: 1.7 %
ERYTHROCYTE [DISTWIDTH] IN BLOOD BY AUTOMATED COUNT: 12.4 % (ref 11–15)
GFR/BSA.PRED SERPLBLD CYS-BASED-ARV: 86 ML/MIN/1.73M2
GLOBULIN SER CALC-MCNC: 2.4 G/DL (CALC) (ref 1.9–3.7)
GLUCOSE SERPL-MCNC: 78 MG/DL (ref 65–99)
HCT VFR BLD AUTO: 40.1 % (ref 35–45)
HDLC SERPL-MCNC: 40 MG/DL
HGB BLD-MCNC: 13.1 G/DL (ref 11.7–15.5)
LDLC SERPL CALC-MCNC: 97 MG/DL (CALC)
LYMPHOCYTES # BLD AUTO: 2876 CELLS/UL (ref 850–3900)
LYMPHOCYTES NFR BLD AUTO: 40.5 %
MCH RBC QN AUTO: 28.9 PG (ref 27–33)
MCHC RBC AUTO-ENTMCNC: 32.7 G/DL (ref 32–36)
MCV RBC AUTO: 88.5 FL (ref 80–100)
MONOCYTES # BLD AUTO: 561 CELLS/UL (ref 200–950)
MONOCYTES NFR BLD AUTO: 7.9 %
NEUTROPHILS # BLD AUTO: 3472 CELLS/UL (ref 1500–7800)
NEUTROPHILS NFR BLD AUTO: 48.9 %
NONHDLC SERPL-MCNC: 115 MG/DL (CALC)
PLATELET # BLD AUTO: 346 THOUSAND/UL (ref 140–400)
PMV BLD REES-ECKER: 9.4 FL (ref 7.5–12.5)
POTASSIUM SERPL-SCNC: 4.3 MMOL/L (ref 3.5–5.3)
PROT SERPL-MCNC: 6.5 G/DL (ref 6.1–8.1)
RBC # BLD AUTO: 4.53 MILLION/UL (ref 3.8–5.1)
SODIUM SERPL-SCNC: 138 MMOL/L (ref 135–146)
TRIGL SERPL-MCNC: 88 MG/DL
TSH SERPL-ACNC: 1.31 MIU/L
WBC # BLD AUTO: 7.1 THOUSAND/UL (ref 3.8–10.8)

## 2024-11-25 RX ORDER — DEXTROAMPHETAMINE SACCHARATE, AMPHETAMINE ASPARTATE, DEXTROAMPHETAMINE SULFATE AND AMPHETAMINE SULFATE 5; 5; 5; 5 MG/1; MG/1; MG/1; MG/1
20 TABLET ORAL
Qty: 60 TABLET | Refills: 0 | Status: SHIPPED | OUTPATIENT
Start: 2024-11-25

## 2024-11-27 ENCOUNTER — TELEPHONE (OUTPATIENT)
Dept: PSYCHIATRY | Facility: CLINIC | Age: 44
End: 2024-11-27

## 2024-11-27 NOTE — TELEPHONE ENCOUNTER
----- Message from Sivan Foy, PhD sent at 11/23/2024 12:59 PM EST -----  Please call patient and schedule an appointment. Thank you

## 2024-12-06 DIAGNOSIS — E66.09 CLASS 1 OBESITY DUE TO EXCESS CALORIES WITH SERIOUS COMORBIDITY AND BODY MASS INDEX (BMI) OF 32.0 TO 32.9 IN ADULT: ICD-10-CM

## 2024-12-06 DIAGNOSIS — E66.811 CLASS 1 OBESITY DUE TO EXCESS CALORIES WITH SERIOUS COMORBIDITY AND BODY MASS INDEX (BMI) OF 32.0 TO 32.9 IN ADULT: ICD-10-CM

## 2024-12-06 RX ORDER — SEMAGLUTIDE 2.4 MG/.75ML
INJECTION, SOLUTION SUBCUTANEOUS
Qty: 4 ML | Refills: 0 | Status: SHIPPED | OUTPATIENT
Start: 2024-12-06

## 2024-12-09 DIAGNOSIS — F39 MOOD DISORDER (HCC): ICD-10-CM

## 2024-12-09 RX ORDER — CARIPRAZINE 1.5 MG/1
1.5 CAPSULE, GELATIN COATED ORAL DAILY
Qty: 30 CAPSULE | Refills: 0 | Status: SHIPPED | OUTPATIENT
Start: 2024-12-09

## 2024-12-10 ENCOUNTER — TELEPHONE (OUTPATIENT)
Dept: PSYCHIATRY | Facility: CLINIC | Age: 44
End: 2024-12-10

## 2024-12-10 ENCOUNTER — RESULTS FOLLOW-UP (OUTPATIENT)
Dept: FAMILY MEDICINE CLINIC | Facility: CLINIC | Age: 44
End: 2024-12-10

## 2024-12-10 NOTE — TELEPHONE ENCOUNTER
Called patient per request of provider, CLAUDE Lancaster but had to leave voicemail message to schedule follow up appointment with Dr Sivan Foy.  Left call back # [phone].

## 2024-12-11 ENCOUNTER — TELEPHONE (OUTPATIENT)
Dept: PSYCHIATRY | Facility: CLINIC | Age: 44
End: 2024-12-11

## 2024-12-12 ENCOUNTER — OFFICE VISIT (OUTPATIENT)
Dept: FAMILY MEDICINE CLINIC | Facility: CLINIC | Age: 44
End: 2024-12-12
Payer: COMMERCIAL

## 2024-12-12 VITALS
BODY MASS INDEX: 27.26 KG/M2 | WEIGHT: 169.6 LBS | SYSTOLIC BLOOD PRESSURE: 100 MMHG | DIASTOLIC BLOOD PRESSURE: 70 MMHG | HEIGHT: 66 IN | OXYGEN SATURATION: 99 % | HEART RATE: 85 BPM | TEMPERATURE: 98 F | RESPIRATION RATE: 16 BRPM

## 2024-12-12 DIAGNOSIS — Z00.00 ANNUAL PHYSICAL EXAM: Primary | ICD-10-CM

## 2024-12-12 DIAGNOSIS — Z12.31 ENCOUNTER FOR SCREENING MAMMOGRAM FOR MALIGNANT NEOPLASM OF BREAST: ICD-10-CM

## 2024-12-12 PROCEDURE — 99396 PREV VISIT EST AGE 40-64: CPT | Performed by: STUDENT IN AN ORGANIZED HEALTH CARE EDUCATION/TRAINING PROGRAM

## 2024-12-12 RX ORDER — NORETHINDRONE ACETATE AND ETHINYL ESTRADIOL 1.5; 3 MG/1; UG/1
1 TABLET ORAL DAILY
COMMUNITY
Start: 2024-11-26

## 2024-12-12 NOTE — PATIENT INSTRUCTIONS
"Patient Education     Routine physical for adults   The Basics   Written by the doctors and editors at [facility]   What is a physical? -- A physical is a routine visit, or \"check-up,\" with your doctor. You might also hear it called a \"wellness visit\" or \"preventive visit.\"  During each visit, the doctor will:   Ask about your physical and mental health   Ask about your habits, behaviors, and lifestyle   Do an exam   Give you vaccines if needed   Talk to you about any medicines you take   Give advice about your health   Answer your questions  Getting regular check-ups is an important part of taking care of your health. It can help your doctor find and treat any problems you have. But it's also important for preventing health problems.  A routine physical is different from a \"sick visit.\" A sick visit is when you see a doctor because of a health concern or problem. Since physicals are scheduled ahead of time, you can think about what you want to ask the doctor.  How often should I get a physical? -- It depends on your age and health. In general, for people age 21 years and older:   If you are younger than 50 years, you might be able to get a physical every 3 years.   If you are 50 years or older, your doctor might recommend a physical every year.  If you have an ongoing health condition, like diabetes or high blood pressure, your doctor will probably want to see you more often.  What happens during a physical? -- In general, each visit will include:   Physical exam - The doctor or nurse will check your height, weight, heart rate, and blood pressure. They will also look at your eyes and ears. They will ask about how you are feeling and whether you have any symptoms that bother you.   Medicines - It's a good idea to bring a list of all the medicines you take to each doctor visit. Your doctor will talk to you about your medicines and answer any questions. Tell them if you are having any side effects that bother you. You " "should also tell them if you are having trouble paying for any of your medicines.   Habits and behaviors - This includes:   Your diet   Your exercise habits   Whether you smoke, drink alcohol, or use drugs   Whether you are sexually active   Whether you feel safe at home  Your doctor will talk to you about things you can do to improve your health and lower your risk of health problems. They will also offer help and support. For example, if you want to quit smoking, they can give you advice and might prescribe medicines. If you want to improve your diet or get more physical activity, they can help you with this, too.   Lab tests, if needed - The tests you get will depend on your age and situation. For example, your doctor might want to check your:   Cholesterol   Blood sugar   Iron level   Vaccines - The recommended vaccines will depend on your age, health, and what vaccines you already had. Vaccines are very important because they can prevent certain serious or deadly infections.   Discussion of screening - \"Screening\" means checking for diseases or other health problems before they cause symptoms. Your doctor can recommend screening based on your age, risk, and preferences. This might include tests to check for:   Cancer, such as breast, prostate, cervical, ovarian, colorectal, prostate, lung, or skin cancer   Sexually transmitted infections, such as chlamydia and gonorrhea   Mental health conditions like depression and anxiety  Your doctor will talk to you about the different types of screening tests. They can help you decide which screenings to have. They can also explain what the results might mean.   Answering questions - The physical is a good time to ask the doctor or nurse questions about your health. If needed, they can refer you to other doctors or specialists, too.  Adults older than 65 years often need other care, too. As you get older, your doctor will talk to you about:   How to prevent falling at " home   Hearing or vision tests   Memory testing   How to take your medicines safely   Making sure that you have the help and support you need at home  All topics are updated as new evidence becomes available and our peer review process is complete.  This topic retrieved from [employer] on: May 02, 2024.  Topic 642134 Version 1.0  Release: 32.4.3 - C32.122  © 2024 UpToDate, Inc. and/or its affiliates. All rights reserved.  Consumer Information Use and Disclaimer   Disclaimer: This generalized information is a limited summary of diagnosis, treatment, and/or medication information. It is not meant to be comprehensive and should be used as a tool to help the user understand and/or assess potential diagnostic and treatment options. It does NOT include all information about conditions, treatments, medications, side effects, or risks that may apply to a specific patient. It is not intended to be medical advice or a substitute for the medical advice, diagnosis, or treatment of a health care provider based on the health care provider's examination and assessment of a patient's specific and unique circumstances. Patients must speak with a health care provider for complete information about their health, medical questions, and treatment options, including any risks or benefits regarding use of medications. This information does not endorse any treatments or medications as safe, effective, or approved for treating a specific patient. UpToDate, Inc. and its affiliates disclaim any warranty or liability relating to this information or the use thereof.The use of this information is governed by the Terms of Use, available at https://www.woltersFonixuwer.com/en/know/clinical-effectiveness-terms. 2024© UpToDate, Inc. and its affiliates and/or licensors. All rights reserved.  Copyright   © 2024 UpToDate, Inc. and/or its affiliates. All rights reserved.

## 2024-12-12 NOTE — PROGRESS NOTES
Adult Annual Physical  Name: Megan Jaime      : 1980      MRN: [id number]  Encounter Provider: Mili Smith MD  Encounter Date: 2024   Encounter department: [facility]    Assessment & Plan  Annual physical exam         Encounter for screening mammogram for malignant neoplasm of breast    Orders:  •  Mammo screening bilateral w 3d and cad; Future      Immunizations and preventive care screenings were discussed with patient today. Appropriate education was printed on patient's after visit summary.    Counseling:  Dental Health: discussed importance of regular tooth brushing, flossing, and dental visits.  Exercise: the importance of regular exercise/physical activity was discussed. Recommend exercise 3-5 times per week for at least 30 minutes.       Depression Screening and Follow-up Plan: Patient was screened for depression during today's encounter. They screened negative with a PHQ-2 score of 0.        History of Present Illness     Adult Annual Physical:  Patient presents for annual physical.     Diet and Physical Activity:  - Diet/Nutrition: poor diet. patient notes intake of lot of cereals. limited fruits and vegetables.  - Exercise: 3-4 times a week on average and moderate cardiovascular exercise.    Depression Screening:  - PHQ-2 Score: 0    General Health:  - Sleep: sleeps well and 7-8 hours of sleep on average.  - Hearing: normal hearing bilateral ears.  - Vision: wears glasses and most recent eye exam < 1 year ago.  - Dental: regular dental visits and brushes teeth once daily.    /GYN Health:  - Follows with GYN: yes.   - Menopause: perimenopausal.   - Last menstrual cycle: 2024.     Review of Systems   Constitutional:  Negative for activity change, chills, diaphoresis, fatigue and fever.   HENT:  Negative for congestion, postnasal drip, rhinorrhea and sore throat.    Respiratory:  Negative for cough, shortness of breath and wheezing.    Cardiovascular:   "Negative for chest pain, palpitations and leg swelling.   Gastrointestinal:  Negative for abdominal pain, constipation, diarrhea, nausea and vomiting.   Musculoskeletal:  Negative for myalgias.   Skin:  Negative for rash.   Neurological:  Negative for weakness, light-headedness and headaches. Tremors: prev.  Psychiatric/Behavioral:  The patient is not nervous/anxious.          Objective   /70   Pulse 85   Temp 98 °F (36.7 °C)   Resp 16   Ht 5' 5.5\" (1.664 m)   Wt 76.9 kg (169 lb 9.6 oz)   LMP 11/26/2024 (Exact Date)   SpO2 99%   BMI 27.79 kg/m²     Physical Exam  Vitals and nursing note reviewed.   Constitutional:       General: She is not in acute distress.     Appearance: She is well-developed.   HENT:      Head: Normocephalic and atraumatic.   Eyes:      Conjunctiva/sclera: Conjunctivae normal.   Cardiovascular:      Rate and Rhythm: Normal rate and regular rhythm.      Heart sounds: No murmur heard.  Pulmonary:      Effort: Pulmonary effort is normal. No respiratory distress.      Breath sounds: Normal breath sounds.   Abdominal:      Palpations: Abdomen is soft.      Tenderness: There is no abdominal tenderness.   Musculoskeletal:         General: No swelling.      Cervical back: Neck supple.   Skin:     General: Skin is warm and dry.      Capillary Refill: Capillary refill takes less than 2 seconds.   Neurological:      Mental Status: She is alert.   Psychiatric:         Mood and Affect: Mood normal.         "

## 2024-12-16 ENCOUNTER — TELEPHONE (OUTPATIENT)
Dept: ADMINISTRATIVE | Facility: OTHER | Age: 44
End: 2024-12-16

## 2024-12-16 DIAGNOSIS — F90.1 ATTENTION DEFICIT HYPERACTIVITY DISORDER (ADHD), PREDOMINANTLY HYPERACTIVE TYPE: ICD-10-CM

## 2024-12-16 RX ORDER — DEXTROAMPHETAMINE SACCHARATE, AMPHETAMINE ASPARTATE, DEXTROAMPHETAMINE SULFATE AND AMPHETAMINE SULFATE 5; 5; 5; 5 MG/1; MG/1; MG/1; MG/1
20 TABLET ORAL
Qty: 60 TABLET | Refills: 0 | Status: SHIPPED | OUTPATIENT
Start: 2024-12-16

## 2024-12-16 NOTE — TELEPHONE ENCOUNTER
----- Message from Cynthia MEDINA sent at 12/13/2024  4:20 PM EST -----  Regarding: Care Gap Requst  12/13/24 4:20 PM    Hello, our patient attached above has had Mammogram completed/performed. Please assist in updating the patient chart by making an External outreach to [place] facility located in [facility] The date of service is 01/2024.    Thank you,  Cynthia Sparks  [facility]

## 2024-12-16 NOTE — TELEPHONE ENCOUNTER
Upon review of the In Basket request we have found this is a duplicate request and no further action is needed. This request was completed upon initial request, the patient chart is up to date, and this message will now be closed. HM already up to date with the requested item.    Any additional questions or concerns should be emailed to the Practice Liaisons via the appropriate education email address, please do not reply via In Basket.    Thank you  Pete Monreal MA   [facility]

## 2024-12-24 DIAGNOSIS — F33.1 MODERATE EPISODE OF RECURRENT MAJOR DEPRESSIVE DISORDER (HCC): ICD-10-CM

## 2024-12-24 RX ORDER — VORTIOXETINE 20 MG/1
20 TABLET, FILM COATED ORAL DAILY
Qty: 30 TABLET | Refills: 0 | Status: SHIPPED | OUTPATIENT
Start: 2024-12-24

## 2025-01-02 DIAGNOSIS — E66.09 CLASS 1 OBESITY DUE TO EXCESS CALORIES WITH SERIOUS COMORBIDITY AND BODY MASS INDEX (BMI) OF 32.0 TO 32.9 IN ADULT: ICD-10-CM

## 2025-01-02 DIAGNOSIS — E66.811 CLASS 1 OBESITY DUE TO EXCESS CALORIES WITH SERIOUS COMORBIDITY AND BODY MASS INDEX (BMI) OF 32.0 TO 32.9 IN ADULT: ICD-10-CM

## 2025-01-06 ENCOUNTER — TELEPHONE (OUTPATIENT)
Dept: PSYCHIATRY | Facility: CLINIC | Age: 45
End: 2025-01-06

## 2025-01-06 DIAGNOSIS — F39 MOOD DISORDER (HCC): ICD-10-CM

## 2025-01-06 RX ORDER — CARIPRAZINE 1.5 MG/1
1.5 CAPSULE, GELATIN COATED ORAL DAILY
Qty: 30 CAPSULE | Refills: 0 | Status: SHIPPED | OUTPATIENT
Start: 2025-01-06

## 2025-01-06 RX ORDER — SEMAGLUTIDE 2.4 MG/.75ML
INJECTION, SOLUTION SUBCUTANEOUS
Qty: 4 ML | Refills: 0 | Status: SHIPPED | OUTPATIENT
Start: 2025-01-06 | End: 2025-01-08

## 2025-01-08 ENCOUNTER — TELEMEDICINE (OUTPATIENT)
Dept: FAMILY MEDICINE CLINIC | Facility: CLINIC | Age: 45
End: 2025-01-08
Payer: COMMERCIAL

## 2025-01-08 DIAGNOSIS — E78.2 ELEVATED TRIGLYCERIDES WITH HIGH CHOLESTEROL: ICD-10-CM

## 2025-01-08 DIAGNOSIS — E66.811 CLASS 1 OBESITY DUE TO EXCESS CALORIES WITH SERIOUS COMORBIDITY AND BODY MASS INDEX (BMI) OF 32.0 TO 32.9 IN ADULT: Primary | ICD-10-CM

## 2025-01-08 DIAGNOSIS — E66.09 CLASS 1 OBESITY DUE TO EXCESS CALORIES WITH SERIOUS COMORBIDITY AND BODY MASS INDEX (BMI) OF 32.0 TO 32.9 IN ADULT: Primary | ICD-10-CM

## 2025-01-08 PROCEDURE — 99213 OFFICE O/P EST LOW 20 MIN: CPT | Performed by: STUDENT IN AN ORGANIZED HEALTH CARE EDUCATION/TRAINING PROGRAM

## 2025-01-08 RX ORDER — TIRZEPATIDE 2.5 MG/.5ML
2.5 INJECTION, SOLUTION SUBCUTANEOUS WEEKLY
Qty: 2 ML | Refills: 0 | Status: SHIPPED | OUTPATIENT
Start: 2025-01-08 | End: 2025-01-16

## 2025-01-08 RX ORDER — TIRZEPATIDE 7.5 MG/.5ML
7.5 INJECTION, SOLUTION SUBCUTANEOUS WEEKLY
Qty: 2 ML | Refills: 0 | Status: SHIPPED | OUTPATIENT
Start: 2025-03-05 | End: 2025-01-16

## 2025-01-08 RX ORDER — TIRZEPATIDE 10 MG/.5ML
10 INJECTION, SOLUTION SUBCUTANEOUS WEEKLY
Qty: 2 ML | Refills: 0 | Status: SHIPPED | OUTPATIENT
Start: 2025-04-02 | End: 2025-01-16

## 2025-01-08 RX ORDER — TIRZEPATIDE 5 MG/.5ML
5 INJECTION, SOLUTION SUBCUTANEOUS WEEKLY
Qty: 2 ML | Refills: 0 | Status: SHIPPED | OUTPATIENT
Start: 2025-02-05 | End: 2025-01-16

## 2025-01-08 RX ORDER — TIRZEPATIDE 12.5 MG/.5ML
12.5 INJECTION, SOLUTION SUBCUTANEOUS WEEKLY
Qty: 2 ML | Refills: 0 | Status: SHIPPED | OUTPATIENT
Start: 2025-04-30 | End: 2025-01-16

## 2025-01-08 RX ORDER — TIRZEPATIDE 15 MG/.5ML
15 INJECTION, SOLUTION SUBCUTANEOUS WEEKLY
Qty: 6 ML | Refills: 0 | Status: SHIPPED | OUTPATIENT
Start: 2025-05-28 | End: 2025-01-16

## 2025-01-08 NOTE — PROGRESS NOTES
Virtual Brief Visit  Name: Megan Jaime      : 1980      MRN: [id number]  Encounter Provider: Mili Smith MD  Encounter Date: 2025   Encounter department: [facility]    This Visit is being completed by telephone. The Patient is located at Home and in the following state in which I hold an active license [place]    The patient was identified by name and date of birth. Megan Jaime was informed that this is a telemedicine visit and that the visit is being conducted through the Epic Embedded platform. She agrees to proceed..  My office door was closed. No one else was in the room.  She acknowledged consent and understanding of privacy and security of the video platform. The patient has agreed to participate and understands they can discontinue the visit at any time.    Patient is aware this is a billable service.     :  Assessment & Plan  Class 1 obesity due to excess calories with serious comorbidity and body mass index (BMI) of 32.0 to 32.9 in adult    Orders:  •  Zepbound 2.5 MG/0.5ML auto-injector; Inject 0.5 mL (2.5 mg total) under the skin once a week  •  Zepbound 5 MG/0.5ML auto-injector; Inject 0.5 mL (5 mg total) under the skin once a week for 28 days Do not start before 2025.  •  Zepbound 7.5 MG/0.5ML auto-injector; Inject 0.5 mL (7.5 mg total) under the skin once a week for 28 days Do not start before 2025.  •  Zepbound 10 MG/0.5ML auto-injector; Inject 0.5 mL (10 mg total) under the skin once a week for 28 days Do not start before 2025.  •  Zepbound 12.5 MG/0.5ML auto-injector; Inject 0.5 mL (12.5 mg total) under the skin once a week for 28 days Do not start before 2025.  •  Zepbound 15 MG/0.5ML auto-injector; Inject 0.5 mL (15 mg total) under the skin once a week Do not start before May 28, 2025.    Elevated triglycerides with high cholesterol    Orders:  •  Zepbound 2.5 MG/0.5ML auto-injector; Inject 0.5 mL (2.5 mg total)  under the skin once a week  •  Zepbound 5 MG/0.5ML auto-injector; Inject 0.5 mL (5 mg total) under the skin once a week for 28 days Do not start before February 5, 2025.  •  Zepbound 7.5 MG/0.5ML auto-injector; Inject 0.5 mL (7.5 mg total) under the skin once a week for 28 days Do not start before March 5, 2025.  •  Zepbound 10 MG/0.5ML auto-injector; Inject 0.5 mL (10 mg total) under the skin once a week for 28 days Do not start before April 2, 2025.  •  Zepbound 12.5 MG/0.5ML auto-injector; Inject 0.5 mL (12.5 mg total) under the skin once a week for 28 days Do not start before April 30, 2025.  •  Zepbound 15 MG/0.5ML auto-injector; Inject 0.5 mL (15 mg total) under the skin once a week Do not start before May 28, 2025.    -Discussed with patient that she may start Zepbound after one week. Discussed side effects associated with medication.     History of Present Illness   HPI      Patient presents to discuss change in weight loss medication. She notes she has hit a plateau with Wegovy and would like to try Zepbound. She denies any concerns with medications.  Denies nausea, vomiting and diarrhea.     Visit Time  Total Visit Duration: 20

## 2025-01-08 NOTE — ASSESSMENT & PLAN NOTE
Orders:  •  Zepbound 2.5 MG/0.5ML auto-injector; Inject 0.5 mL (2.5 mg total) under the skin once a week  •  Zepbound 5 MG/0.5ML auto-injector; Inject 0.5 mL (5 mg total) under the skin once a week for 28 days Do not start before February 5, 2025.  •  Zepbound 7.5 MG/0.5ML auto-injector; Inject 0.5 mL (7.5 mg total) under the skin once a week for 28 days Do not start before March 5, 2025.  •  Zepbound 10 MG/0.5ML auto-injector; Inject 0.5 mL (10 mg total) under the skin once a week for 28 days Do not start before April 2, 2025.  •  Zepbound 12.5 MG/0.5ML auto-injector; Inject 0.5 mL (12.5 mg total) under the skin once a week for 28 days Do not start before April 30, 2025.  •  Zepbound 15 MG/0.5ML auto-injector; Inject 0.5 mL (15 mg total) under the skin once a week Do not start before May 28, 2025.

## 2025-01-09 ENCOUNTER — TELEPHONE (OUTPATIENT)
Age: 45
End: 2025-01-09

## 2025-01-09 NOTE — TELEPHONE ENCOUNTER
Patient called wanting Dr. Marroquin to call her regarding the preauthorization for her medication Zepbound. The Zepbound got denied and the patient wants to know why.  The patient is currently on Wegovy. The patient would like some info from provider.

## 2025-01-09 NOTE — TELEPHONE ENCOUNTER
PA Zepbound 2.5 MG/0.5ML SUBMITTED     to [employer]     via    []CMM-KEY:    [x]Surescripts-Case ID # [id number]  []Availity-Auth ID #  NDC #    []Faxed to plan   []Other website    []Phone call Case ID #      []PA sent as URGENT    All office notes, labs and other pertaining documents and studies sent. Clinical questions answered. Awaiting determination from insurance company.     Turnaround time for your insurance to make a decision on your Prior Authorization can take 7-21 business days.

## 2025-01-10 NOTE — TELEPHONE ENCOUNTER
I verbally spoke to Dr Smith.  Zepbound was denied because Megan's current BMI is too low.   I informed Megan of this.  She states she understands.  She will make some dietary changes and hope to get over the hump.  No further action required on this message.

## 2025-01-12 DIAGNOSIS — F90.1 ATTENTION DEFICIT HYPERACTIVITY DISORDER (ADHD), PREDOMINANTLY HYPERACTIVE TYPE: ICD-10-CM

## 2025-01-13 RX ORDER — DEXTROAMPHETAMINE SACCHARATE, AMPHETAMINE ASPARTATE, DEXTROAMPHETAMINE SULFATE AND AMPHETAMINE SULFATE 5; 5; 5; 5 MG/1; MG/1; MG/1; MG/1
20 TABLET ORAL
Qty: 60 TABLET | Refills: 0 | Status: SHIPPED | OUTPATIENT
Start: 2025-01-13

## 2025-01-14 ENCOUNTER — TELEPHONE (OUTPATIENT)
Age: 45
End: 2025-01-14

## 2025-01-14 NOTE — TELEPHONE ENCOUNTER
PA ADDERALL 20MG SUBMITTED     to [employer]     via    []CMM-KEY:    [x]Surescripts-Case ID # [id number]  []Availity-Auth ID #  NDC #    []Faxed to plan   []Other website    []Phone call Case ID #      []PA sent as URGENT    All office notes, labs and other pertaining documents and studies sent. Clinical questions answered. Awaiting determination from insurance company.     Turnaround time for your insurance to make a decision on your Prior Authorization can take 7-21 business days.

## 2025-01-14 NOTE — TELEPHONE ENCOUNTER
PA ADDERALL 20MG APPROVED         Patient advised by          []MyChart Message  []Phone call   [x]LMOM  []L/M to call office as no active Communication consent on file  []Unable to leave detailed message as VM not approved on Communication consent       Pharmacy advised by    [x]Fax  []Phone call

## 2025-01-16 DIAGNOSIS — E66.811 CLASS 1 OBESITY DUE TO EXCESS CALORIES WITH SERIOUS COMORBIDITY AND BODY MASS INDEX (BMI) OF 32.0 TO 32.9 IN ADULT: Primary | ICD-10-CM

## 2025-01-16 DIAGNOSIS — E78.2 ELEVATED TRIGLYCERIDES WITH HIGH CHOLESTEROL: ICD-10-CM

## 2025-01-16 DIAGNOSIS — E66.09 CLASS 1 OBESITY DUE TO EXCESS CALORIES WITH SERIOUS COMORBIDITY AND BODY MASS INDEX (BMI) OF 32.0 TO 32.9 IN ADULT: Primary | ICD-10-CM

## 2025-01-16 RX ORDER — SEMAGLUTIDE 2.4 MG/.75ML
INJECTION, SOLUTION SUBCUTANEOUS
Qty: 3 ML | Refills: 4 | Status: SHIPPED | OUTPATIENT
Start: 2025-01-16 | End: 2025-01-18 | Stop reason: SDUPTHER

## 2025-01-18 DIAGNOSIS — E66.09 CLASS 1 OBESITY DUE TO EXCESS CALORIES WITH SERIOUS COMORBIDITY AND BODY MASS INDEX (BMI) OF 32.0 TO 32.9 IN ADULT: ICD-10-CM

## 2025-01-18 DIAGNOSIS — E78.2 ELEVATED TRIGLYCERIDES WITH HIGH CHOLESTEROL: ICD-10-CM

## 2025-01-18 DIAGNOSIS — E66.811 CLASS 1 OBESITY DUE TO EXCESS CALORIES WITH SERIOUS COMORBIDITY AND BODY MASS INDEX (BMI) OF 32.0 TO 32.9 IN ADULT: ICD-10-CM

## 2025-01-20 ENCOUNTER — TELEPHONE (OUTPATIENT)
Age: 45
End: 2025-01-20

## 2025-01-20 RX ORDER — SEMAGLUTIDE 2.4 MG/.75ML
INJECTION, SOLUTION SUBCUTANEOUS
Qty: 3 ML | Refills: 0 | Status: SHIPPED | OUTPATIENT
Start: 2025-01-20

## 2025-01-20 NOTE — TELEPHONE ENCOUNTER
PA for [facility] SUBMITTED to WEGOVY 2.4MG    via      [x]travelfox-Case ID #   [x]PA sent as URGENT    All office notes, labs and other pertaining documents and studies sent. Clinical questions answered. Awaiting determination from insurance company.     Turnaround time for your insurance to make a decision on your Prior Authorization can take 7-21 business days.

## 2025-01-21 NOTE — TELEPHONE ENCOUNTER
PA for WEGOVY 2.4MG APPROVED     Date(s) approved January 20, 2025 to January 20, 2026         Patient advised by          [x][employer] Message  []Phone call   []LMOM  []L/M to call office as no active Communication consent on file  []Unable to leave detailed message as VM not approved on Communication consent       Pharmacy advised by    [x]Fax  []Phone call    Approval letter scanned into [employer] Yes

## 2025-01-24 DIAGNOSIS — F33.1 MODERATE EPISODE OF RECURRENT MAJOR DEPRESSIVE DISORDER (HCC): ICD-10-CM

## 2025-01-24 NOTE — TELEPHONE ENCOUNTER
Called patient but had to leave voicemail message requesting call back to schedule follow up visit for Dr Sivan Foy.  There  are available appointments next week for Dr Foy.

## 2025-01-27 RX ORDER — VORTIOXETINE 20 MG/1
20 TABLET, FILM COATED ORAL DAILY
Qty: 30 TABLET | Refills: 0 | Status: SHIPPED | OUTPATIENT
Start: 2025-01-27 | End: 2025-02-05 | Stop reason: SDUPTHER

## 2025-02-01 DIAGNOSIS — F39 MOOD DISORDER (HCC): ICD-10-CM

## 2025-02-03 RX ORDER — CARIPRAZINE 1.5 MG/1
CAPSULE, GELATIN COATED ORAL
Qty: 30 CAPSULE | Refills: 0 | OUTPATIENT
Start: 2025-02-03

## 2025-02-04 ENCOUNTER — TELEPHONE (OUTPATIENT)
Dept: PSYCHIATRY | Facility: CLINIC | Age: 45
End: 2025-02-04

## 2025-02-04 ENCOUNTER — RESULTS FOLLOW-UP (OUTPATIENT)
Dept: FAMILY MEDICINE CLINIC | Facility: CLINIC | Age: 45
End: 2025-02-04

## 2025-02-04 NOTE — TELEPHONE ENCOUNTER
Pt called back and provided insurance information. Writer update pt chart with correct insurance information.   Pt appreciative.

## 2025-02-05 ENCOUNTER — TELEMEDICINE (OUTPATIENT)
Dept: PSYCHIATRY | Facility: CLINIC | Age: 45
End: 2025-02-05
Payer: COMMERCIAL

## 2025-02-05 DIAGNOSIS — F90.1 ATTENTION DEFICIT HYPERACTIVITY DISORDER (ADHD), PREDOMINANTLY HYPERACTIVE TYPE: ICD-10-CM

## 2025-02-05 DIAGNOSIS — F33.1 MODERATE EPISODE OF RECURRENT MAJOR DEPRESSIVE DISORDER (HCC): ICD-10-CM

## 2025-02-05 DIAGNOSIS — F41.1 GENERALIZED ANXIETY DISORDER: ICD-10-CM

## 2025-02-05 DIAGNOSIS — F41.0 PANIC ATTACK: ICD-10-CM

## 2025-02-05 DIAGNOSIS — F39 MOOD DISORDER (HCC): Primary | ICD-10-CM

## 2025-02-05 PROCEDURE — 99213 OFFICE O/P EST LOW 20 MIN: CPT | Performed by: NURSE PRACTITIONER

## 2025-02-06 ENCOUNTER — TELEPHONE (OUTPATIENT)
Dept: PSYCHIATRY | Facility: CLINIC | Age: 45
End: 2025-02-06

## 2025-02-09 DIAGNOSIS — E66.09 CLASS 1 OBESITY DUE TO EXCESS CALORIES WITH SERIOUS COMORBIDITY AND BODY MASS INDEX (BMI) OF 32.0 TO 32.9 IN ADULT: ICD-10-CM

## 2025-02-09 DIAGNOSIS — E78.2 ELEVATED TRIGLYCERIDES WITH HIGH CHOLESTEROL: ICD-10-CM

## 2025-02-09 DIAGNOSIS — E66.811 CLASS 1 OBESITY DUE TO EXCESS CALORIES WITH SERIOUS COMORBIDITY AND BODY MASS INDEX (BMI) OF 32.0 TO 32.9 IN ADULT: ICD-10-CM

## 2025-02-11 RX ORDER — SEMAGLUTIDE 2.4 MG/.75ML
INJECTION, SOLUTION SUBCUTANEOUS
Qty: 3 ML | Refills: 0 | Status: SHIPPED | OUTPATIENT
Start: 2025-02-11

## 2025-02-14 DIAGNOSIS — F90.1 ATTENTION DEFICIT HYPERACTIVITY DISORDER (ADHD), PREDOMINANTLY HYPERACTIVE TYPE: ICD-10-CM

## 2025-02-16 VITALS — HEIGHT: 66 IN | WEIGHT: 164 LBS | BODY MASS INDEX: 26.36 KG/M2

## 2025-02-16 PROBLEM — F33.1 MODERATE EPISODE OF RECURRENT MAJOR DEPRESSIVE DISORDER (HCC): Status: ACTIVE | Noted: 2025-02-16

## 2025-02-16 PROBLEM — F39 MOOD DISORDER (HCC): Status: ACTIVE | Noted: 2025-02-16

## 2025-02-16 RX ORDER — DEXTROAMPHETAMINE SACCHARATE, AMPHETAMINE ASPARTATE, DEXTROAMPHETAMINE SULFATE AND AMPHETAMINE SULFATE 5; 5; 5; 5 MG/1; MG/1; MG/1; MG/1
20 TABLET ORAL
Qty: 60 TABLET | Refills: 0 | Status: SHIPPED | OUTPATIENT
Start: 2025-02-16

## 2025-02-16 NOTE — ASSESSMENT & PLAN NOTE
Adderall 20 mg twice daily    Megan reports medication helps with focus and concentration.  Denies side effects

## 2025-02-16 NOTE — BH TREATMENT PLAN
TREATMENT PLAN (Medication Management Only)         [facility]     Name and Date of Birth:  Megan Jaime 89 y.o. 1980  Date of Treatment Plan: March 26, 2024, February 5, 2025  Diagnosis/Diagnoses:    1. Panic attack    2. Generalized anxiety disorder    3. Attention deficit hyperactivity disorder (ADHD), predominantly hyperactive type    4. Anxiety    5. High risk medication use    6. Mood disorder (HCC)       Strengths/Personal Resources for Self-Care: supportive family, taking medications as prescribed, ability to communicate needs.  Area/Areas of need (in own words): mood instability, ADHD symptoms  1.Long Term Goal: improve mood stability.  Target Date:6 months - 8/5/2025  Person/Persons responsible for completion of goal: ivonne Guzman, family  2.         Short Term Objective (s) - How will we reach this goal?:   A. Provider new recommended medication/dosage changes and/or continue medication(s): continue current medications as prescribed.  B.  Therapy .  C.  Structure .  Target Date:6 months - 8/5/2025  Person/Persons Responsible for Completion of Goal: ivonne Guzman, family  Progress Towards Goals: continuing treatment  Treatment Modality: medication management every 3 months  Review due 180 days from date of this plan: 6 months - 8/5/2025  Expected length of service: maintenance  My Physician/PA/NP and I have developed this plan together and I agree to work on the goals and objectives. I understand the treatment goals that were developed for my treatment.

## 2025-02-16 NOTE — ASSESSMENT & PLAN NOTE
Trintellix 20 mg tablet daily, Vraylar 1.5 mg capsules daily    Megan reports moods are stable denies depression or suicidal ideation.  Aims is negative.  Reports appetite and sleep patterns are good.  Denies side effects.

## 2025-02-16 NOTE — ASSESSMENT & PLAN NOTE
Ativan 0.5 mg tablet daily as needed    Megan denies panic attacks medication is effective to control anxiety when elevated.  BHAVIN-7 score of 0 indicates no anxiety.  Takes medication as prescribed and family are supportive

## 2025-02-16 NOTE — PSYCH
Virtual Regular Visit    Verification of patient location:    Patient is located at Home in the following state in which I hold an active license [place]      Assessment/Plan:    Problem List Items Addressed This Visit          Behavioral Health    Panic attack    Mood disorder (HCC) - Primary    Ativan 0.5 mg tablet daily as needed    Megan denies panic attacks medication is effective to control anxiety when elevated.  BHAVIN-7 score of 0 indicates no anxiety.  Takes medication as prescribed and family are supportive         Relevant Medications    cariprazine (Vraylar) 1.5 MG capsule    Vortioxetine HBr (Trintellix) 20 MG tablet    Moderate episode of recurrent major depressive disorder (HCC)    Trintellix 20 mg tablet daily, Vraylar 1.5 mg capsules daily    Megan reports moods are stable denies depression or suicidal ideation.  Aims is negative.  Reports appetite and sleep patterns are good.  Denies side effects.         Relevant Medications    cariprazine (Vraylar) 1.5 MG capsule    Vortioxetine HBr (Trintellix) 20 MG tablet    Generalized anxiety disorder    Anxiety is under control with current medications.         Relevant Medications    cariprazine (Vraylar) 1.5 MG capsule    Vortioxetine HBr (Trintellix) 20 MG tablet    Attention deficit hyperactivity disorder (ADHD), predominantly hyperactive type    Adderall 20 mg twice daily    Megan reports medication helps with focus and concentration.  Denies side effects         Relevant Medications    cariprazine (Vraylar) 1.5 MG capsule    Vortioxetine HBr (Trintellix) 20 MG tablet       Goals addressed in session: Goal 1     Depression Follow-up Plan Completed: Not applicable    Reason for visit is   Chief Complaint   Patient presents with    ADHD    Anxiety    Depression    Medication Management    Virtual Regular Visit          Encounter provider Sivan Foy, PhD      Recent Visits  No visits were found meeting these conditions.  Showing recent visits within  past 7 days and meeting all other requirements  Future Appointments  No visits were found meeting these conditions.  Showing future appointments within next 150 days and meeting all other requirements       The patient was identified by name and date of birth. Megan Jaime was informed that this is a telemedicine visit and that the visit is being conducted throughthe Epic Embedded platform. She agrees to proceed..  My office door was closed. No one else was in the room.  She acknowledged consent and understanding of privacy and security of the video platform. The patient has agreed to participate and understands they can discontinue the visit at any time.    Patient is aware this is a billable service.     Subjective  Megan Jaime is a 44 y.o. female history of panic attack ADHD anxiety depression mood swings seen for medication management and mood assessment.      HPI     Past Medical History:   Diagnosis Date    Anxiety 2020    Attention deficit hyperactivity disorder (ADHD), predominantly hyperactive type 3/10/2022    Lumbar degenerative disc disease 3/10/2022    Mood disorder (HCC) 2025    Patient denies medical problems     Seasonal allergic rhinitis 2022    Stress headaches 2020       Past Surgical History:   Procedure Laterality Date     SECTION         Current Outpatient Medications   Medication Sig Dispense Refill    cariprazine (Vraylar) 1.5 MG capsule Take 1 capsule (1.5 mg total) by mouth daily 30 capsule 3    Vortioxetine HBr (Trintellix) 20 MG tablet Take 1 tablet (20 mg total) by mouth daily 30 tablet 3    amphetamine-dextroamphetamine (ADDERALL, 20MG,) 20 mg tablet Take 1 tablet (20 mg total) by mouth 2 (two) times a day Max Daily Amount: 40 mg 60 tablet 0    fenofibrate (TRICOR) 145 mg tablet Take 1 tablet (145 mg total) by mouth daily 100 tablet 1    fluticasone (FLONASE) 50 mcg/act nasal spray 1 spray into each nostril daily      Junel 1.5/30 1.5-30 MG-MCG TABS  "Take 1 tablet by mouth in the morning      LORazepam (ATIVAN) 0.5 mg tablet Take 0.5 mg by mouth daily as needed      multivitamin (THERAGRAN) TABS Take 1 tablet by mouth daily      Semaglutide-Weight Management (Wegovy) 2.4 MG/0.75ML Inject 2.4 mg under the skin weekly 3 mL 0     No current facility-administered medications for this visit.        No Known Allergies    Review of Systems  Review Of Systems:     Mood Normal   Behavior Normal    Thought Content Normal   General Emotional Problems   Personality Normal   Other Psych Symptoms Normal   Constitutional As Noted in HPI   ENT As Noted in HPI   Cardiovascular As Noted in HPI   Respiratory As Noted in HPI   Gastrointestinal As Noted in HPI   Genitourinary As Noted in HPI   Musculoskeletal As Noted in HPI   Integumentary As Noted in HPI   Neurological As Noted in HPI   Endocrine Normal    Other Symptoms Normal        Video Exam    Vitals:    02/05/25 0948   Weight: 74.4 kg (164 lb)   Height: 5' 5.5\" (1.664 m)     Mental Status Examination:     Appearance calm and cooperative , adequate hygiene and grooming, and good eye contact    Mood anxious   Affect affect appropriate    Speech a normal rate   Thought Processes normal thought processes   Hallucinations no hallucinations present    Thought Content no delusions   Abnormal Thoughts no suicidal thoughts  and no homicidal thoughts    Orientation  oriented to person and place and time   Remote Memory short term memory intact and long term memory intact   Attention Span concentration impaired medication helps   Intellect Appears to be of Average Intelligence   Insight Insight intact   Judgement judgment was intact   Muscle Strength Muscle strength and tone were normal   Language no difficulty naming common objects   Fund of Knowledge displays adequate knowledge of current events   Pain none   Pain Scale 0         Laboratory Results: No results found for this or any previous visit.     Assessment/Plan:    " "  Assessment  Diagnoses and all orders for this visit:     Moderate episode of recurrent major depressive disorder (HCC)  -     Vortioxetine HBr (Trintellix) 20 MG tablet; Take 1 tablet (20 mg total) by mouth daily     Panic attack     Generalized anxiety disorder     Attention deficit hyperactivity disorder (ADHD), predominantly hyperactive type      Treatment Recommendations- Risks Benefits       Immediate Medical/Psychiatric/Psychotherapy Treatments and Any Precautions: Continue treatment plan increase Trintellix to 20 mg     Risks, Benefits And Possible Side Effects Of Medications:  {PSYCH RISK, BENEFITS AND POSSIBLE SIDE EFFECTS (Optional):52131     Controlled Medication Discussion: She is aware of safe use and storage of medication     Psychotherapy Provided: 30 minutes  Supportive therapy  Medication evaluation  Mood assessment   medication education  Normalized and validated her feelings  Safety plan not compiled; however, she is aware of who to call in crisis, 988 and ED if necessary     Goals discussed in session: Reduce depression and anxiety      Treatment Plan:     Completed and signed during the session: updated     \"Portions of the record may have been created with voice recognition software. Occasional wrong word or \"sound a like\" substitutions may have occurred due to the inherent limitations of voice recognition software. Read the chart carefully and recognize, using context, where substitutions have occurred. Please call if you have any questions.       Visit Time    Visit Start Time: 9:45  Visit Stop Time: 10:10  Total Visit Duration:  20 minutes        "

## 2025-03-11 DIAGNOSIS — E78.2 ELEVATED TRIGLYCERIDES WITH HIGH CHOLESTEROL: ICD-10-CM

## 2025-03-11 DIAGNOSIS — E66.811 CLASS 1 OBESITY DUE TO EXCESS CALORIES WITH SERIOUS COMORBIDITY AND BODY MASS INDEX (BMI) OF 32.0 TO 32.9 IN ADULT: ICD-10-CM

## 2025-03-11 DIAGNOSIS — E66.09 CLASS 1 OBESITY DUE TO EXCESS CALORIES WITH SERIOUS COMORBIDITY AND BODY MASS INDEX (BMI) OF 32.0 TO 32.9 IN ADULT: ICD-10-CM

## 2025-03-12 ENCOUNTER — TELEPHONE (OUTPATIENT)
Age: 45
End: 2025-03-12

## 2025-03-12 RX ORDER — SEMAGLUTIDE 2.4 MG/.75ML
2.4 INJECTION, SOLUTION SUBCUTANEOUS WEEKLY
Qty: 4 ML | Refills: 0 | Status: SHIPPED | OUTPATIENT
Start: 2025-03-12

## 2025-03-12 NOTE — TELEPHONE ENCOUNTER
Pharmacy called ,needing a quantity clarification on Wegovy, should be either 3ML or 1 box, not 4ML

## 2025-03-12 NOTE — TELEPHONE ENCOUNTER
Requested medication(s) are due for refill today: Yes  Patient has already received a courtesy refill: No  Other reason request has been forwarded to provider: Weight loss review

## 2025-03-13 ENCOUNTER — TELEMEDICINE (OUTPATIENT)
Dept: FAMILY MEDICINE CLINIC | Facility: CLINIC | Age: 45
End: 2025-03-13
Payer: COMMERCIAL

## 2025-03-13 DIAGNOSIS — E66.09 CLASS 1 OBESITY DUE TO EXCESS CALORIES WITH SERIOUS COMORBIDITY AND BODY MASS INDEX (BMI) OF 32.0 TO 32.9 IN ADULT: Primary | ICD-10-CM

## 2025-03-13 DIAGNOSIS — E66.811 CLASS 1 OBESITY DUE TO EXCESS CALORIES WITH SERIOUS COMORBIDITY AND BODY MASS INDEX (BMI) OF 32.0 TO 32.9 IN ADULT: Primary | ICD-10-CM

## 2025-03-13 DIAGNOSIS — F90.1 ATTENTION DEFICIT HYPERACTIVITY DISORDER (ADHD), PREDOMINANTLY HYPERACTIVE TYPE: ICD-10-CM

## 2025-03-13 PROCEDURE — 99213 OFFICE O/P EST LOW 20 MIN: CPT | Performed by: STUDENT IN AN ORGANIZED HEALTH CARE EDUCATION/TRAINING PROGRAM

## 2025-03-13 NOTE — PROGRESS NOTES
Virtual Regular VisitName: Megan Jaime      : 1980      MRN: [id number]  Encounter Provider: Mili Smith MD  Encounter Date: 3/13/2025   Encounter department: [facility]  :  Assessment & Plan  Class 1 obesity due to excess calories with serious comorbidity and body mass index (BMI) of 32.0 to 32.9 in adult      -Patient 161 down from 164 lbs  -Continue Wegovy 2.4 mg weekly  -Denies side effects and concerns with medication         Attention deficit hyperactivity disorder (ADHD), predominantly hyperactive type    -Stable, will refill medication when due           History of Present Illness     HPI    Patient presents for weight check.  She is currently 161 pounds down from 164 pounds.  She notes that she is doing well on medication.  She notes that she has modified her diet.  She is eating more healthy foods and avoiding sugary cereals.          Review of Systems   Constitutional:  Negative for activity change, chills, diaphoresis, fatigue and fever.   HENT:  Negative for congestion, postnasal drip, rhinorrhea and sore throat.    Respiratory:  Negative for cough, shortness of breath and wheezing.    Cardiovascular:  Negative for chest pain, palpitations and leg swelling.   Gastrointestinal:  Negative for abdominal pain, constipation, diarrhea, nausea and vomiting.   Musculoskeletal:  Negative for myalgias.   Skin:  Negative for rash.   Neurological:  Negative for weakness, light-headedness and headaches.   Psychiatric/Behavioral:  The patient is not nervous/anxious.        Objective   There were no vitals taken for this visit.    Physical Exam  Constitutional:       Appearance: Normal appearance.   HENT:      Head: Normocephalic and atraumatic.      Nose: Nose normal.   Eyes:      Extraocular Movements: Extraocular movements intact.      Conjunctiva/sclera: Conjunctivae normal.      Pupils: Pupils are equal, round, and reactive to light.   Neurological:      General: No focal  deficit present.      Mental Status: She is alert and oriented to person, place, and time.   Psychiatric:         Mood and Affect: Mood normal.         Behavior: Behavior normal.         Thought Content: Thought content normal.         Judgment: Judgment normal.         Administrative Statements   Encounter provider Mili Smith MD    The Patient is located at Home and in the following state in which I hold an active license [place].    The patient was identified by name and date of birth. Megan Jaime was informed that this is a telemedicine visit and that the visit is being conducted through the Epic Embedded platform. She agrees to proceed..  My office door was closed. No one else was in the room.  She acknowledged consent and understanding of privacy and security of the video platform. The patient has agreed to participate and understands they can discontinue the visit at any time.    I have spent a total time of 20 minutes in caring for this patient on the day of the visit/encounter including Impressions, Documenting in the medical record, and Obtaining or reviewing history  , not including the time spent for establishing the audio/video connection.

## 2025-03-13 NOTE — ASSESSMENT & PLAN NOTE
-Patient 161 down from 164 lbs  -Continue Wegovy 2.4 mg weekly  -Denies side effects and concerns with medication

## 2025-03-18 DIAGNOSIS — F90.1 ATTENTION DEFICIT HYPERACTIVITY DISORDER (ADHD), PREDOMINANTLY HYPERACTIVE TYPE: ICD-10-CM

## 2025-03-18 RX ORDER — DEXTROAMPHETAMINE SACCHARATE, AMPHETAMINE ASPARTATE, DEXTROAMPHETAMINE SULFATE AND AMPHETAMINE SULFATE 5; 5; 5; 5 MG/1; MG/1; MG/1; MG/1
20 TABLET ORAL
Qty: 60 TABLET | Refills: 0 | Status: SHIPPED | OUTPATIENT
Start: 2025-03-18

## 2025-04-05 DIAGNOSIS — E66.811 CLASS 1 OBESITY DUE TO EXCESS CALORIES WITH SERIOUS COMORBIDITY AND BODY MASS INDEX (BMI) OF 32.0 TO 32.9 IN ADULT: ICD-10-CM

## 2025-04-05 DIAGNOSIS — E66.09 CLASS 1 OBESITY DUE TO EXCESS CALORIES WITH SERIOUS COMORBIDITY AND BODY MASS INDEX (BMI) OF 32.0 TO 32.9 IN ADULT: ICD-10-CM

## 2025-04-05 DIAGNOSIS — E78.2 ELEVATED TRIGLYCERIDES WITH HIGH CHOLESTEROL: ICD-10-CM

## 2025-04-07 ENCOUNTER — TELEPHONE (OUTPATIENT)
Age: 45
End: 2025-04-07

## 2025-04-07 DIAGNOSIS — E78.2 ELEVATED TRIGLYCERIDES WITH HIGH CHOLESTEROL: ICD-10-CM

## 2025-04-07 DIAGNOSIS — E66.811 CLASS 1 OBESITY DUE TO EXCESS CALORIES WITH SERIOUS COMORBIDITY AND BODY MASS INDEX (BMI) OF 32.0 TO 32.9 IN ADULT: ICD-10-CM

## 2025-04-07 DIAGNOSIS — E66.09 CLASS 1 OBESITY DUE TO EXCESS CALORIES WITH SERIOUS COMORBIDITY AND BODY MASS INDEX (BMI) OF 32.0 TO 32.9 IN ADULT: ICD-10-CM

## 2025-04-07 RX ORDER — SEMAGLUTIDE 2.4 MG/.75ML
2.4 INJECTION, SOLUTION SUBCUTANEOUS WEEKLY
Qty: 4 ML | Refills: 0 | Status: SHIPPED | OUTPATIENT
Start: 2025-04-07

## 2025-04-07 NOTE — TELEPHONE ENCOUNTER
Requested medication(s) are due for refill today: Yes  Patient has already received a courtesy refill: No  Other reason request has been forwarded to provider: Rx refill specialists are unable to approve weight management medications. Please review if medication is appropriate to refill.

## 2025-04-07 NOTE — TELEPHONE ENCOUNTER
[facility] needs an order clarification on the Wegovy.    Dispense 4 mL?  One box is 3 mL.  How many boxes do you want given?     Please advise.

## 2025-04-08 RX ORDER — SEMAGLUTIDE 2.4 MG/.75ML
2.4 INJECTION, SOLUTION SUBCUTANEOUS WEEKLY
Qty: 4 ML | Refills: 0 | OUTPATIENT
Start: 2025-04-08

## 2025-04-10 DIAGNOSIS — E78.1 HYPERTRIGLYCERIDEMIA: ICD-10-CM

## 2025-04-10 RX ORDER — FENOFIBRATE 145 MG/1
145 TABLET, COATED ORAL DAILY
Qty: 100 TABLET | Refills: 1 | Status: SHIPPED | OUTPATIENT
Start: 2025-04-10

## 2025-04-19 DIAGNOSIS — F90.1 ATTENTION DEFICIT HYPERACTIVITY DISORDER (ADHD), PREDOMINANTLY HYPERACTIVE TYPE: ICD-10-CM

## 2025-04-21 RX ORDER — DEXTROAMPHETAMINE SACCHARATE, AMPHETAMINE ASPARTATE, DEXTROAMPHETAMINE SULFATE AND AMPHETAMINE SULFATE 5; 5; 5; 5 MG/1; MG/1; MG/1; MG/1
20 TABLET ORAL
Qty: 60 TABLET | Refills: 0 | Status: SHIPPED | OUTPATIENT
Start: 2025-04-21

## 2025-05-01 ENCOUNTER — TELEPHONE (OUTPATIENT)
Age: 45
End: 2025-05-01

## 2025-05-05 ENCOUNTER — TELEPHONE (OUTPATIENT)
Dept: PSYCHIATRY | Facility: CLINIC | Age: 45
End: 2025-05-05

## 2025-05-07 ENCOUNTER — TELEMEDICINE (OUTPATIENT)
Dept: PSYCHIATRY | Facility: CLINIC | Age: 45
End: 2025-05-07
Payer: COMMERCIAL

## 2025-05-07 DIAGNOSIS — F39 MOOD DISORDER (HCC): ICD-10-CM

## 2025-05-07 DIAGNOSIS — F41.1 GENERALIZED ANXIETY DISORDER: ICD-10-CM

## 2025-05-07 DIAGNOSIS — F33.1 MODERATE EPISODE OF RECURRENT MAJOR DEPRESSIVE DISORDER (HCC): ICD-10-CM

## 2025-05-07 DIAGNOSIS — F41.0 PANIC ATTACK: Primary | ICD-10-CM

## 2025-05-07 DIAGNOSIS — F90.1 ATTENTION DEFICIT HYPERACTIVITY DISORDER (ADHD), PREDOMINANTLY HYPERACTIVE TYPE: ICD-10-CM

## 2025-05-07 PROCEDURE — 90833 PSYTX W PT W E/M 30 MIN: CPT | Performed by: NURSE PRACTITIONER

## 2025-05-07 PROCEDURE — 99214 OFFICE O/P EST MOD 30 MIN: CPT | Performed by: NURSE PRACTITIONER

## 2025-05-07 RX ORDER — LORAZEPAM 0.5 MG/1
0.5 TABLET ORAL DAILY PRN
Qty: 30 TABLET | Refills: 0 | Status: SHIPPED | OUTPATIENT
Start: 2025-05-07

## 2025-05-14 DIAGNOSIS — E66.09 CLASS 1 OBESITY DUE TO EXCESS CALORIES WITH SERIOUS COMORBIDITY AND BODY MASS INDEX (BMI) OF 32.0 TO 32.9 IN ADULT: ICD-10-CM

## 2025-05-14 DIAGNOSIS — E66.811 CLASS 1 OBESITY DUE TO EXCESS CALORIES WITH SERIOUS COMORBIDITY AND BODY MASS INDEX (BMI) OF 32.0 TO 32.9 IN ADULT: ICD-10-CM

## 2025-05-14 DIAGNOSIS — E78.2 ELEVATED TRIGLYCERIDES WITH HIGH CHOLESTEROL: ICD-10-CM

## 2025-05-14 RX ORDER — SEMAGLUTIDE 2.4 MG/.75ML
2.4 INJECTION, SOLUTION SUBCUTANEOUS WEEKLY
Qty: 4 ML | Refills: 0 | Status: SHIPPED | OUTPATIENT
Start: 2025-05-14

## 2025-05-14 NOTE — TELEPHONE ENCOUNTER
Patient needs her refill of wegovy.. will be following up with Dr. Marroquin soon.  Annita Almanza

## 2025-05-14 NOTE — TELEPHONE ENCOUNTER
Requested medication(s) are due for refill today: Yes  Patient has already received a courtesy refill: No  Other reason request has been forwarded to provider: Refill must be reviewed and completed by the office or provider. The refill is unable to be approved or denied by the medication management team.

## 2025-05-15 NOTE — TELEPHONE ENCOUNTER
Requested medication(s) are due for refill today: No  Patient has already received a courtesy refill: No  Other reason request has been forwarded to provider: DUPLICATE - PLEASE REFUSE

## 2025-05-16 RX ORDER — SEMAGLUTIDE 2.4 MG/.75ML
2.4 INJECTION, SOLUTION SUBCUTANEOUS WEEKLY
Qty: 4 ML | Refills: 0 | Status: SHIPPED | OUTPATIENT
Start: 2025-05-16

## 2025-05-22 DIAGNOSIS — F90.1 ATTENTION DEFICIT HYPERACTIVITY DISORDER (ADHD), PREDOMINANTLY HYPERACTIVE TYPE: ICD-10-CM

## 2025-05-22 RX ORDER — DEXTROAMPHETAMINE SACCHARATE, AMPHETAMINE ASPARTATE, DEXTROAMPHETAMINE SULFATE AND AMPHETAMINE SULFATE 5; 5; 5; 5 MG/1; MG/1; MG/1; MG/1
20 TABLET ORAL
Qty: 60 TABLET | Refills: 0 | Status: SHIPPED | OUTPATIENT
Start: 2025-05-22

## 2025-05-27 ENCOUNTER — TELEMEDICINE (OUTPATIENT)
Dept: FAMILY MEDICINE CLINIC | Facility: CLINIC | Age: 45
End: 2025-05-27
Payer: COMMERCIAL

## 2025-05-27 VITALS — HEIGHT: 66 IN | BODY MASS INDEX: 25.39 KG/M2 | WEIGHT: 158 LBS

## 2025-05-27 DIAGNOSIS — E66.3 OVERWEIGHT (BMI 25.0-29.9): Primary | ICD-10-CM

## 2025-05-27 PROCEDURE — 99213 OFFICE O/P EST LOW 20 MIN: CPT | Performed by: STUDENT IN AN ORGANIZED HEALTH CARE EDUCATION/TRAINING PROGRAM

## 2025-05-27 NOTE — PROGRESS NOTES
Virtual Regular Visit  Name: Megan Jaime      : 1980      MRN: [id number]  Encounter Provider: Mili Smith MD  Encounter Date: 2025   Encounter department: [facility]  :  Assessment & Plan  Overweight (BMI 25.0-29.9)  Prior Authorization Clinical Questions for Weight Management Pharmacotherapy    2. Does the patient have a diagnosis of obesity, confirmed by a BMI greater than or equal to 30 kg/m^2?: No  3. Does the patient have a BMI of greater than or equal to 27 kg/m^2 with at least one weight-related comorbidity/risk factor/complication (e.g. diabetes, dyslipidemia, coronary artery disease)?: No  9. Does the patient have a history of type 2 diabetes?: No  For renewals: Has the patient had a positive outcome with current weight management medication (i.e., change in body weight of at least 4-5% after 12-16 weeks on maximally tolerated dose)?: Yes     Baseline weight (in pounds): 198 lbs  Current weight (in pounds): 158 lbs  Weight loss percentage: -20.2%         -Discussed tapering off Wegovy but will consider when current box of 2.4 mg is completed           History of Present Illness     HPI    Visit to discuss possibly tapering off of Wegovy.  She notes that she is almost at her desired weight and is considering coming off of medication due to concerns with insurance.  She just got a new box of 2.4 and will consider tapering down once she finishes the current box.  She has put forth lifestyle modifications and feels she may be able to keep the weight off.  She does note some  hair loss.    Review of Systems   Constitutional:  Negative for activity change, chills, diaphoresis, fatigue and fever.   HENT:  Negative for congestion, postnasal drip, rhinorrhea and sore throat.    Respiratory:  Negative for cough, shortness of breath and wheezing.    Cardiovascular:  Negative for chest pain, palpitations and leg swelling.   Gastrointestinal:  Negative for abdominal pain,  "constipation, diarrhea, nausea and vomiting.   Musculoskeletal:  Negative for myalgias.   Skin:  Negative for rash.   Neurological:  Negative for weakness, light-headedness and headaches.   Psychiatric/Behavioral:  The patient is not nervous/anxious.        Objective   Ht 5' 5.5\" (1.664 m)   Wt 71.7 kg (158 lb)   LMP 05/14/2025 (Exact Date)   BMI 25.89 kg/m²     Physical Exam  Constitutional:       Appearance: Normal appearance.   HENT:      Head: Normocephalic and atraumatic.     Neurological:      General: No focal deficit present.      Mental Status: She is alert and oriented to person, place, and time.     Psychiatric:         Mood and Affect: Mood normal.         Behavior: Behavior normal.         Thought Content: Thought content normal.         Judgment: Judgment normal.         Administrative Statements   Encounter provider Mili Smith MD    The Patient is located at Home and in the following state in which I hold an active license [place].    The patient was identified by name and date of birth. Megan Jaime was informed that this is a telemedicine visit and that the visit is being conducted through the Epic Embedded platform. She agrees to proceed..  My office door was closed. No one else was in the room.  She acknowledged consent and understanding of privacy and security of the video platform. The patient has agreed to participate and understands they can discontinue the visit at any time.    I have spent a total time of 25 minutes in caring for this patient on the day of the visit/encounter including Counseling / Coordination of care, Documenting in the medical record, and Reviewing/placing orders in the medical record (including tests, medications, and/or procedures), not including the time spent for establishing the audio/video connection.    "

## 2025-06-01 NOTE — ASSESSMENT & PLAN NOTE
Trintellix 20 mg  Medication is reported to be effective denies depression or suicidal ideation she is functioning well.  Appetite and sleep patterns are good and denies side effects of medication

## 2025-06-01 NOTE — ASSESSMENT & PLAN NOTE
"Vraylar 1.5 mg  Megan reports feeling \"great\" denies problems or concerns with medication and aims negative  Denies anxiety or depression.  No suicidal ideation.         "

## 2025-06-01 NOTE — ASSESSMENT & PLAN NOTE
Vraylar 1.5 mg, Trintellix 20 mg  Medication is effective to maintain a stable mood and reduce anxiety she is coping well.

## 2025-06-01 NOTE — ASSESSMENT & PLAN NOTE
Adderall 20 mg 2 times a day as needed  Megan reports medication is effective in helping her focus and concentrate.  She is functioning well at work

## 2025-06-01 NOTE — ASSESSMENT & PLAN NOTE
Ativan 0.5 mg daily as needed for anxiety  Megan reports medication is effective when she has mild anxiety, rarely uses medication.  Denies side effects and takes medication as prescribed    Orders:    LORazepam (ATIVAN) 0.5 mg tablet; Take 1 tablet (0.5 mg total) by mouth daily as needed for anxiety

## 2025-06-01 NOTE — PSYCH
"Virtual Regular Visit    Patient is located at Home in the following state in which I hold an active license [place].    Assessment & Plan  Panic attack  Ativan 0.5 mg daily as needed for anxiety  Megan reports medication is effective when she has mild anxiety, rarely uses medication.  Denies side effects and takes medication as prescribed    Orders:    LORazepam (ATIVAN) 0.5 mg tablet; Take 1 tablet (0.5 mg total) by mouth daily as needed for anxiety    Mood disorder (HCC)  Vraylar 1.5 mg  Megan reports feeling \"great\" denies problems or concerns with medication and aims negative  Denies anxiety or depression.  No suicidal ideation.         Moderate episode of recurrent major depressive disorder (HCC)  Trintellix 20 mg  Medication is reported to be effective denies depression or suicidal ideation she is functioning well.  Appetite and sleep patterns are good and denies side effects of medication         Generalized anxiety disorder  Vraylar 1.5 mg, Trintellix 20 mg  Medication is effective to maintain a stable mood and reduce anxiety she is coping well.         Attention deficit hyperactivity disorder (ADHD), predominantly hyperactive type  Adderall 20 mg 2 times a day as needed  Megan reports medication is effective in helping her focus and concentrate.  She is functioning well at work                   Reason for visit is   Chief Complaint   Patient presents with    Mood Swings    Anxiety    Depression    Medication Management        Visit Time  Visit Start Time: 215  Visit Stop Time: 245  Total Visit Duration: 30 minutes    Encounter provider: Sivan Foy, PhD  Provider located at [facility] [place] [facility] [address]  [facility] [id number] [phone]    Recent Visits  Date Type Provider Dept   05/27/25 Telemedicine Mili Smith MD [facility]   Showing recent visits within past 7 days and meeting all other " requirements  Future Appointments  No visits were found meeting these conditions.  Showing future appointments within next 150 days and meeting all other requirements       The patient was identified by name and date of birth. Megan Jaime was informed that this is a telemedicine visit and that the visit is being conducted through the Epic Embedded platform. She agrees to proceed. My office door was closed. No one else was in the room. She acknowledged consent and understanding of privacy and security of the video platform. The patient has agreed to participate and understands they can discontinue the visit at any time. Patient is aware this is a billable service.     MEDICATION MANAGEMENT NOTE        [facility]      Name and Date of Birth:  Megan Jaime 45 y.o. 1980 MRN: [id number]    Date of Visit: June 1, 2025         - Educated about healthy life style, risk of falls/sedation and addiction. Patient was receptive to education.  - Medications sent to the patient's pharmacy for 90 day supply    - RTC in 12 weeks  - The patient was educated about 24 hour and weekend coverage for urgent situations accessed by calling [facility] main practice number  - Patient was educated to call [employer] Suicide Prevention Lifeline (6-920-174-JAVS [4924]) for behavioral crisis at anytime or 911 for any safety concerns, or go to nearest ER if the symptoms become overwhelming or unmanageable.         Subjective        Megan Jaime is a 45 y.o. female, visited for Mood Swings, Anxiety, Depression, and Medication Management, who was virtually seen and evaluated today at the [facility] outpatient clinic for follow-up and medication management. Completes psychiatric assessment without difficulty.     At previous outpatient psychiatric appointment with this writer, Megan denies panic attacks medication is effective to control  anxiety when elevated. BHAVIN-7 score of 0 indicates no anxiety. Takes medication as prescribed and family are supportive .   She denies any current adverse medication side effects.      Megan currently at goal mood to stabilize denies anxiety or depression.  BHAVIN-7 score of 2 indicates minimal or no anxiety; PHQ 9 score of 0 no depression or suicidal ideation.  She reports functioning well denies side effects of medication aims negative.  She reports appetite and sleep are good.  Takes medication as prescribed and family are supportive.  Plan next visit order labs.  Discussed coping mechanisms                Review Of Systems:  Pertinent items are noted in HPI; all others are negative; no recent changes in medications or health status reported.    PHQ-2/9 Depression Screening    Little interest or pleasure in doing things: 0 - not at all  Feeling down, depressed, or hopeless: 0 - not at all  Trouble falling or staying asleep, or sleeping too much: 0 - not at all  Feeling tired or having little energy: 0 - not at all  Poor appetite or overeatin - not at all  Feeling bad about yourself - or that you are a failure or have let yourself or your family down: 0 - not at all  Trouble concentrating on things, such as reading the newspaper or watching television: 0 - not at all  Moving or speaking so slowly that other people could have noticed. Or the opposite - being so fidgety or restless that you have been moving around a lot more than usual: 0 - not at all  Thoughts that you would be better off dead, or of hurting yourself in some way: 0 - not at all  PHQ-9 Score: 0  PHQ-9 Interpretation: No or Minimal depression         BHAVIN-7 Flowsheet Screening      Flowsheet Row Most Recent Value   Over the last two weeks, how often have you been bothered by the following problems?     Feeling nervous, anxious, or on edge 0    Not being able to stop or control worrying 0    Worrying too much about different things 0    Trouble  relaxing  0    Being so restless that it's hard to sit still 0    Becoming easily annoyed or irritable  1    Feeling afraid as if something awful might happen 1    How difficult have these problems made it for you to do your work, take care of things at home, or get along with other people?  Not difficult at all    BHAVIN Score  2             Historical Information        Past Medical History:    Past Medical History[1]     Past Surgical History[2]  Allergies[3]    Substance Abuse History:    Social History     Substance and Sexual Activity   Alcohol Use Yes    Comment: social     Social History     Substance and Sexual Activity   Drug Use No       Social History:    Social History     Socioeconomic History    Marital status: /Civil Union     Spouse name: Not on file    Number of children: Not on file    Years of education: Not on file    Highest education level: Not on file   Occupational History    Not on file   Tobacco Use    Smoking status: Former     Current packs/day: 0.00     Average packs/day: 1 pack/day for 14.0 years (14.0 ttl pk-yrs)     Types: Cigarettes     Start date:      Quit date:      Years since quittin.4     Passive exposure: Never    Smokeless tobacco: Never   Vaping Use    Vaping status: Never Used   Substance and Sexual Activity    Alcohol use: Yes     Comment: social    Drug use: No    Sexual activity: Yes   Other Topics Concern    Not on file   Social History Narrative    Not on file     Social Drivers of Health     Financial Resource Strain: Not on file   Food Insecurity: Not on file   Transportation Needs: Not on file   Physical Activity: Not on file   Stress: Not on file   Social Connections: Not on file   Intimate Partner Violence: Not on file   Housing Stability: Not on file       Family Psychiatric History:     Family History[4]    History Review: The following portions of the patient's history were reviewed and updated as appropriate: allergies, current medications,  past family history, past medical history, past social history, past surgical history and problem list.           Objective      Vital signs in last 24 hours:  There were no vitals filed for this visit.    Mental Status Evaluation:    Appearance age appropriate, casually dressed   Behavior cooperative, calm   Speech normal rate, normal volume, normal pitch   Mood improved   Affect normal range and intensity, appropriate   Thought Processes organized, goal directed   Associations intact associations   Thought Content no overt delusions   Perceptual Disturbances: no auditory hallucinations, no visual hallucinations   Abnormal Thoughts  Risk Potential Suicidal ideation - None  Homicidal ideation - None  Potential for aggression - No   Orientation oriented to: person, place, time/date, and situation   Memory recent and remote memory grossly intact   Consciousness alert and awake   Attention Span Concentration Span attention span and concentration are age appropriate   Intellect appears to be of average intelligence   Insight intact   Judgement intact   Muscle Strength and  Gait unable to assess today due to virtual visit   Motor activity unable to assess today due to virtual visit   Language no difficulty naming common objects, no difficulty repeating a phrase   Fund of Knowledge adequate knowledge of current events  adequate fund of knowledge regarding past history  adequate fund of knowledge regarding vocabulary    Pain none   Pain Scale 0     Laboratory Results: I have personally reviewed all pertinent laboratory/tests results  Recent Labs (last 6 months):   Orders Only on 11/08/2024   Component Date Value    Glucose, Random 11/23/2024 78     BUN 11/23/2024 10     Creatinine 11/23/2024 0.86     eGFR 11/23/2024 86     SL AMB BUN/CREATININE RA* 11/23/2024 SEE NOTE:     Sodium 11/23/2024 138     Potassium 11/23/2024 4.3     Chloride 11/23/2024 105     CO2 11/23/2024 26     Calcium 11/23/2024 9.3     Protein, Total  11/23/2024 6.5     Albumin 11/23/2024 4.1     Globulin 11/23/2024 2.4     Albumin/Globulin Ratio 11/23/2024 1.7     TOTAL BILIRUBIN 11/23/2024 0.4     Alkaline Phosphatase 11/23/2024 42     AST 11/23/2024 16     ALT 11/23/2024 12     White Blood Cell Count 11/23/2024 7.1     Red Blood Cell Count 11/23/2024 4.53     Hemoglobin 11/23/2024 13.1     HCT 11/23/2024 40.1     MCV 11/23/2024 88.5     MCH 11/23/2024 28.9     MCHC 11/23/2024 32.7     RDW 11/23/2024 12.4     Platelet Count 11/23/2024 346     SL AMB MPV 11/23/2024 9.4     Neutrophils (Absolute) 11/23/2024 3,472     Lymphocytes (Absolute) 11/23/2024 2,876     Monocytes (Absolute) 11/23/2024 561     Eosinophils (Absolute) 11/23/2024 121     Basophils ABS 11/23/2024 71     Neutrophils 11/23/2024 48.9     Lymphocytes 11/23/2024 40.5     Monocytes 11/23/2024 7.9     Eosinophils 11/23/2024 1.7     Basophils PCT 11/23/2024 1.0     Total Cholesterol 11/23/2024 155     HDL 11/23/2024 40 (L)     Triglycerides 11/23/2024 88     LDL Calculated 11/23/2024 97     Chol HDLC Ratio 11/23/2024 3.9     Non-HDL Cholesterol 11/23/2024 115     TSH 11/23/2024 1.31                Current Outpatient Medications   Medication Sig Dispense Refill    LORazepam (ATIVAN) 0.5 mg tablet Take 1 tablet (0.5 mg total) by mouth daily as needed for anxiety 30 tablet 0    amphetamine-dextroamphetamine (ADDERALL, 20MG,) 20 mg tablet Take 1 tablet (20 mg total) by mouth 2 (two) times a day Max Daily Amount: 40 mg 60 tablet 0    cariprazine (Vraylar) 1.5 MG capsule Take 1 capsule (1.5 mg total) by mouth daily 30 capsule 3    fenofibrate (TRICOR) 145 mg tablet Take 1 tablet (145 mg total) by mouth daily 100 tablet 1    fluticasone (FLONASE) 50 mcg/act nasal spray 1 spray into each nostril in the morning.      Junel 1.5/30 1.5-30 MG-MCG TABS Take 1 tablet by mouth in the morning      multivitamin (THERAGRAN) TABS Take 1 tablet by mouth in the morning.      Semaglutide-Weight Management (Wegovy) 2.4  MG/0.75ML Inject 0.75 mL (2.4 mg total) under the skin once a week 4 mL 0    Vortioxetine HBr (Trintellix) 20 MG tablet Take 1 tablet (20 mg total) by mouth daily 30 tablet 3     No current facility-administered medications for this visit.         Medications Risks/Benefits      Risks, Benefits And Possible Side Effects Of Medications:    Risks, benefits, and possible side effects of medications explained to Megan and she verbalizes understanding and agreement for treatment.    Controlled Medication Discussion:     Megan has been filling controlled prescriptions on time as prescribed according to [place] prescription Drug Monitoring Program    Psychotherapy Provided:     Individual psychotherapy provided: Yes  Counseling was provided during the session today for 20 minutes.  Goals discussed during in session: maintain mood stability.   Safety plan not compiledwe will plan to complete next session  Psychoeducation provided to the patient and was educated about the importance of compliance with the medications and psychiatric treatment  Supportive psychotherapy provided to the patient  Solution Focused Brief Therapy (SFBT) provided  Patient's emotions were validated and specific labeled praise provided.   [place] suggestions were offered in a supportive non-critical way.     Treatment Plan:    Completed and signed during the session: Not applicable - Treatment Plan not due at this session    Note Share    This note was not shared with the patient due to this is a psychotherapy note    Sivan Foy, PhD 06/01/25    This note was completed in part utilizing Dragon dictation Software. Grammatical, translation, syntax errors, random word insertions, spelling mistakes, and incomplete sentences may be an occasional consequence of this system secondary to software limitations with voice recognition, ambient noise, and hardware issues. If you have any questions or concerns about the content, text, or information  contained within the body of this dictation, please contact the provider for clarification.        [1]   Past Medical History:  Diagnosis Date    Anxiety 2020    Attention deficit hyperactivity disorder (ADHD), predominantly hyperactive type 3/10/2022    Lumbar degenerative disc disease 3/10/2022    Mood disorder (HCC) 2025    Patient denies medical problems     Seasonal allergic rhinitis 2022    Stress headaches 2020   [2]   Past Surgical History:  Procedure Laterality Date     SECTION     [3] No Known Allergies  [4]   Family History  Problem Relation Name Age of Onset    Breast cancer Mother      GI problems Father      Depression Father      Substance Abuse Neg Hx

## 2025-06-02 DIAGNOSIS — F39 MOOD DISORDER (HCC): ICD-10-CM

## 2025-06-02 RX ORDER — CARIPRAZINE 1.5 MG/1
1.5 CAPSULE, GELATIN COATED ORAL DAILY
Qty: 30 CAPSULE | Refills: 0 | Status: SHIPPED | OUTPATIENT
Start: 2025-06-02

## 2025-06-13 DIAGNOSIS — E66.3 OVERWEIGHT (BMI 25.0-29.9): Primary | ICD-10-CM

## 2025-06-13 RX ORDER — SEMAGLUTIDE 1.7 MG/.75ML
INJECTION, SOLUTION SUBCUTANEOUS
Qty: 3 ML | Refills: 0 | Status: SHIPPED | OUTPATIENT
Start: 2025-06-13

## 2025-06-16 ENCOUNTER — PATIENT MESSAGE (OUTPATIENT)
Dept: FAMILY MEDICINE CLINIC | Facility: CLINIC | Age: 45
End: 2025-06-16

## 2025-06-16 DIAGNOSIS — F90.1 ATTENTION DEFICIT HYPERACTIVITY DISORDER (ADHD), PREDOMINANTLY HYPERACTIVE TYPE: ICD-10-CM

## 2025-06-16 NOTE — TELEPHONE ENCOUNTER
Message sent via [email or web address].     Hi!      We leave for vacation this Saturday and will be gone for two weeks - i am almost out of my adderall but didn’t want to put it in yet in case it didn’t go through because i was afraid its too early. So if i put it through to refill at the end of the week to the pharmacy that i will be near on vacation, can you call it into that pharmacy instead? Let me know :) thanks!

## 2025-06-16 NOTE — TELEPHONE ENCOUNTER
Requested medication(s) are due for refill today: No DUE 6/19/25  Patient has already received a courtesy refill: No  Other reason request has been forwarded to provider: This refill cannot be delegated

## 2025-06-17 NOTE — PATIENT COMMUNICATION
Spoke to Megan and she is leaving on Saturday for vacation.    She wanted to know if she could get a VACATION fill.

## 2025-06-17 NOTE — TELEPHONE ENCOUNTER
Requested medication(s) are due for refill today: No - DUE 6/19/25  Patient has already received a courtesy refill: No  Other reason request has been forwarded to provider: This refill cannot be delegated

## 2025-06-19 RX ORDER — DEXTROAMPHETAMINE SACCHARATE, AMPHETAMINE ASPARTATE, DEXTROAMPHETAMINE SULFATE AND AMPHETAMINE SULFATE 5; 5; 5; 5 MG/1; MG/1; MG/1; MG/1
20 TABLET ORAL
Qty: 60 TABLET | Refills: 0 | Status: SHIPPED | OUTPATIENT
Start: 2025-06-19

## 2025-06-22 DIAGNOSIS — F33.1 MODERATE EPISODE OF RECURRENT MAJOR DEPRESSIVE DISORDER (HCC): ICD-10-CM

## 2025-06-23 RX ORDER — VORTIOXETINE 20 MG/1
20 TABLET, FILM COATED ORAL DAILY
Qty: 30 TABLET | Refills: 0 | OUTPATIENT
Start: 2025-06-23

## 2025-06-25 ENCOUNTER — TELEPHONE (OUTPATIENT)
Age: 45
End: 2025-06-25

## 2025-06-25 RX ORDER — VORTIOXETINE 10 MG/1
10 TABLET, FILM COATED ORAL DAILY
Qty: 30 TABLET | Refills: 0 | OUTPATIENT
Start: 2025-06-25

## 2025-06-25 NOTE — TELEPHONE ENCOUNTER
Dr. Plummer sent the refill after further review.     Spoke with Megan and reviewed initial denial, but the was sent with further review. She was appreciative.

## 2025-06-25 NOTE — TELEPHONE ENCOUNTER
Please provide rationale for refusal. Last prescription was 2/05 with 3 refills. All have been used with last refill on 5/27/25. Megan called today regarding denied refill.   Thank you

## 2025-06-29 DIAGNOSIS — F39 MOOD DISORDER (HCC): ICD-10-CM

## 2025-06-30 RX ORDER — CARIPRAZINE 1.5 MG/1
1.5 CAPSULE, GELATIN COATED ORAL DAILY
Qty: 30 CAPSULE | Refills: 0 | Status: SHIPPED | OUTPATIENT
Start: 2025-06-30

## 2025-07-02 ENCOUNTER — TELEPHONE (OUTPATIENT)
Dept: PSYCHIATRY | Facility: CLINIC | Age: 45
End: 2025-07-02

## 2025-07-02 NOTE — TELEPHONE ENCOUNTER
Called and left message for patient to schedule follow up for medication management. Please schedule upon return call.

## 2025-07-18 DIAGNOSIS — F90.1 ATTENTION DEFICIT HYPERACTIVITY DISORDER (ADHD), PREDOMINANTLY HYPERACTIVE TYPE: ICD-10-CM

## 2025-07-22 RX ORDER — DEXTROAMPHETAMINE SACCHARATE, AMPHETAMINE ASPARTATE, DEXTROAMPHETAMINE SULFATE AND AMPHETAMINE SULFATE 5; 5; 5; 5 MG/1; MG/1; MG/1; MG/1
20 TABLET ORAL
Qty: 60 TABLET | Refills: 0 | Status: SHIPPED | OUTPATIENT
Start: 2025-07-22

## 2025-07-30 DIAGNOSIS — F33.1 MODERATE EPISODE OF RECURRENT MAJOR DEPRESSIVE DISORDER (HCC): ICD-10-CM

## 2025-07-30 DIAGNOSIS — F39 MOOD DISORDER (HCC): ICD-10-CM

## 2025-07-31 RX ORDER — CARIPRAZINE 1.5 MG/1
1.5 CAPSULE, GELATIN COATED ORAL DAILY
Qty: 30 CAPSULE | Refills: 0 | Status: SHIPPED | OUTPATIENT
Start: 2025-07-31

## 2025-08-17 DIAGNOSIS — F90.1 ATTENTION DEFICIT HYPERACTIVITY DISORDER (ADHD), PREDOMINANTLY HYPERACTIVE TYPE: ICD-10-CM

## 2025-08-19 RX ORDER — DEXTROAMPHETAMINE SACCHARATE, AMPHETAMINE ASPARTATE, DEXTROAMPHETAMINE SULFATE AND AMPHETAMINE SULFATE 5; 5; 5; 5 MG/1; MG/1; MG/1; MG/1
20 TABLET ORAL
Qty: 60 TABLET | Refills: 0 | Status: SHIPPED | OUTPATIENT
Start: 2025-08-19

## 2025-08-21 ENCOUNTER — TELEPHONE (OUTPATIENT)
Age: 45
End: 2025-08-21